# Patient Record
Sex: MALE | Race: ASIAN | NOT HISPANIC OR LATINO | Employment: UNEMPLOYED | URBAN - METROPOLITAN AREA
[De-identification: names, ages, dates, MRNs, and addresses within clinical notes are randomized per-mention and may not be internally consistent; named-entity substitution may affect disease eponyms.]

---

## 2017-01-31 DIAGNOSIS — L50.0 ALLERGIC URTICARIA: Primary | ICD-10-CM

## 2017-01-31 NOTE — TELEPHONE ENCOUNTER
wal-fex allergy      Last Written Prescription Date: 5/13/16  Last Fill Quantity: 90,  # refills: 2   Last Office Visit with G, P or [facility] prescribing provider: 9/1/16

## 2017-02-01 RX ORDER — FEXOFENADINE HCL 180 MG/1
180 TABLET ORAL DAILY
Qty: 90 TABLET | Refills: 2 | Status: SHIPPED | OUTPATIENT
Start: 2017-02-01 | End: 2017-08-15 | Stop reason: ALTCHOICE

## 2017-02-02 NOTE — TELEPHONE ENCOUNTER
Prescription approved per [facility].      Signed Prescriptions:                        Disp   Refills    fexofenadine (ALLEGRA) 180 MG tablet       90 tab*2        Sig: Take 1 tablet (180 mg) by mouth daily  Authorizing Provider: SOILA GLASGOW  Ordering User: ZITA BOLAÑOS      Closing encounter - no further actions needed at this time    Zita Bolaños RN

## 2017-04-19 ENCOUNTER — OFFICE VISIT (OUTPATIENT)
Dept: SLEEP MEDICINE | Facility: CLINIC | Age: 52
End: 2017-04-19
Attending: INTERNAL MEDICINE
Payer: COMMERCIAL

## 2017-04-19 VITALS
DIASTOLIC BLOOD PRESSURE: 83 MMHG | WEIGHT: 217.1 LBS | OXYGEN SATURATION: 96 % | HEIGHT: 72 IN | RESPIRATION RATE: 18 BRPM | SYSTOLIC BLOOD PRESSURE: 135 MMHG | BODY MASS INDEX: 29.4 KG/M2

## 2017-04-19 DIAGNOSIS — G47.33 OSA (OBSTRUCTIVE SLEEP APNEA): Primary | ICD-10-CM

## 2017-04-19 PROCEDURE — 99211 OFF/OP EST MAY X REQ PHY/QHP: CPT | Mod: ZF

## 2017-04-19 NOTE — NURSING NOTE
"Chief Complaint   Patient presents with     RECHECK     follow up dental device       Initial /83 (BP Location: Right arm, Patient Position: Chair, Cuff Size: Adult Large)  Resp 18  Ht 1.816 m (5' 11.5\")  Wt 98.5 kg (217 lb 1.6 oz)  SpO2 96%  BMI 29.86 kg/m2 Estimated body mass index is 29.86 kg/(m^2) as calculated from the following:    Height as of this encounter: 1.816 m (5' 11.5\").    Weight as of this encounter: 98.5 kg (217 lb 1.6 oz).  Medication Reconciliation: complete       Amado GRANGER      "

## 2017-04-19 NOTE — PROGRESS NOTES
SLEEP MEDICINE FOLLOWUP VISIT NOTE       DATE OF SLEEP CLINIC VISIT:  04/19/2017       CHIEF COMPLAINT AND PURPOSE OF VISIT:  Followup of ENEDINA.      HISTORY OF PRESENT ILLNESS:  Mr. Margaret Izaguirre is a 52-year-old male who presents to the Sleep Clinic today for  followup of ENEDINA.  The AHI from his initial sleep study was 11 per hour.  His sleep apnea is being treated with oral appliance and he has been  following up with Dr. Marlene Chavez (dentistry).  He reports using the oral appliance regularly during sleep.  An overnight polysomnography evaluation was obtained on 03/14/2016.  At the time of that PSG he weighed 210 pounds. During the polysomnography evaluation, the oral appliance was adjusted twice.  The residual AHI after the adjustments were completed was 8.2 per hour.  However, it was noted that obstructive events were noted while the patient was supine with a supine AHI of 10.6 per hour.  However, there was no significant sleep apneas while the patient was in nonsupine sleep position with an AHI of 4.3 per hour. Since the polysomnography evaluation in 03/2016, he has had the oral appliance at further adjusted once by Dr. Marlene Chavez.     According to his wife, there have not been any concerns about snoring since he has been using the oral appliance.  Mr. Izaguirre denies waking up gasping for air or with choking sensation.  He does report feeling more refreshed since he started the treatment with the oral appliance and notices improved energy levels during the day and denies excessive daytime sleepiness.  He denies any concerns about drowsiness while driving.        The patient mentioned  that  Dr. Chavez had referred him to Dr. Salas at the [facility] for evaluation of  nasal septal deviation and the ENT provider has discussed with him about septoplasty.  He had questions about the procedure and mentioned  that he would try all the noninvasive options first before pursuing  surgical intervention.   He denies  "problems with chronic nasal congestion.      There have been 7 pounds of weight gain since the PSG evaluation in 03/2016. He exercises regularly.       Current meds, Past medical history, Past surgical history, Allergies, Social history, Family history: reviewed, per EMR    EXAM:  /83 (BP Location: Right arm, Patient Position: Chair, Cuff Size: Adult Large)  Resp 18  Ht 1.816 m (5' 11.5\")  Wt 98.5 kg (217 lb 1.6 oz)  SpO2 96%  BMI 29.86 kg/m2  General appearance:  in no apparent distress  Pt is dressed casually, cooperative with good eye contact.   Speech is spontaneous with regular rate and volume.   Mood: euthymic; affect congruent with full range and intensity.   Sensorium: awake, alert and oriented to person, place, time, and situation.      IMPRESSION/ REPORT/ PLAN:  Previously diagnosed mild obstructive sleep apnea (AHI 11) per hour.  The patient is compliant with oral appliance for the treatment of sleep apnea and based on the polysomnography evaluation that was obtained in 03/2016 with the oral appliance there was no evidence of clinically significant sleep apnea during nonsupine sleep position.  I instructed him to follow up with his dentist, Dr. Marlene Chavez, to see if there is any feasibility for further advancement of the oral appliance.  In addition to the oral appliance, I have also recommended positional restriction during sleep and we discussed about tennis ball shirt, which is  cost effective, and the FDA approved Zzoma pillow.  The patient was interested in the Zzoma pillow, a prescription was provided for Zzoma pillow.  He was informed that the zzoma pillow may not be covered by  Insurance.  He was fine with that.  I also recommended obtaining a home sleep study after he obtains the Zzoma pillow and the HST will be done while he is using the oral appliance and the positional restriction device for checking the effectiveness of the combination treatment in sleep apnea.  The results of the " HST will be discussed with him via virtual visit in 1-2 weeks after the test is completed.  We discussed weight management with diet and exercise.   The patient has been reporting positive benefits from the current treatment with oral appliance and knowing that the sleep study obtained 1 year ago showed an AHI of only 4.3 per hour in nonsupine position, surgical intervention would probably not be necessary if the patient is not interested and also is not symptomatic.      TOTAL TIME SPENT DURING THIS VISIT:  25 minutes, more than 50% spent in counseling and coordinating plan of care.         MARIO RODARTE MD             D: 2017 15:28   T: 2017 16:13   MT: CYNTHIA      Name:     JESU CANDELARIO   MRN:      [id number]        Account:      [id number]   :      1965           Visit Date:   2017      Document: [id number]

## 2017-04-19 NOTE — PROGRESS NOTES
DICTATED THE SLEEP CLINIC VISIT NOTE FOR TODAY.  Darrell Shelby MD   of Medicine,  Division of Pulmonary/Sleep Medicine  [facility].

## 2017-04-19 NOTE — PATIENT INSTRUCTIONS

## 2017-04-30 DIAGNOSIS — G93.32 CHRONIC FATIGUE SYNDROME: ICD-10-CM

## 2017-04-30 DIAGNOSIS — G89.4 CHRONIC PAIN SYNDROME: ICD-10-CM

## 2017-04-30 DIAGNOSIS — M79.7 FIBROMYALGIA: ICD-10-CM

## 2017-04-30 DIAGNOSIS — F33.42 RECURRENT MAJOR DEPRESSIVE DISORDER, IN FULL REMISSION (H): ICD-10-CM

## 2017-05-01 ENCOUNTER — MYC MEDICAL ADVICE (OUTPATIENT)
Dept: FAMILY MEDICINE | Facility: CLINIC | Age: 52
End: 2017-05-01

## 2017-05-01 RX ORDER — DULOXETIN HYDROCHLORIDE 60 MG/1
CAPSULE, DELAYED RELEASE ORAL
Qty: 90 CAPSULE | Refills: 0 | Status: SHIPPED | OUTPATIENT
Start: 2017-05-01 | End: 2017-05-03

## 2017-05-01 ASSESSMENT — PATIENT HEALTH QUESTIONNAIRE - PHQ9
SUM OF ALL RESPONSES TO PHQ QUESTIONS 1-9: 4
10. IF YOU CHECKED OFF ANY PROBLEMS, HOW DIFFICULT HAVE THESE PROBLEMS MADE IT FOR YOU TO DO YOUR WORK, TAKE CARE OF THINGS AT HOME, OR GET ALONG WITH OTHER PEOPLE: SOMEWHAT DIFFICULT

## 2017-05-01 NOTE — TELEPHONE ENCOUNTER
Routing refill request to provider for review/approval because:  -PHQ-9 > 4    Dr. Orlando-Please sign if agree.  Writer sent patient a [employer] message with PHQ-9 questionnaire attached.    Thank you!  CECIL KhanN, RN

## 2017-05-01 NOTE — TELEPHONE ENCOUNTER
Medication Detail      Disp Refills Start End LINDA   DULoxetine (CYMBALTA) 60 MG capsule 90 capsule 1 9/1/2016  No   Sig: Take 1 capsule (60 mg) by mouth daily       Last Office Visit with G, P or [facility] prescribing provider: 9/1/16   Next 5 appointments (look out 90 days)     May 03, 2017 12:00 PM CDT   Pre-Op physical with Danielle Orlando MD   [facility]    [address] [place] [id number] [phone]                   BP Readings from Last 3 Encounters:   04/19/17 135/83   09/01/16 136/88   03/16/16 116/70     Pulse: (for Fetzima)  Creatinine   Date Value Ref Range Status   09/01/2016 0.83 0.66 - 1.25 mg/dL Final   ]    Last PHQ-9 score on record=   PHQ-9 SCORE 9/1/2016   Total Score MyChart -   Total Score 7

## 2017-05-02 ASSESSMENT — PATIENT HEALTH QUESTIONNAIRE - PHQ9: SUM OF ALL RESPONSES TO PHQ QUESTIONS 1-9: 4

## 2017-05-02 NOTE — TELEPHONE ENCOUNTER
PHQ-9 SCORE 1/7/2016 9/1/2016 5/1/2017   Total Score - - -   Total Score MyChart - - 4 (Minimal depression)   Total Score 6 7 -     Dr. Orlando-Please review.    Thank you!  CARLITA Santos, BSN, RN

## 2017-05-03 ENCOUNTER — OFFICE VISIT (OUTPATIENT)
Dept: FAMILY MEDICINE | Facility: CLINIC | Age: 52
End: 2017-05-03
Payer: COMMERCIAL

## 2017-05-03 VITALS
OXYGEN SATURATION: 100 % | HEIGHT: 70 IN | TEMPERATURE: 98.4 F | HEART RATE: 102 BPM | SYSTOLIC BLOOD PRESSURE: 125 MMHG | BODY MASS INDEX: 31.25 KG/M2 | WEIGHT: 218.25 LBS | DIASTOLIC BLOOD PRESSURE: 83 MMHG

## 2017-05-03 DIAGNOSIS — G47.33 OBSTRUCTIVE SLEEP APNEA: ICD-10-CM

## 2017-05-03 DIAGNOSIS — G89.4 CHRONIC PAIN SYNDROME: ICD-10-CM

## 2017-05-03 DIAGNOSIS — Z01.818 PREOP GENERAL PHYSICAL EXAM: Primary | ICD-10-CM

## 2017-05-03 DIAGNOSIS — M79.7 FIBROMYALGIA: ICD-10-CM

## 2017-05-03 DIAGNOSIS — F33.42 RECURRENT MAJOR DEPRESSIVE DISORDER, IN FULL REMISSION (H): ICD-10-CM

## 2017-05-03 DIAGNOSIS — G93.32 CHRONIC FATIGUE SYNDROME: ICD-10-CM

## 2017-05-03 LAB — HGB BLD-MCNC: 14.5 G/DL (ref 13.3–17.7)

## 2017-05-03 PROCEDURE — 85018 HEMOGLOBIN: CPT | Performed by: FAMILY MEDICINE

## 2017-05-03 PROCEDURE — 36415 COLL VENOUS BLD VENIPUNCTURE: CPT | Performed by: FAMILY MEDICINE

## 2017-05-03 PROCEDURE — 99215 OFFICE O/P EST HI 40 MIN: CPT | Performed by: FAMILY MEDICINE

## 2017-05-03 PROCEDURE — 80053 COMPREHEN METABOLIC PANEL: CPT | Performed by: FAMILY MEDICINE

## 2017-05-03 RX ORDER — DULOXETIN HYDROCHLORIDE 60 MG/1
CAPSULE, DELAYED RELEASE ORAL
Qty: 90 CAPSULE | Refills: 1 | Status: SHIPPED | OUTPATIENT
Start: 2017-05-03 | End: 2018-01-11

## 2017-05-03 RX ORDER — PREGABALIN 150 MG/1
150 CAPSULE ORAL 2 TIMES DAILY
Qty: 180 CAPSULE | Refills: 3 | Status: SHIPPED | OUTPATIENT
Start: 2017-05-03 | End: 2018-04-25

## 2017-05-03 ASSESSMENT — ANXIETY QUESTIONNAIRES
2. NOT BEING ABLE TO STOP OR CONTROL WORRYING: NOT AT ALL
GAD7 TOTAL SCORE: 0
6. BECOMING EASILY ANNOYED OR IRRITABLE: NOT AT ALL
3. WORRYING TOO MUCH ABOUT DIFFERENT THINGS: NOT AT ALL
5. BEING SO RESTLESS THAT IT IS HARD TO SIT STILL: NOT AT ALL
1. FEELING NERVOUS, ANXIOUS, OR ON EDGE: NOT AT ALL
7. FEELING AFRAID AS IF SOMETHING AWFUL MIGHT HAPPEN: NOT AT ALL

## 2017-05-03 ASSESSMENT — PATIENT HEALTH QUESTIONNAIRE - PHQ9: 5. POOR APPETITE OR OVEREATING: NOT AT ALL

## 2017-05-03 NOTE — PROGRESS NOTES
[facility]  [address] [place] [id number] [phone]  Dept: [phone]    PRE-OP EVALUATION:  Today's date: 5/3/2017    Margaret Izaguirre (: 1965) presents for pre-operative evaluation assessment as requested by Dr. Swetha Salas.  He requires evaluation and anesthesia risk assessment prior to undergoing surgery/procedure for treatment of deviated septum.  Proposed procedure: septoplasty    Date of Surgery/ Procedure: 17  Time of Surgery/ Procedure: 7am  Hospital/Surgical Facility: [facility]   Fax number for surgical facility: [phone] ([facility])  Primary Physician: Danielle Orlando  Type of Anesthesia Anticipated: General    Patient has a Health Care Directive or Living Will:  NO    Preop Questions 2017   1.  Do you have a history of heart attack, stroke, stent, bypass or surgery on an artery in the head, neck, heart or legs? No   2.  Do you ever have any pain or discomfort in your chest? No   3.  Do you have a history of  Heart Failure? No   4.   Are you troubled by shortness of breath when:  walking on a level surface, or up a slight hill, or at night? No   5.  Do you currently have a cold, bronchitis or other respiratory infection? No   6.  Do you have a cough, shortness of breath, or wheezing? No   7.  Do you sometimes get pains in the calves of your legs when you walk? No   8. Do you or anyone in your family have previous history of blood clots? No   9.  Do you or does anyone in your family have a serious bleeding problem such as prolonged bleeding following surgeries or cuts? No   10. Have you ever had problems with anemia or been told to take iron pills? No   11. Have you had any abnormal blood loss such as black, tarry or bloody stools? No   12. Have you ever had a blood transfusion? No   13. Have you or any of your relatives ever had problems with anesthesia? No   14. Do you have sleep apnea, excessive snoring or daytime  drowsiness? YES - ENEDINA   15. Do you have any prosthetic heart valves? No   16. Do you have prosthetic joints? No         HPI:                                                      Brief HPI related to upcoming procedure: Has deviated septum - longstanding.  Has ENEDINA and uses oral appliance.  Sleep MD and oral appliance specialist both noted deviated septum may be contributing to ENEDINA.  He has decided to go ahead with the septoplasty      See problem list for active medical problems.  Problems all longstanding and stable, except as noted/documented.  See ROS for pertinent symptoms related to these conditions.                                               Fibromyalgia/CFS Follow-Up.  Still participating in [facility] in [place].  Maintaining good sleep/exercise habits.  Rarely uses trazodone any more.  Does use Lyrica and tizanidine regularly.      Depression Follow-Up.  Doing well on cymbalta.  Feels mood has been good.  PHQ-9 SCORE 9/1/2016 5/1/2017 5/3/2017   Total Score - - -   Total Score MyChart - 4 (Minimal depression) -   Total Score 7 - 5     GUERRERO-7 SCORE 9/1/2016 5/3/2017   Total Score 0 0        MEDICAL HISTORY:                                                      Patient Active Problem List    Diagnosis Date Noted     Adenomatous polyp of colon      Priority: Medium     4/2015 - rpt in 3 yrs.       Obstructive sleep apnea 03/02/2013     Priority: Medium     Chronic nasal congestion 03/02/2013     Priority: Medium     Delayed sleep phase syndrome 03/02/2013     Priority: Medium     Major depression      Priority: Medium     suicide attempt age 11 in [place]       Memory loss      Priority: Medium     mild cognitive dysfunction, executive function, Dr. Gerber, [facility]       Chronic pain syndrome 07/22/2010     Priority: Medium     Allergic urticaria 04/24/2010     Priority: Medium     Chronic fatigue syndrome      Priority: Medium     diagnosed at [place]       Fibromyalgia      Priority:  Medium      Past Medical History:   Diagnosis Date     Adenomatous polyp of colon     4/2015 - rpt in 3 yrs.     Anticholinergic crisis 12/10    OD of [facility]     Chronic fatigue syndrome 1/07    diagnosed at [place]     Fibromyalgia 2008     Generalized anxiety disorder      Insomnia      Major depression     suicide attempt age 11 in [place]     Memory loss     mild cognitive dysfunction, executive function, Dr. Gerber, [facility]     Obsessive-compulsive personality disorder      Rhabdomyolysis 12/10    [facility]     Past Surgical History:   Procedure Laterality Date     COLONOSCOPY  5/6/2005    WNL     Current Outpatient Prescriptions   Medication Sig Dispense Refill     DULoxetine (CYMBALTA) 60 MG EC capsule TAKE 1 CAPSULE BY MOUTH EVERY DAY 90 capsule 0     fexofenadine (ALLEGRA) 180 MG tablet Take 1 tablet (180 mg) by mouth daily 90 tablet 2     pregabalin (LYRICA) 150 MG capsule Take 1 capsule (150 mg) by mouth 2 times daily 60 capsule 5     tiZANidine (ZANAFLEX) 4 MG tablet Take 2 tablets (8 mg) by mouth At Bedtime       Cholecalciferol (VITAMIN D3 PO) Take 5,000 Units by mouth daily       calcium-vitamin D (CALCIUM 600 + D) 600-400 MG-UNIT per tablet Take 1 tablet by mouth daily       IBUPROFEN 200 MG PO CAPS 2 capsules 3 - 4 times/day PRN       MULTIVITAMINS OR TABS ONE DAILY 100 1 YEAR     FISH OIL 1000 MG OR CAPS 1 tab daily  0     TYLENOL CAPS 500 MG OR 1 CAPSULE EVERY 4 HOURS AS NEEDED       OTC products: None, except as noted above    Allergies   Allergen Reactions     Ambien      Hospitalized after prolonged, severe sleep-walking episode      Latex Allergy: NO    Social History   Substance Use Topics     Smoking status: Never Smoker     Smokeless tobacco: Never Used     Alcohol use Yes      Comment: Very occasional use (once every few months)     History   Drug Use No       REVIEW OF SYSTEMS:                                                      CONST: NEGATIVE for  "fevers/chills/sweats, unexplained weight loss/gain, and POSITIVE for fatigue (chronic - stable)  EYES: NEGATIVE for change in vision  ENT: NEGATIVE for difficulty hearing/tinnitus, and problems with teeth/gums  BREAST: NEGATIVE for breast lump/discharge  CV: NEGATIVE for chest pain/discomfort, leg pain with exercise, and palpitations  RESP: NEGATIVE for cough/wheeze, and difficulty breathing  GI: NEGATIVE for abdominal pain, blood in bowel movement, and nausea/vomiting/diarrhea  : NEGATIVE for nighttime urination, leaking urine, unusual vaginal bleeding, penile/vaginal discharge, and concerns about sexual function  MS: NEGATIVE for muscle/joint pain  SKIN: NEGATIVE for rash or mole change  NEURO: NEGATIVE for headache, dizziness/lightheadedness, numbness, memory loss, and loss of coordination  PSYCH: NEGATIVE for anxiety/stress and depression and POSITIVE for problems with sleep  HEME: NEGATIVE for unexplained lumps, and easy bruising/bleeding  ENDO: NEGATIVE for excessive thirst or urination  ALL: NEGATIVE for hay fever/allergies     EXAM:                                                    /83 (BP Location: Right arm, Patient Position: Chair, Cuff Size: Adult Large)  Pulse 102  Temp 98.4  F (36.9  C) (Oral)  Ht 5' 9.75\" (1.772 m)  Wt 218 lb 4 oz (99 kg)  SpO2 100%  BMI 31.54 kg/m2  GEN:  no apparent distress  EYES: PERRL, conjunctivae and sclerae clear   ENT: external ears and nose without lesions or scars, TM's and canals clear bilaterally, oropharynx clear with moist mucus membranes and normal landmarks and lips, teeth, and gums with no abnormalities noted   NECK:  Supple without adenopathy, mass, or thyromegaly   LUNGS:  normal respiratory effort, and lungs clear to auscultation bilaterally - no rales, rhonchi or wheezes  CV: regular rate and rhythm, normal S1 S2, no S3 or S4, no murmur, click or rub and bilateral lower extremities without edema   ABD:  soft, nontender, no mass, no " hepatosplenomegaly, no hernias  SKIN:  normal to inspection and palpation, no rashes or abnormal-appearing lesions   PSYCH: mood/affect appear normal/bright     DIAGNOSTICS:                                                    EKG: Not indicated due to non-vascular surgery and low risk of event (age <65 and without cardiac risk factors)    Recent Labs   Lab Test  09/01/16   1756  06/08/15   1329  03/18/15   1210  07/17/14   1600   HGB   --    --   15.0  14.3   PLT   --    --   322  327   NA  138  139  137   --    POTASSIUM  4.4  4.4  4.1   --    CR  0.83  0.76  0.74   --       Results for orders placed or performed in visit on 05/03/17   Hemoglobin   Result Value Ref Range    Hemoglobin 14.5 13.3 - 17.7 g/dL   Comprehensive metabolic panel   Result Value Ref Range    Sodium 140 133 - 144 mmol/L    Potassium 4.1 3.4 - 5.3 mmol/L    Chloride 105 94 - 109 mmol/L    Carbon Dioxide 25 20 - 32 mmol/L    Anion Gap 10 3 - 14 mmol/L    Glucose 112 (H) 70 - 99 mg/dL    Urea Nitrogen 13 7 - 30 mg/dL    Creatinine 0.81 0.66 - 1.25 mg/dL    GFR Estimate >90  Non  GFR Calc   >60 mL/min/1.7m2    GFR Estimate If Black >90   GFR Calc   >60 mL/min/1.7m2    Calcium 9.1 8.5 - 10.1 mg/dL    Bilirubin Total 0.4 0.2 - 1.3 mg/dL    Albumin 4.2 3.4 - 5.0 g/dL    Protein Total 7.9 6.8 - 8.8 g/dL    Alkaline Phosphatase 59 40 - 150 U/L    ALT 43 0 - 70 U/L    AST 23 0 - 45 U/L      IMPRESSION:                                                    Reason for surgery/procedure: deviated septum  Diagnosis/reason for consult: preoperative assessment    The proposed surgical procedure is considered INTERMEDIATE risk.    REVISED CARDIAC RISK INDEX  The patient has the following serious cardiovascular risks for perioperative complications such as (MI, PE, VFib and 3  AV Block):  No serious cardiac risks  INTERPRETATION: 0 risks: Class I (very low risk - 0.4% complication rate)    The patient has the following additional  risks for perioperative complications:  No identified additional risks      ICD-10-CM    1. Preop general physical exam Z01.818 Hemoglobin     Comprehensive metabolic panel   2. Fibromyalgia M79.7 pregabalin (LYRICA) 150 MG capsule     DULoxetine (CYMBALTA) 60 MG EC capsule   3. Obstructive sleep apnea G47.33    4. Chronic pain syndrome G89.4 DULoxetine (CYMBALTA) 60 MG EC capsule   5. Chronic fatigue syndrome R53.82 DULoxetine (CYMBALTA) 60 MG EC capsule   6. Recurrent major depressive disorder, in full remission (H) F33.42 DULoxetine (CYMBALTA) 60 MG EC capsule       RECOMMENDATIONS:                                                        --Patient is to take all scheduled medications on the day of surgery EXCEPT for modifications listed below.    Anticoagulant or Antiplatelet Medication Use  NSAIDS: Ibuprofen (Motrin):  Stop one day prior to surgery  Fish oil:  Hold for one week prior to surgery    APPROVAL GIVEN to proceed with proposed procedure, without further diagnostic evaluation     Patient Instructions   Stop the fish oil for a week before surgery.    Stop the ibuprofen 3 days prior to surgery.  If you need an over-the-counter pain med use acetaminophen.    The morning of surgery take your 3 prescription medications with small sip of water.  Hold all vitamins and supplements.        Before Your Surgery      Call your surgeon if there is any change in your health. This includes signs of a cold or flu (such as a sore throat, runny nose, cough, rash or fever).    Do not smoke, drink alcohol or take over the counter medicine (unless your surgeon or primary care doctor tells you to) for the 24 hours before and after surgery.    If you take prescribed drugs: Follow your doctor s orders about which medicines to take and which to stop until after surgery.    Eating and drinking prior to surgery: follow the instructions from your surgeon    Take a shower or bath the night before surgery. Use the soap your surgeon  gave you to gently clean your skin. If you do not have soap from your surgeon, use your regular soap. Do not shave or scrub the surgery site.  Wear clean pajamas and have clean sheets on your bed.      Signed Electronically by: Danielle Orlando MD    Copy of this evaluation report is provided to requesting physician.    [facility] Preop Guidelines

## 2017-05-03 NOTE — MR AVS SNAPSHOT
After Visit Summary   5/3/2017    Margaret Izaguirre    MRN: [id number]           Patient Information     Date Of Birth          1965        Visit Information        Provider Department      5/3/2017 12:00 PM Danielle Orlando MD Formerly named [facility]        Today's Diagnoses     Preop general physical exam    -  1    Fibromyalgia        Obstructive sleep apnea        Chronic pain syndrome        Chronic fatigue syndrome        Recurrent major depressive disorder, in full remission (H)          Care Instructions    Stop the fish oil for a week before surgery.    Stop the ibuprofen 3 days prior to surgery.  If you need an over-the-counter pain med use acetaminophen.    The morning of surgery take your 3 prescription medications with small sip of water.  Hold all vitamins and supplements.        Before Your Surgery      Call your surgeon if there is any change in your health. This includes signs of a cold or flu (such as a sore throat, runny nose, cough, rash or fever).    Do not smoke, drink alcohol or take over the counter medicine (unless your surgeon or primary care doctor tells you to) for the 24 hours before and after surgery.    If you take prescribed drugs: Follow your doctor s orders about which medicines to take and which to stop until after surgery.    Eating and drinking prior to surgery: follow the instructions from your surgeon    Take a shower or bath the night before surgery. Use the soap your surgeon gave you to gently clean your skin. If you do not have soap from your surgeon, use your regular soap. Do not shave or scrub the surgery site.  Wear clean pajamas and have clean sheets on your bed.         Follow-ups after your visit        Who to contact     If you have questions or need follow up information about today's clinic visit or your schedule please contact [facility] directly at [phone].  Normal or non-critical lab and imaging results will be communicated  "to you by [employer], letter or phone within 4 business days after the clinic has received the results. If you do not hear from us within 7 days, please contact the clinic through [employer] or phone. If you have a critical or abnormal lab result, we will notify you by phone as soon as possible.  Submit refill requests through [employer] or call your pharmacy and they will forward the refill request to us. Please allow 3 business days for your refill to be completed.          Additional Information About Your Visit        [employer] Information     [employer] gives you secure access to your electronic health record. If you see a primary care provider, you can also send messages to your care team and make appointments. If you have questions, please call your primary care clinic.  If you do not have a primary care provider, please call [phone] and they will assist you.        Care EveryWhere ID     This is your Care EveryWhere ID. This could be used by other organizations to access your [facility] medical records  [id number]        Your Vitals Were     Pulse Temperature Height Pulse Oximetry BMI (Body Mass Index)       102 98.4  F (36.9  C) (Oral) 5' 9.75\" (1.772 m) 100% 31.54 kg/m2        Blood Pressure from Last 3 Encounters:   05/03/17 125/83   04/19/17 135/83   09/01/16 136/88    Weight from Last 3 Encounters:   05/03/17 218 lb 4 oz (99 kg)   04/19/17 217 lb 1.6 oz (98.5 kg)   09/01/16 216 lb (98 kg)              We Performed the Following     Comprehensive metabolic panel     Hemoglobin          Today's Medication Changes          These changes are accurate as of: 5/3/17 12:30 PM.  If you have any questions, ask your nurse or doctor.               These medicines have changed or have updated prescriptions.        Dose/Directions    DULoxetine 60 MG EC capsule   Commonly known as:  CYMBALTA   This may have changed:  See the new instructions.   Used for:  Fibromyalgia, Chronic pain syndrome, Chronic fatigue syndrome, " Recurrent major depressive disorder, in full remission (H)   Changed by:  Danielle Orlando MD        TAKE 1 CAPSULE BY MOUTH EVERY DAY   Quantity:  90 capsule   Refills:  1         Stop taking these medicines if you haven't already. Please contact your care team if you have questions.     B-12 1000 MCG Tbcr   Stopped by:  Danielle Orlando MD           zinc sulfate 220 (50 ZN) MG capsule   Commonly known as:  ZINCATE   Stopped by:  Danielle Orlando MD                Where to get your medicines      These medications were sent to [id number] - [facility] [address] [facility]  [address] [facility] [place] [address]     Phone:  [phone]     DULoxetine 60 MG EC capsule         Some of these will need a paper prescription and others can be bought over the counter.  Ask your nurse if you have questions.     Bring a paper prescription for each of these medications     pregabalin 150 MG capsule                Primary Care Provider Office Phone # Fax #    Danielle Orlando [phone]       [facility] [address]  [facility] [address]        Thank you!     Thank you for choosing [facility]  for your care. Our goal is always to provide you with excellent care. Hearing back from our patients is one way we can continue to improve our services. Please take a few minutes to complete the written survey that you may receive in the mail after your visit with us. Thank you!             Your Updated Medication List - Protect others around you: Learn how to safely use, store and throw away your medicines at www.disposemymeds.org.          This list is accurate as of: 5/3/17 12:30 PM.  Always use your most recent med list.                   Brand Name Dispense Instructions for use    calcium 600 + D 600-400 MG-UNIT per tablet   Generic drug:  calcium-vitamin D      Take 1 tablet by mouth daily       DULoxetine  60 MG EC capsule    CYMBALTA    90 capsule    TAKE 1 CAPSULE BY MOUTH EVERY DAY       fexofenadine 180 MG tablet    ALLEGRA    90 tablet    Take 1 tablet (180 mg) by mouth daily       fish oil-omega-3 fatty acids 1000 MG capsule      1 tab daily       ibuprofen 200 MG capsule      2 capsules 3 - 4 times/day PRN       multivitamin per tablet     100    ONE DAILY       pregabalin 150 MG capsule    LYRICA    180 capsule    Take 1 capsule (150 mg) by mouth 2 times daily       tiZANidine 4 MG tablet    ZANAFLEX     Take 2 tablets (8 mg) by mouth At Bedtime       TYLENOL CAPS 500 MG OR      1 CAPSULE EVERY 4 HOURS AS NEEDED       VITAMIN D3 PO      Take 5,000 Units by mouth daily

## 2017-05-03 NOTE — LETTER
My Depression Action Plan  Name: Margaret Izaguirre   Date of Birth 1965  Date: 5/3/2017    My doctor: Danielle Orlando   My clinic: [address] [address] [phone]          GREEN    ZONE   Good Control    What it looks like:     Things are going generally well. You have normal up s and down s. You may even feel depressed from time to time, but bad moods usually last less than a day.   What you need to do:  1. Continue to care for yourself (see self care plan)  2. Check your depression survival kit and update it as needed  3. Follow your physician s recommendations including any medication.  4. Do not stop taking medication unless you consult with your physician first.           YELLOW         ZONE Getting Worse    What it looks like:     Depression is starting to interfere with your life.     It may be hard to get out of bed; you may be starting to isolate yourself from others.    Symptoms of depression are starting to last most all day and this has happened for several days.     You may have suicidal thoughts but they are not constant.   What you need to do:     1. Call your care team, your response to treatment will improve if you keep your care team informed of your progress. Yellow periods are signs an adjustment may need to be made.     2. Continue your self-care, even if you have to fake it!    3. Talk to someone in your support network    4. Open up your depression survival kit           RED    ZONE Medical Alert - Get Help    What it looks like:     Depression is seriously interfering with your life.     You may experience these or other symptoms: You can t get out of bed most days, can t work or engage in other necessary activities, you have trouble taking care of basic hygiene, or basic responsibilities, thoughts of suicide or death that will not go away, self-injurious behavior.     What you need to do:  1. Call your care team and  request a same-day appointment. If they are not available (weekends or after hours) call your local crisis line, emergency room or 911.      Electronically signed by: Danielle Orlando, May 3, 2017    Depression Self Care Plan / Survival Kit    Self-Care for Depression  Here s the deal. Your body and mind are really not as separate as most people think.  What you do and think affects how you feel and how you feel influences what you do and think. This means if you do things that people who feel good do, it will help you feel better.  Sometimes this is all it takes.  There is also a place for medication and therapy depending on how severe your depression is, so be sure to consult with your medical provider and/ or Behavioral Health Consultant if your symptoms are worsening or not improving.     In order to better manage my stress, I will:    Exercise  Get some form of exercise, every day. This will help reduce pain and release endorphins, the  feel good  chemicals in your brain. This is almost as good as taking antidepressants!  This is not the same as joining a gym and then never going! (they count on that by the way ) It can be as simple as just going for a walk or doing some gardening, anything that will get you moving.      Hygiene   Maintain good hygiene (Get out of bed in the morning, Make your bed, Brush your teeth, Take a shower, and Get dressed like you were going to work, even if you are unemployed).  If your clothes don't fit try to get ones that do.    Diet  I will strive to eat foods that are good for me, drink plenty of water, and avoid excessive sugar, caffeine, alcohol, and other mood-altering substances.  Some foods that are helpful in depression are: complex carbohydrates, B vitamins, flaxseed, fish or fish oil, fresh fruits and vegetables.    Psychotherapy  I agree to participate in Individual Therapy (if recommended).    Medication  If prescribed medications, I agree to take them.  Missing doses  can result in serious side effects.  I understand that drinking alcohol, or other illicit drug use, may cause potential side effects.  I will not stop my medication abruptly without first discussing it with my provider.    Staying Connected With Others  I will stay in touch with my friends, family members, and my primary care provider/team.    Use your imagination  Be creative.  We all have a creative side; it doesn t matter if it s oil painting, sand castles, or mud pies! This will also kick up the endorphins.    Witness Beauty  (AKA stop and smell the roses) Take a look outside, even in mid-winter. Notice colors, textures. Watch the squirrels and birds.     Service to others  Be of service to others.  There is always someone else in need.  By helping others we can  get out of ourselves  and remember the really important things.  This also provides opportunities for practicing all the other parts of the program.    Humor  Laugh and be silly!  Adjust your TV habits for less news and crime-drama and more comedy.    Control your stress  Try breathing deep, massage therapy, biofeedback, and meditation. Find time to relax each day.     My support system    Clinic Contact:  Phone number:    Contact 1:  Phone number:    Contact 2:  Phone number:    [place]/:  Phone number:    Therapist:  Phone number:    Local crisis center:    Phone number:    Other community support:  Phone number:

## 2017-05-03 NOTE — NURSING NOTE
"Chief Complaint   Patient presents with     Pre-Op Exam       Initial /83 (BP Location: Right arm, Patient Position: Chair, Cuff Size: Adult Large)  Pulse 102  Temp 98.4  F (36.9  C) (Oral)  Ht 5' 9.75\" (1.772 m)  Wt 218 lb 4 oz (99 kg)  SpO2 100%  BMI 31.54 kg/m2 Estimated body mass index is 31.54 kg/(m^2) as calculated from the following:    Height as of this encounter: 5' 9.75\" (1.772 m).    Weight as of this encounter: 218 lb 4 oz (99 kg).  Medication Reconciliation: complete     Cinthya Mireles MA        "

## 2017-05-03 NOTE — PATIENT INSTRUCTIONS
Stop the fish oil for a week before surgery.    Stop the ibuprofen 3 days prior to surgery.  If you need an over-the-counter pain med use acetaminophen.    The morning of surgery take your 3 prescription medications with small sip of water.  Hold all vitamins and supplements.        Before Your Surgery      Call your surgeon if there is any change in your health. This includes signs of a cold or flu (such as a sore throat, runny nose, cough, rash or fever).    Do not smoke, drink alcohol or take over the counter medicine (unless your surgeon or primary care doctor tells you to) for the 24 hours before and after surgery.    If you take prescribed drugs: Follow your doctor s orders about which medicines to take and which to stop until after surgery.    Eating and drinking prior to surgery: follow the instructions from your surgeon    Take a shower or bath the night before surgery. Use the soap your surgeon gave you to gently clean your skin. If you do not have soap from your surgeon, use your regular soap. Do not shave or scrub the surgery site.  Wear clean pajamas and have clean sheets on your bed.

## 2017-05-04 LAB
ALBUMIN SERPL-MCNC: 4.2 G/DL (ref 3.4–5)
ALP SERPL-CCNC: 59 U/L (ref 40–150)
ALT SERPL W P-5'-P-CCNC: 43 U/L (ref 0–70)
ANION GAP SERPL CALCULATED.3IONS-SCNC: 10 MMOL/L (ref 3–14)
AST SERPL W P-5'-P-CCNC: 23 U/L (ref 0–45)
BILIRUB SERPL-MCNC: 0.4 MG/DL (ref 0.2–1.3)
BUN SERPL-MCNC: 13 MG/DL (ref 7–30)
CALCIUM SERPL-MCNC: 9.1 MG/DL (ref 8.5–10.1)
CHLORIDE SERPL-SCNC: 105 MMOL/L (ref 94–109)
CO2 SERPL-SCNC: 25 MMOL/L (ref 20–32)
CREAT SERPL-MCNC: 0.81 MG/DL (ref 0.66–1.25)
GFR SERPL CREATININE-BSD FRML MDRD: ABNORMAL ML/MIN/1.7M2
GLUCOSE SERPL-MCNC: 112 MG/DL (ref 70–99)
POTASSIUM SERPL-SCNC: 4.1 MMOL/L (ref 3.4–5.3)
PROT SERPL-MCNC: 7.9 G/DL (ref 6.8–8.8)
SODIUM SERPL-SCNC: 140 MMOL/L (ref 133–144)

## 2017-05-04 ASSESSMENT — PATIENT HEALTH QUESTIONNAIRE - PHQ9: SUM OF ALL RESPONSES TO PHQ QUESTIONS 1-9: 5

## 2017-05-04 ASSESSMENT — ANXIETY QUESTIONNAIRES: GAD7 TOTAL SCORE: 0

## 2017-05-05 NOTE — PROGRESS NOTES
Tj Robles,  Thanks for coming to clinic.  Your test results are below.  Everything looks fine.    Danielle Orlando MD

## 2017-05-17 ENCOUNTER — MYC MEDICAL ADVICE (OUTPATIENT)
Dept: FAMILY MEDICINE | Facility: CLINIC | Age: 52
End: 2017-05-17

## 2017-05-17 DIAGNOSIS — G93.32 CHRONIC FATIGUE SYNDROME: Primary | ICD-10-CM

## 2017-06-05 ENCOUNTER — MYC MEDICAL ADVICE (OUTPATIENT)
Dept: FAMILY MEDICINE | Facility: CLINIC | Age: 52
End: 2017-06-05

## 2017-06-05 NOTE — TELEPHONE ENCOUNTER
Dr. Orlando -- FYI from patient regarding his septoplasty.    Katharina Woods, BSN, RN  [facility]

## 2017-06-21 ENCOUNTER — OFFICE VISIT (OUTPATIENT)
Dept: FAMILY MEDICINE | Facility: CLINIC | Age: 52
End: 2017-06-21
Payer: COMMERCIAL

## 2017-06-21 VITALS
HEART RATE: 91 BPM | BODY MASS INDEX: 30.99 KG/M2 | SYSTOLIC BLOOD PRESSURE: 109 MMHG | HEIGHT: 70 IN | WEIGHT: 216.5 LBS | RESPIRATION RATE: 20 BRPM | DIASTOLIC BLOOD PRESSURE: 74 MMHG | OXYGEN SATURATION: 97 % | TEMPERATURE: 98.5 F

## 2017-06-21 DIAGNOSIS — M79.7 FIBROMYALGIA: ICD-10-CM

## 2017-06-21 DIAGNOSIS — G89.4 CHRONIC PAIN SYNDROME: ICD-10-CM

## 2017-06-21 DIAGNOSIS — G47.33 OBSTRUCTIVE SLEEP APNEA: ICD-10-CM

## 2017-06-21 DIAGNOSIS — G93.32 CHRONIC FATIGUE SYNDROME: ICD-10-CM

## 2017-06-21 DIAGNOSIS — J34.2 DEVIATED NASAL SEPTUM: ICD-10-CM

## 2017-06-21 DIAGNOSIS — Z01.818 PREOP GENERAL PHYSICAL EXAM: Primary | ICD-10-CM

## 2017-06-21 DIAGNOSIS — R09.81 CHRONIC NASAL CONGESTION: ICD-10-CM

## 2017-06-21 LAB — HGB BLD-MCNC: 15.1 G/DL (ref 13.3–17.7)

## 2017-06-21 PROCEDURE — 99214 OFFICE O/P EST MOD 30 MIN: CPT | Performed by: FAMILY MEDICINE

## 2017-06-21 PROCEDURE — 85018 HEMOGLOBIN: CPT | Performed by: FAMILY MEDICINE

## 2017-06-21 PROCEDURE — 36415 COLL VENOUS BLD VENIPUNCTURE: CPT | Performed by: FAMILY MEDICINE

## 2017-06-21 NOTE — PROGRESS NOTES
[facility]  [address] [place] [id number] [phone]  Dept: [phone]    PRE-OP EVALUATION:  Today's date: 2017    Margaret Izaguirre (: 1965) presents for pre-operative evaluation assessment as requested by Dr. Nick Zavala.  He requires evaluation and anesthesia risk assessment prior to undergoing surgery/procedure for treatment of deviated septum.  Proposed procedure:Septoturbinoplasty.    Date of Surgery/ Procedure: 17  Time of Surgery/ Procedure: 7:30am  Hospital/Surgical Facility: [facility]  Fax number for surgical facility: [phone]  Primary Physician: Danielle Orlando  Type of Anesthesia Anticipated: General    Patient has a Health Care Directive or Living Will:  NO    Preop Questions 2017   1.  Do you have a history of heart attack, stroke, stent, bypass or surgery on an artery in the head, neck, heart or legs? No   2.  Do you ever have any pain or discomfort in your chest? No   3.  Do you have a history of  Heart Failure? No   4.   Are you troubled by shortness of breath when:  walking on a level surface, or up a slight hill, or at night? No   5.  Do you currently have a cold, bronchitis or other respiratory infection? No   6.  Do you have a cough, shortness of breath, or wheezing? No   7.  Do you sometimes get pains in the calves of your legs when you walk? No   8. Do you or anyone in your family have previous history of blood clots? No   9.  Do you or does anyone in your family have a serious bleeding problem such as prolonged bleeding following surgeries or cuts? No   10. Have you ever had problems with anemia or been told to take iron pills? No   11. Have you had any abnormal blood loss such as black, tarry or bloody stools? No   12. Have you ever had a blood transfusion? No   13. Have you or any of your relatives ever had problems with anesthesia? No   14. Do you have sleep apnea, excessive snoring or daytime drowsiness? YES  - uses oral appliance   15. Do you have any prosthetic heart valves? No   16. Do you have prosthetic joints? No         HPI:                                                      Brief HPI related to upcoming procedure: Has deviated septum - longstanding.  Has ENEDINA and uses oral appliance.  Sleep MD and oral appliance specialist both noted deviated septum may be contributing to ENEDINA.  He has decided to go ahead with the septoplasty      See problem list for active medical problems.  Problems all longstanding and stable, except as noted/documented.  See ROS for pertinent symptoms related to these conditions.                                               Fibromyalgia/CFS Follow-Up.  Still participating in [facility] in [place].  Maintaining good sleep/exercise habits.  Rarely uses trazodone any more.  Does use Lyrica and tizanidine regularly.      Depression Follow-Up.  Doing well on cymbalta.  Feels mood has been good.  PHQ-9 SCORE 9/1/2016 5/1/2017 5/3/2017   Total Score - - -   Total Score MyChart - 4 (Minimal depression) -   Total Score 7 - 5     GUERRERO-7 SCORE 9/1/2016 5/3/2017   Total Score 0 0       MEDICAL HISTORY:                                                      Patient Active Problem List    Diagnosis Date Noted     Adenomatous polyp of colon      Priority: Medium     4/2015 - rpt in 3 yrs.       Obstructive sleep apnea 03/02/2013     Priority: Medium     Chronic nasal congestion 03/02/2013     Priority: Medium     Delayed sleep phase syndrome 03/02/2013     Priority: Medium     Major depression      Priority: Medium     suicide attempt age 11 in [place]       Memory loss      Priority: Medium     mild cognitive dysfunction, executive function, Dr. Gerber, [facility]       Chronic pain syndrome 07/22/2010     Priority: Medium     Allergic urticaria 04/24/2010     Priority: Medium     Chronic fatigue syndrome      Priority: Medium     diagnosed at [place]       Fibromyalgia      Priority:  Medium      Past Medical History:   Diagnosis Date     Adenomatous polyp of colon     4/2015 - rpt in 3 yrs.     Anticholinergic crisis 12/10    OD of [facility]     Chronic fatigue syndrome 1/07    diagnosed at [facility] 2008     Generalized anxiety disorder      Insomnia      Major depression     suicide attempt age 11 in [place]     Memory loss     mild cognitive dysfunction, executive function, Dr. Gerber, [facility]     Obsessive-compulsive personality disorder      Rhabdomyolysis 12/10    [facility]     Past Surgical History:   Procedure Laterality Date     COLONOSCOPY  5/6/2005    WNL     Current Outpatient Prescriptions   Medication Sig Dispense Refill     pregabalin (LYRICA) 150 MG capsule Take 1 capsule (150 mg) by mouth 2 times daily 180 capsule 3     DULoxetine (CYMBALTA) 60 MG EC capsule TAKE 1 CAPSULE BY MOUTH EVERY DAY 90 capsule 1     fexofenadine (ALLEGRA) 180 MG tablet Take 1 tablet (180 mg) by mouth daily 90 tablet 2     tiZANidine (ZANAFLEX) 4 MG tablet Take 2 tablets (8 mg) by mouth At Bedtime       Cholecalciferol (VITAMIN D3 PO) Take 5,000 Units by mouth daily       calcium-vitamin D (CALCIUM 600 + D) 600-400 MG-UNIT per tablet Take 1 tablet by mouth daily       IBUPROFEN 200 MG PO CAPS 2 capsules 3 - 4 times/day PRN       MULTIVITAMINS OR TABS ONE DAILY 100 1 YEAR     FISH OIL 1000 MG OR CAPS 1 tab daily  0     TYLENOL CAPS 500 MG OR 1 CAPSULE EVERY 4 HOURS AS NEEDED       OTC products: None, except as noted above    Allergies   Allergen Reactions     Ambien      Hospitalized after prolonged, severe sleep-walking episode      Latex Allergy: NO    Social History   Substance Use Topics     Smoking status: Never Smoker     Smokeless tobacco: Never Used     Alcohol use Yes      Comment: Very occasional use (once every few months)     History   Drug Use No       REVIEW OF SYSTEMS:                                                    CONST: NEGATIVE for  "fevers/chills/sweats, unexplained weight loss/gain, and POSITIVE for fatigue  EYES: NEGATIVE for change in vision  ENT: NEGATIVE for difficulty hearing/tinnitus, and problems with teeth/gums  BREAST: NEGATIVE for breast lump/discharge  CV: NEGATIVE for chest pain/discomfort, leg pain with exercise, and palpitations  RESP: NEGATIVE for cough/wheeze, and difficulty breathing  GI: NEGATIVE for abdominal pain, blood in bowel movement, and nausea/vomiting/diarrhea  : NEGATIVE for nighttime urination, leaking urine, unusual vaginal bleeding, penile/vaginal discharge, and concerns about sexual function  MS: POSITIVE for muscle/joint pain  SKIN: NEGATIVE for rash or mole change  NEURO: NEGATIVE for headache, dizziness/lightheadedness, numbness, memory loss, and loss of coordination  PSYCH: NEGATIVE for anxiety/stress, problems with sleep, and depression  HEME: NEGATIVE for unexplained lumps, and easy bruising/bleeding  ENDO: NEGATIVE for excessive thirst or urination  ALL: NEGATIVE for hay fever/allergies     EXAM:                                                    /74 (BP Location: Right arm, Patient Position: Chair, Cuff Size: Adult Large)  Pulse 91  Temp 98.5  F (36.9  C) (Oral)  Resp 20  Ht 5' 9.75\" (1.772 m)  Wt 216 lb 8 oz (98.2 kg)  SpO2 97%  BMI 31.29 kg/m2  GEN:  no apparent distress  EYES: PERRL, conjunctivae and sclerae clear   ENT: external ears and nose without lesions or scars, TM's and canals clear bilaterally, oropharynx clear with moist mucus membranes and normal landmarks and lips, teeth, and gums with no abnormalities noted   NECK:  Supple without adenopathy, mass, or thyromegaly   LUNGS:  normal respiratory effort, and lungs clear to auscultation bilaterally - no rales, rhonchi or wheezes  CV: regular rate and rhythm, normal S1 S2, no S3 or S4, no murmur, click or rub and bilateral lower extremities without edema   ABD:  soft, nontender, no mass, no hepatosplenomegaly, no hernias  SKIN:  " normal to inspection and palpation, no rashes or abnormal-appearing lesions   PSYCH: mood/affect appear normal/bright     DIAGNOSTICS:                                                    EKG: Not indicated due to non-vascular surgery and low risk of event (age <65 and without cardiac risk factors)    Repeat hemoglobin from today is currently pending.    Results for orders placed or performed in visit on 05/03/17   Hemoglobin   Result Value Ref Range    Hemoglobin 14.5 13.3 - 17.7 g/dL   Comprehensive metabolic panel   Result Value Ref Range    Sodium 140 133 - 144 mmol/L    Potassium 4.1 3.4 - 5.3 mmol/L    Chloride 105 94 - 109 mmol/L    Carbon Dioxide 25 20 - 32 mmol/L    Anion Gap 10 3 - 14 mmol/L    Glucose 112 (H) 70 - 99 mg/dL    Urea Nitrogen 13 7 - 30 mg/dL    Creatinine 0.81 0.66 - 1.25 mg/dL    GFR Estimate >90  Non  GFR Calc   >60 mL/min/1.7m2    GFR Estimate If Black >90   GFR Calc   >60 mL/min/1.7m2    Calcium 9.1 8.5 - 10.1 mg/dL    Bilirubin Total 0.4 0.2 - 1.3 mg/dL    Albumin 4.2 3.4 - 5.0 g/dL    Protein Total 7.9 6.8 - 8.8 g/dL    Alkaline Phosphatase 59 40 - 150 U/L    ALT 43 0 - 70 U/L    AST 23 0 - 45 U/L      Recent Labs   Lab Test  05/03/17   1242  09/01/16   1756   03/18/15   1210  07/17/14   1600   HGB  14.5   --    --   15.0  14.3   PLT   --    --    --   322  327   NA  140  138   < >  137   --    POTASSIUM  4.1  4.4   < >  4.1   --    CR  0.81  0.83   < >  0.74   --     < > = values in this interval not displayed.        IMPRESSION:                                                    Reason for surgery/procedure: deviated nasal septum with chronic nasal congestion/blockage  Diagnosis/reason for consult: preoperative assessment    The proposed surgical procedure is considered INTERMEDIATE risk.    REVISED CARDIAC RISK INDEX  The patient has the following serious cardiovascular risks for perioperative complications such as (MI, PE, VFib and 3  AV Block):  No  serious cardiac risks  INTERPRETATION: 0 risks: Class I (very low risk - 0.4% complication rate)    The patient has the following additional risks for perioperative complications:  No identified additional risks      ICD-10-CM    1. Preop general physical exam Z01.818 Hemoglobin   2. Deviated nasal septum J34.2 Hemoglobin   3. Chronic nasal congestion R09.81 Hemoglobin   4. Chronic fatigue syndrome R53.82    5. Fibromyalgia M79.7    6. Chronic pain syndrome G89.4    7. Obstructive sleep apnea G47.33        RECOMMENDATIONS:                                                        Obstructive Sleep Apnea (or suspected sleep apnea)  Patient is to bring their home dental appliance with them on the day of surgery    --Patient is to take all scheduled medications on the day of surgery EXCEPT for modifications listed below.    Anticoagulant or Antiplatelet Medication Use  NSAIDS: Ibuprofen (Motrin):         Stop 3 days prior to surgery  Fish oil: Stop one week prior to surgery.        APPROVAL GIVEN to proceed with proposed procedure, without further diagnostic evaluation     Patient Instructions   Stop the fish oil for a week before surgery.    Stop the ibuprofen 3 days prior to surgery.  If you need an over-the-counter pain med use acetaminophen.    The morning of surgery take your 3 prescription medications with small sip of water.  Hold all vitamins and supplements.      Before Your Surgery      Call your surgeon if there is any change in your health. This includes signs of a cold or flu (such as a sore throat, runny nose, cough, rash or fever).    Do not smoke, drink alcohol or take over the counter medicine (unless your surgeon or primary care doctor tells you to) for the 24 hours before and after surgery.    If you take prescribed drugs: Follow your doctor s orders about which medicines to take and which to stop until after surgery.    Eating and drinking prior to surgery: follow the instructions from your  surgeon    Take a shower or bath the night before surgery. Use the soap your surgeon gave you to gently clean your skin. If you do not have soap from your surgeon, use your regular soap. Do not shave or scrub the surgery site.  Wear clean pajamas and have clean sheets on your bed.      Signed Electronically by: Danielle Orlando MD    Copy of this evaluation report is provided to requesting physician.    [facility] Preop Guidelines

## 2017-06-21 NOTE — NURSING NOTE
"Chief Complaint   Patient presents with     Pre-Op Exam       Initial /74 (BP Location: Right arm, Patient Position: Chair, Cuff Size: Adult Large)  Pulse 91  Temp 98.5  F (36.9  C) (Oral)  Resp 20  Ht 5' 9.75\" (1.772 m)  Wt 216 lb 8 oz (98.2 kg)  SpO2 97%  BMI 31.29 kg/m2 Estimated body mass index is 31.29 kg/(m^2) as calculated from the following:    Height as of this encounter: 5' 9.75\" (1.772 m).    Weight as of this encounter: 216 lb 8 oz (98.2 kg).  Medication Reconciliation: complete     Cinthya Mireles MA      "

## 2017-06-21 NOTE — MR AVS SNAPSHOT
After Visit Summary   6/21/2017    Margaret Izaguirre    MRN: [id number]           Patient Information     Date Of Birth          1965        Visit Information        Provider Department      6/21/2017 11:40 AM Danielle Orlando MD [facility]        Today's Diagnoses     Preop general physical exam    -  1    Deviated nasal septum        Chronic nasal congestion          Care Instructions    Stop the fish oil for a week before surgery.    Stop the ibuprofen 3 days prior to surgery.  If you need an over-the-counter pain med use acetaminophen.    The morning of surgery take your 3 prescription medications with small sip of water.  Hold all vitamins and supplements.      Before Your Surgery      Call your surgeon if there is any change in your health. This includes signs of a cold or flu (such as a sore throat, runny nose, cough, rash or fever).    Do not smoke, drink alcohol or take over the counter medicine (unless your surgeon or primary care doctor tells you to) for the 24 hours before and after surgery.    If you take prescribed drugs: Follow your doctor s orders about which medicines to take and which to stop until after surgery.    Eating and drinking prior to surgery: follow the instructions from your surgeon    Take a shower or bath the night before surgery. Use the soap your surgeon gave you to gently clean your skin. If you do not have soap from your surgeon, use your regular soap. Do not shave or scrub the surgery site.  Wear clean pajamas and have clean sheets on your bed.           Follow-ups after your visit        Who to contact     If you have questions or need follow up information about today's clinic visit or your schedule please contact [facility] directly at [phone].  Normal or non-critical lab and imaging results will be communicated to you by MyChart, letter or phone within 4 business days after the clinic has received the results. If you do not  "hear from us within 7 days, please contact the clinic through [employer] or phone. If you have a critical or abnormal lab result, we will notify you by phone as soon as possible.  Submit refill requests through [employer] or call your pharmacy and they will forward the refill request to us. Please allow 3 business days for your refill to be completed.          Additional Information About Your Visit        [employer] Information     [employer] gives you secure access to your electronic health record. If you see a primary care provider, you can also send messages to your care team and make appointments. If you have questions, please call your primary care clinic.  If you do not have a primary care provider, please call [phone] and they will assist you.        Care EveryWhere ID     This is your Care EveryWhere ID. This could be used by other organizations to access your [facility] medical records  [id number]        Your Vitals Were     Pulse Temperature Respirations Height Pulse Oximetry BMI (Body Mass Index)    91 98.5  F (36.9  C) (Oral) 20 5' 9.75\" (1.772 m) 97% 31.29 kg/m2       Blood Pressure from Last 3 Encounters:   06/21/17 109/74   05/03/17 125/83   04/19/17 135/83    Weight from Last 3 Encounters:   06/21/17 216 lb 8 oz (98.2 kg)   05/03/17 218 lb 4 oz (99 kg)   04/19/17 217 lb 1.6 oz (98.5 kg)              We Performed the Following     Hemoglobin        Primary Care Provider Office Phone # Fax #    Danielle Orlando [phone]       [facility] [address]  [facility] [address]        Equal Access to Services     [facility] CaliforniaSUZANNE : Hadii dayna Dumas, luma londono, kyara guallpa. So [facility] [phone].    ATENCIÓN: Si habla español, tiene a mcgowan disposición servicios gratuitos de asistencia lingüística. Luis al [phone].    We comply with applicable federal civil rights laws and [place] laws. We do not " discriminate on the basis of race, color, national origin, age, disability sex, sexual orientation or gender identity.            Thank you!     Thank you for choosing [facility]  for your care. Our goal is always to provide you with excellent care. Hearing back from our patients is one way we can continue to improve our services. Please take a few minutes to complete the written survey that you may receive in the mail after your visit with us. Thank you!             Your Updated Medication List - Protect others around you: Learn how to safely use, store and throw away your medicines at www.disposemymeds.org.          This list is accurate as of: 6/21/17 12:07 PM.  Always use your most recent med list.                   Brand Name Dispense Instructions for use Diagnosis    calcium 600 + D 600-400 MG-UNIT per tablet   Generic drug:  calcium-vitamin D      Take 1 tablet by mouth daily    Routine general medical examination at a health care facility       DULoxetine 60 MG EC capsule    CYMBALTA    90 capsule    TAKE 1 CAPSULE BY MOUTH EVERY DAY    Fibromyalgia, Chronic pain syndrome, Chronic fatigue syndrome, Recurrent major depressive disorder, in full remission (H)       fexofenadine 180 MG tablet    ALLEGRA    90 tablet    Take 1 tablet (180 mg) by mouth daily    Allergic urticaria       fish oil-omega-3 fatty acids 1000 MG capsule      1 tab daily        ibuprofen 200 MG capsule      2 capsules 3 - 4 times/day PRN        multivitamin per tablet     100    ONE DAILY        pregabalin 150 MG capsule    LYRICA    180 capsule    Take 1 capsule (150 mg) by mouth 2 times daily    Fibromyalgia       tiZANidine 4 MG tablet    ZANAFLEX     Take 2 tablets (8 mg) by mouth At Bedtime    Fibromyalgia, Chronic fatigue syndrome, Chronic pain syndrome       TYLENOL CAPS 500 MG OR      1 CAPSULE EVERY 4 HOURS AS NEEDED        VITAMIN D3 PO      Take 5,000 Units by mouth daily

## 2017-07-19 ASSESSMENT — MIFFLIN-ST. JEOR: SCORE: 1804.5

## 2017-07-20 ENCOUNTER — ANESTHESIA - HEALTHEAST (OUTPATIENT)
Dept: SURGERY | Facility: CLINIC | Age: 52
End: 2017-07-20

## 2017-07-21 ENCOUNTER — SURGERY - HEALTHEAST (OUTPATIENT)
Dept: SURGERY | Facility: CLINIC | Age: 52
End: 2017-07-21

## 2017-07-21 ENCOUNTER — TRANSFERRED RECORDS (OUTPATIENT)
Dept: HEALTH INFORMATION MANAGEMENT | Facility: CLINIC | Age: 52
End: 2017-07-21

## 2017-07-21 ASSESSMENT — MIFFLIN-ST. JEOR: SCORE: 1801.48

## 2017-07-27 ENCOUNTER — TRANSFERRED RECORDS (OUTPATIENT)
Dept: HEALTH INFORMATION MANAGEMENT | Facility: CLINIC | Age: 52
End: 2017-07-27

## 2017-08-03 ENCOUNTER — OFFICE VISIT (OUTPATIENT)
Dept: FAMILY MEDICINE | Facility: CLINIC | Age: 52
End: 2017-08-03
Payer: COMMERCIAL

## 2017-08-03 VITALS
SYSTOLIC BLOOD PRESSURE: 138 MMHG | WEIGHT: 217 LBS | RESPIRATION RATE: 14 BRPM | BODY MASS INDEX: 31.07 KG/M2 | DIASTOLIC BLOOD PRESSURE: 91 MMHG | HEIGHT: 70 IN | TEMPERATURE: 98.7 F | OXYGEN SATURATION: 98 % | HEART RATE: 95 BPM

## 2017-08-03 DIAGNOSIS — L50.9 URTICARIA: Primary | ICD-10-CM

## 2017-08-03 PROCEDURE — 99213 OFFICE O/P EST LOW 20 MIN: CPT | Performed by: FAMILY MEDICINE

## 2017-08-03 NOTE — MR AVS SNAPSHOT
After Visit Summary   8/3/2017    Margaret Izaguirre    MRN: [id number]           Patient Information     Date Of Birth          1965        Visit Information        Provider Department      8/3/2017 10:00 AM Danielle Orlando MD [facility]        Today's Diagnoses     Urticaria    -  1       Follow-ups after your visit        Additional Services     ALLERGY/ASTHMA ADULT REFERRAL       Your provider has referred you to:   [facility] - [phone]    FHN: [facility] [phone]    N: [place] [facility] [phone]   [facility] Ast[facility] [phone]       Please be aware that coverage of these services is subject to the terms and limitations of your health insurance plan.  Call member services at your health plan with any benefit or coverage questions.      Please bring the following with you to your appointment:    (1) Any X-Rays, CTs or MRIs which have been performed.  Contact the facility where they were done to arrange for  prior to your scheduled appointment.    (2) List of current medications  (3) This referral request   (4) Any documents/labs given to you for this referral                  Who to contact     If you have questions or need follow up information about today's clinic visit or your schedule please contact [facility] directly at [phone].  Normal or non-critical lab and imaging results will be communicated to you by MyChart, letter or phone within 4 business days after the clinic has received the results. If you do not hear from us within 7 days, please contact the clinic through MyChart or phone. If you have a critical or abnormal lab result, we will notify you by phone as soon as possible.  Submit refill requests through [employer] or call your pharmacy and they will forward the refill request to us. Please allow 3 business days for your refill  "to be completed.          Additional Information About Your Visit        [employer] Information     [employer] gives you secure access to your electronic health record. If you see a primary care provider, you can also send messages to your care team and make appointments. If you have questions, please call your primary care clinic.  If you do not have a primary care provider, please call [phone] and they will assist you.        Care EveryWhere ID     This is your Care EveryWhere ID. This could be used by other organizations to access your [place] medical records  [id number]        Your Vitals Were     Pulse Temperature Respirations Height Pulse Oximetry BMI (Body Mass Index)    95 98.7  F (37.1  C) (Oral) 14 5' 9.75\" (1.772 m) 98% 31.36 kg/m2       Blood Pressure from Last 3 Encounters:   08/03/17 (!) 138/91   06/21/17 109/74   05/03/17 125/83    Weight from Last 3 Encounters:   08/03/17 217 lb (98.4 kg)   06/21/17 216 lb 8 oz (98.2 kg)   05/03/17 218 lb 4 oz (99 kg)              We Performed the Following     ALLERGY/ASTHMA ADULT REFERRAL        Primary Care Provider Office Phone # Fax #    Danielle Orlando [phone]       [facility] [address]  [facility] [address]        Equal Access to Services     SETH MENDEZ : Hadii aad ku hadasho Soromuloali, waaxda luqadaha, qaybta kaalmada adeegyada, kyara garcia. So [facility] [phone].    ATENCIÓN: Si habla español, tiene a mcgowan disposición servicios gratuitos de asistencia lingüística. Llame al [phone].    We comply with applicable federal civil rights laws and [place] laws. We do not discriminate on the basis of race, color, national origin, age, disability sex, sexual orientation or gender identity.            Thank you!     Thank you for choosing [facility]  for your care. Our goal is always to provide you with excellent care. Hearing back from our patients is one way we can continue " to improve our services. Please take a few minutes to complete the written survey that you may receive in the mail after your visit with us. Thank you!             Your Updated Medication List - Protect others around you: Learn how to safely use, store and throw away your medicines at www.disposemymeds.org.          This list is accurate as of: 8/3/17 10:38 AM.  Always use your most recent med list.                   Brand Name Dispense Instructions for use Diagnosis    calcium 600 + D 600-400 MG-UNIT per tablet   Generic drug:  calcium-vitamin D      Take 1 tablet by mouth daily    Routine general medical examination at a health care facility       DULoxetine 60 MG EC capsule    CYMBALTA    90 capsule    TAKE 1 CAPSULE BY MOUTH EVERY DAY    Fibromyalgia, Chronic pain syndrome, Chronic fatigue syndrome, Recurrent major depressive disorder, in full remission (H)       fexofenadine 180 MG tablet    ALLEGRA    90 tablet    Take 1 tablet (180 mg) by mouth daily    Allergic urticaria       fish oil-omega-3 fatty acids 1000 MG capsule      1 tab daily        ibuprofen 200 MG capsule      2 capsules 3 - 4 times/day PRN        multivitamin per tablet     100    ONE DAILY        pregabalin 150 MG capsule    LYRICA    180 capsule    Take 1 capsule (150 mg) by mouth 2 times daily    Fibromyalgia       tiZANidine 4 MG tablet    ZANAFLEX     Take 2 tablets (8 mg) by mouth At Bedtime    Fibromyalgia, Chronic fatigue syndrome, Chronic pain syndrome       TYLENOL CAPS 500 MG OR      1 CAPSULE EVERY 4 HOURS AS NEEDED        VITAMIN D3 PO      Take 5,000 Units by mouth daily

## 2017-08-03 NOTE — NURSING NOTE
"Chief Complaint   Patient presents with     Hives       Initial BP (!) 138/91 (BP Location: Left arm)  Pulse 95  Temp 98.7  F (37.1  C) (Oral)  Resp 14  Ht 5' 9.75\" (1.772 m)  Wt 217 lb (98.4 kg)  SpO2 98%  BMI 31.36 kg/m2 Estimated body mass index is 31.36 kg/(m^2) as calculated from the following:    Height as of this encounter: 5' 9.75\" (1.772 m).    Weight as of this encounter: 217 lb (98.4 kg).  Medication Reconciliation: complete     Kaylee Correa CMA      "

## 2017-08-03 NOTE — PROGRESS NOTES
SUBJECTIVE:                                                    Margaret Izaguirre is a 52 year old male who presents to clinic today for the following health issues:      Hives    Duration: 3 weeks     Description  Location: mostly upper back, red, bumpy, itchy  Itching: severe    Intensity:  severe    Accompanying signs and symptoms: None    History (similar episodes/previous evaluation): chronic hives in the past, allergist evaluated    Precipitating or alleviating factors:  New exposures:  None  Recent travel: no      Therapies tried and outcome: Allegra/fexofenadine -  not effective    Started on scalp then spread down to the waist.  No new foods, personal care products, or other new exposures.  History of chronic hives since 2003 - idiopathic per allergist eval.  Has been on Allegra since then which has controlled it.  Now Allegra is not controlling it so he's resorted to benadryl 50 mg every 4 hours to moderate control of the hives.   Did have septoplasty recently, followed by course of antibiotics, but hives started before that.  Last dose of benadryl was 2 hours ago.    Problem list and histories reviewed & adjusted, as indicated.  Additional history: as documented    Past Medical History:   Diagnosis Date     Adenomatous polyp of colon     4/2015 - rpt in 3 yrs.     Anticholinergic crisis 12/10    OD of flexeril - [facility]     Chronic fatigue syndrome 1/07    diagnosed at [facility]     Depressive disorder 1994    Cleared in 2001     Fibromyalgia 2008     Generalized anxiety disorder      Insomnia      Major depression     suicide attempt age 11 in [place]     Memory loss     mild cognitive dysfunction, executive function, Dr. Gerber, [facility]     Obsessive-compulsive personality disorder      Rhabdomyolysis 12/10    [facility]     Patient Active Problem List   Diagnosis     Chronic fatigue syndrome     Fibromyalgia     Allergic urticaria     Chronic pain syndrome     Memory loss     Major  "depression     Obstructive sleep apnea     Chronic nasal congestion     Delayed sleep phase syndrome     Adenomatous polyp of colon        BP Readings from Last 3 Encounters:   08/03/17 (!) 138/91   06/21/17 109/74   05/03/17 125/83    Wt Readings from Last 3 Encounters:   08/03/17 217 lb (98.4 kg)   06/21/17 216 lb 8 oz (98.2 kg)   05/03/17 218 lb 4 oz (99 kg)           Reviewed and updated as needed this visit by clinical staffTobacco  Allergies  Meds         ROS:  ALL: NEGATIVE for throat itching/swelling, lip swelling    OBJECTIVE:     BP (!) 138/91 (BP Location: Left arm)  Pulse 95  Temp 98.7  F (37.1  C) (Oral)  Resp 14  Ht 5' 9.75\" (1.772 m)  Wt 217 lb (98.4 kg)  SpO2 98%  BMI 31.36 kg/m2  Body mass index is 31.36 kg/(m^2).  GEN:  no apparent distress  SKIN:  No skin wheels or evidence of urticaria currently but he does have excoriations over torso and upper arms.         ASSESSMENT/PLAN:       1. Urticaria  By patient report in patient with history of chronic urticaria.  He is managing current symptoms with benadryl and does not report any oversedation with that.  He'll continue on the benadryl and I did refer him to allergy for further eval.    - ALLERGY/ASTHMA ADULT REFERRAL      Danielle Orlando MD  [facility] [place]  "

## 2017-08-15 ENCOUNTER — TRANSFERRED RECORDS (OUTPATIENT)
Dept: HEALTH INFORMATION MANAGEMENT | Facility: CLINIC | Age: 52
End: 2017-08-15

## 2017-08-15 ENCOUNTER — MYC MEDICAL ADVICE (OUTPATIENT)
Dept: FAMILY MEDICINE | Facility: CLINIC | Age: 52
End: 2017-08-15

## 2017-08-22 PROBLEM — L50.8 CHRONIC URTICARIA: Status: ACTIVE | Noted: 2017-08-22

## 2017-08-22 RX ORDER — CETIRIZINE HYDROCHLORIDE 10 MG/1
10 TABLET ORAL 2 TIMES DAILY
COMMUNITY
Start: 2017-08-15 | End: 2021-02-24

## 2017-12-06 ENCOUNTER — OFFICE VISIT (OUTPATIENT)
Dept: FAMILY MEDICINE | Facility: CLINIC | Age: 52
End: 2017-12-06
Payer: COMMERCIAL

## 2017-12-06 VITALS
HEART RATE: 86 BPM | WEIGHT: 214 LBS | DIASTOLIC BLOOD PRESSURE: 79 MMHG | BODY MASS INDEX: 30.93 KG/M2 | TEMPERATURE: 98.1 F | SYSTOLIC BLOOD PRESSURE: 117 MMHG | OXYGEN SATURATION: 96 % | RESPIRATION RATE: 16 BRPM

## 2017-12-06 DIAGNOSIS — F33.42 RECURRENT MAJOR DEPRESSIVE DISORDER, IN FULL REMISSION (H): ICD-10-CM

## 2017-12-06 DIAGNOSIS — G89.4 CHRONIC PAIN SYNDROME: ICD-10-CM

## 2017-12-06 DIAGNOSIS — Z23 NEED FOR PROPHYLACTIC VACCINATION AND INOCULATION AGAINST INFLUENZA: ICD-10-CM

## 2017-12-06 DIAGNOSIS — G93.32 CHRONIC FATIGUE SYNDROME: ICD-10-CM

## 2017-12-06 DIAGNOSIS — M79.7 FIBROMYALGIA: Primary | ICD-10-CM

## 2017-12-06 PROCEDURE — 90471 IMMUNIZATION ADMIN: CPT | Performed by: FAMILY MEDICINE

## 2017-12-06 PROCEDURE — 90686 IIV4 VACC NO PRSV 0.5 ML IM: CPT | Performed by: FAMILY MEDICINE

## 2017-12-06 PROCEDURE — 99214 OFFICE O/P EST MOD 30 MIN: CPT | Mod: 25 | Performed by: FAMILY MEDICINE

## 2017-12-06 RX ORDER — DULOXETIN HYDROCHLORIDE 20 MG/1
20 CAPSULE, DELAYED RELEASE ORAL 2 TIMES DAILY
Qty: 21 CAPSULE | Refills: 1 | Status: SHIPPED | OUTPATIENT
Start: 2017-12-06 | End: 2018-01-11

## 2017-12-06 ASSESSMENT — ANXIETY QUESTIONNAIRES
7. FEELING AFRAID AS IF SOMETHING AWFUL MIGHT HAPPEN: NOT AT ALL
6. BECOMING EASILY ANNOYED OR IRRITABLE: NOT AT ALL
1. FEELING NERVOUS, ANXIOUS, OR ON EDGE: NOT AT ALL
5. BEING SO RESTLESS THAT IT IS HARD TO SIT STILL: NOT AT ALL
2. NOT BEING ABLE TO STOP OR CONTROL WORRYING: NOT AT ALL
3. WORRYING TOO MUCH ABOUT DIFFERENT THINGS: NOT AT ALL
GAD7 TOTAL SCORE: 0

## 2017-12-06 ASSESSMENT — PATIENT HEALTH QUESTIONNAIRE - PHQ9
5. POOR APPETITE OR OVEREATING: NOT AT ALL
SUM OF ALL RESPONSES TO PHQ QUESTIONS 1-9: 5

## 2017-12-06 NOTE — PROGRESS NOTES
SUBJECTIVE:   Margaret Izaguirre is a 52 year old male who presents to clinic today for the following health issues:    Fibromyalgia and CFS Follow-Up   Margaret has longstanding Fibromyalgia and Chronic Fatigue Syndrome.  I've been seeing him for this since July 2009 though the diagnoses were made in 2008 and 2007 respectively.    His clinical course had been one of intermittent exacerbations that required limited FMLA leaves from work until Feb 2015.  During the winter of 2014/2015 the exacerbations became worse in severity and duration and he has not been able to work at his [employer] job since Feb 2015.  He has been on long term disability leave from his employer since Feb 2015.  He also gets disability benefits through [employer]  He has undergone an independent medical exam in 2015 with OT functional assessment (Physical Performance Test) for [employer].      Today he reports that he continues to work with the [facility].  He sees them on a monthly basis.  He notes he's had more trouble with pain over the past few weeks.  It has been interrupting his sleep.  Fatigue has been stable.    Fibro Nurse has suggested switching Cymbalta to Savella.    He'd like to try that.      Patient also has new disability form that he needs completed.   He notes no significant change in his overall functional capacity since his Physical Performance Test in 12/2015.      PHQ-9 SCORE 5/1/2017 5/3/2017 12/6/2017   Total Score - - -   Total Score MyChart 4 (Minimal depression) - -   Total Score - 5 5     GUERRERO-7 SCORE 9/1/2016 5/3/2017 12/6/2017   Total Score 0 0 0          Problem list and histories reviewed & adjusted, as indicated.  Additional history: as documented    BP Readings from Last 3 Encounters:   12/06/17 117/79   08/03/17 (!) 138/91   06/21/17 109/74    Wt Readings from Last 3 Encounters:   12/06/17 214 lb (97.1 kg)   08/03/17 217 lb (98.4 kg)   06/21/17 216 lb 8 oz (98.2 kg)             Reviewed and updated as needed this visit by clinical staff  Tobacco  Allergies  Meds  Med Hx  Surg Hx  Fam Hx  Soc Hx          OBJECTIVE:     /79  Pulse 86  Temp 98.1  F (36.7  C) (Tympanic)  Resp 16  Wt 214 lb (97.1 kg)  SpO2 96%  BMI 30.93 kg/m2  Body mass index is 30.93 kg/(m^2).  GEN:  no apparent distress  PSYCH:    Appearance: appropriately and casually dressed    Eye Contact: good  Attitude: cooperative    Speech:  normal rate, rhythm, and tone    Thought Form: organized    Thought Content: no evidence of psychotic thought    Mood: fair    Affect: intensity is blunted    Insight: good       ASSESSMENT/PLAN:     1. Fibromyalgia  2. Chronic pain syndrome  3. Chronic fatigue syndrome  4. Recurrent major depressive disorder, in full remission (H)  Mood is adequately controlled but he is having more fibromyalgia pain issues.  Agree with trial of Savella.  Discussed that Cymbalta and Savella are both SNRI's.  I gave him a schedule to wean the cymbalta and start the Savella.  I will complete his disability forms based on his previous functional assessment 2 years ago as his status has not changed.  If needed we could have him repeat the full functional assessment.    - milnacipran HCl 12.5 & 25 & 50 MG MISC; Follow directions on titration pack to goal dose of 50 mg orally twice daily  Dispense: 55 each; Refill: 0  - DULoxetine (CYMBALTA) 20 MG EC capsule; Take 1 capsule (20 mg) by mouth 2 times daily for a week then once daily for a week then stop  Dispense: 21 capsule; Refill: 1    5. Need for prophylactic vaccination and inoculation against influenza  - FLU VAC, SPLIT VIRUS IM > 3 YO (QUADRIVALENT) [40886]  - Vaccine Administration, Initial [84736]         Danielle Orlando MD  [facility]        Injectable Influenza Immunization Documentation    1.  Is the person to be vaccinated sick today?   No    2. Does the person to be vaccinated have an allergy to a component   of  the vaccine?   No  Egg Allergy Algorithm Link    3. Has the person to be vaccinated ever had a serious reaction   to influenza vaccine in the past?   No    4. Has the person to be vaccinated ever had Guillain-Barré syndrome?   No    Form completed by Diana Jones MA

## 2017-12-06 NOTE — MR AVS SNAPSHOT
After Visit Summary   12/6/2017    Margaret Izaguirre    MRN: [id number]           Patient Information     Date Of Birth          1965        Visit Information        Provider Department      12/6/2017 8:40 AM Danielle Orlando MD [facility]        Today's Diagnoses     Need for prophylactic vaccination and inoculation against influenza    -  1    Fibromyalgia        Chronic pain syndrome        Chronic fatigue syndrome        Recurrent major depressive disorder, in full remission (H)          Care Instructions    Week 1: Decrease cymbalta dose to 20 mg twice daily    Week 2: Decrease cymbalta dose to 20 mg once daily AND start titration schedule for Savella.    If BP is 140/90 or above please see me in one month for recheck.  Try to decrease salt in your diet and if possible cut down on the caffeine.          Follow-ups after your visit        Who to contact     If you have questions or need follow up information about today's clinic visit or your schedule please contact [facility] directly at [phone].  Normal or non-critical lab and imaging results will be communicated to you by [employer], letter or phone within 4 business days after the clinic has received the results. If you do not hear from us within 7 days, please contact the clinic through [employer] or phone. If you have a critical or abnormal lab result, we will notify you by phone as soon as possible.  Submit refill requests through [employer] or call your pharmacy and they will forward the refill request to us. Please allow 3 business days for your refill to be completed.          Additional Information About Your Visit        [employer] Information     [employer] gives you secure access to your electronic health record. If you see a primary care provider, you can also send messages to your care team and make appointments. If you have questions, please call your primary care clinic.  If you do not have a primary care  provider, please call [phone] and they will assist you.        Care EveryWhere ID     This is your Care EveryWhere ID. This could be used by other organizations to access your [place] medical records  [id number]        Your Vitals Were     Pulse Temperature Respirations Pulse Oximetry BMI (Body Mass Index)       86 98.1  F (36.7  C) (Tympanic) 16 96% 30.93 kg/m2        Blood Pressure from Last 3 Encounters:   12/06/17 147/90   08/03/17 (!) 138/91   06/21/17 109/74    Weight from Last 3 Encounters:   12/06/17 214 lb (97.1 kg)   08/03/17 217 lb (98.4 kg)   06/21/17 216 lb 8 oz (98.2 kg)              We Performed the Following     FLU VAC, SPLIT VIRUS IM > 3 YO (QUADRIVALENT) [51770]     Vaccine Administration, Initial [64363]          Today's Medication Changes          These changes are accurate as of: 12/6/17  9:38 AM.  If you have any questions, ask your nurse or doctor.               Start taking these medicines.        Dose/Directions    milnacipran HCl 12.5 & 25 & 50 MG Misc   Used for:  Fibromyalgia, Chronic pain syndrome, Chronic fatigue syndrome, Recurrent major depressive disorder, in full remission (H)   Started by:  Danielle Orlando MD        Follow directions on titration pack to goal dose of 50 mg orally twice daily   Quantity:  55 each   Refills:  0         These medicines have changed or have updated prescriptions.        Dose/Directions    * DULoxetine 60 MG EC capsule   Commonly known as:  CYMBALTA   This may have changed:  Another medication with the same name was added. Make sure you understand how and when to take each.   Used for:  Fibromyalgia, Chronic pain syndrome, Chronic fatigue syndrome, Recurrent major depressive disorder, in full remission (H)   Changed by:  Danielle Orlando MD        TAKE 1 CAPSULE BY MOUTH EVERY DAY   Quantity:  90 capsule   Refills:  1       * DULoxetine 20 MG EC capsule   Commonly known as:  CYMBALTA   This may have changed:  You were already taking a  medication with the same name, and this prescription was added. Make sure you understand how and when to take each.   Used for:  Fibromyalgia, Chronic pain syndrome, Chronic fatigue syndrome, Recurrent major depressive disorder, in full remission (H)   Changed by:  Danielle Orlando MD        Dose:  20 mg   Take 1 capsule (20 mg) by mouth 2 times daily for a week then once daily for a week then stop   Quantity:  21 capsule   Refills:  1       * Notice:  This list has 2 medication(s) that are the same as other medications prescribed for you. Read the directions carefully, and ask your doctor or other care provider to review them with you.         Where to get your medicines      These medications were sent to [facility] [address] [address] [facility] [place] [address] [facility] [place] [address]     Phone:  [phone]     DULoxetine 20 MG EC capsule    milnacipran HCl 12.5 & 25 & 50 MG [facility]                Primary Care Provider Office Phone # Fax #    Danielle Orlando [phone] [address]  [facility] [address]        Equal Access to Services     NIRMAL [employer]NE AH: Hadii dayna montañoo Sonohemy, waaxda luqadaha, qaybta kaalmada adebrendanyada, kyara cole . So [facility] [phone].    ATENCIÓN: Si habla español, tiene a mcgowan disposición servicios gratuitos de asistencia lingüística. [facility] [phone].    We comply with applicable federal civil rights laws and [place] laws. We do not discriminate on the basis of race, color, national origin, age, disability, sex, sexual orientation, or gender identity.            Thank you!     Thank you for choosing [facility]  for your care. Our goal is always to provide you with excellent care. Hearing back from our patients is one way we can continue to improve our services. Please take a few minutes to complete the written survey that you may  receive in the mail after your visit with us. Thank you!             Your Updated Medication List - Protect others around you: Learn how to safely use, store and throw away your medicines at www.disposemymeds.org.          This list is accurate as of: 12/6/17  9:38 AM.  Always use your most recent med list.                   Brand Name Dispense Instructions for use Diagnosis    calcium 600 + D 600-400 MG-UNIT per tablet   Generic drug:  calcium-vitamin D      Take 1 tablet by mouth daily    Routine general medical examination at a health care facility       cetirizine 10 MG tablet    zyrTEC     Take 1 tablet (10 mg) by mouth 2 times daily        * DULoxetine 60 MG EC capsule    CYMBALTA    90 capsule    TAKE 1 CAPSULE BY MOUTH EVERY DAY    Fibromyalgia, Chronic pain syndrome, Chronic fatigue syndrome, Recurrent major depressive disorder, in full remission (H)       * DULoxetine 20 MG EC capsule    CYMBALTA    21 capsule    Take 1 capsule (20 mg) by mouth 2 times daily for a week then once daily for a week then stop    Fibromyalgia, Chronic pain syndrome, Chronic fatigue syndrome, Recurrent major depressive disorder, in full remission (H)       fish oil-omega-3 fatty acids 1000 MG capsule      1 tab daily        ibuprofen 200 MG capsule      2 capsules 3 - 4 times/day PRN        milnacipran HCl 12.5 & 25 & 50 MG Misc     55 each    Follow directions on titration pack to goal dose of 50 mg orally twice daily    Fibromyalgia, Chronic pain syndrome, Chronic fatigue syndrome, Recurrent major depressive disorder, in full remission (H)       multivitamin per tablet     100    ONE DAILY        pregabalin 150 MG capsule    LYRICA    180 capsule    Take 1 capsule (150 mg) by mouth 2 times daily    Fibromyalgia       tiZANidine 4 MG tablet    ZANAFLEX     Take 2 tablets (8 mg) by mouth At Bedtime    Fibromyalgia, Chronic fatigue syndrome, Chronic pain syndrome       TYLENOL CAPS 500 MG OR      1 CAPSULE EVERY 4 HOURS AS NEEDED         VITAMIN D3 PO      Take 5,000 Units by mouth daily        * Notice:  This list has 2 medication(s) that are the same as other medications prescribed for you. Read the directions carefully, and ask your doctor or other care provider to review them with you.

## 2017-12-06 NOTE — NURSING NOTE
Per orders of Dr. Orlando, injection of flu shot  given by EDDIE BENTLEY Patient instructed to remain in clinic for 15 minutes afterwards, and to report any adverse reaction to me immediately.

## 2017-12-06 NOTE — PATIENT INSTRUCTIONS
Week 1: Decrease cymbalta dose to 20 mg twice daily    Week 2: Decrease cymbalta dose to 20 mg once daily AND start titration schedule for Savella.    If BP is 140/90 or above please see me in one month for recheck.  Try to decrease salt in your diet and if possible cut down on the caffeine.

## 2017-12-07 ASSESSMENT — ANXIETY QUESTIONNAIRES: GAD7 TOTAL SCORE: 0

## 2017-12-08 ENCOUNTER — TELEPHONE (OUTPATIENT)
Dept: FAMILY MEDICINE | Facility: CLINIC | Age: 52
End: 2017-12-08

## 2017-12-08 NOTE — TELEPHONE ENCOUNTER
Mailed in the completed form for [employer]. PCP want a copy for pt as well. MA mailing out COPY to pt's address.    Radha Calix MA

## 2018-01-11 ENCOUNTER — E-VISIT (OUTPATIENT)
Dept: FAMILY MEDICINE | Facility: CLINIC | Age: 53
End: 2018-01-11
Payer: COMMERCIAL

## 2018-01-11 ENCOUNTER — MYC MEDICAL ADVICE (OUTPATIENT)
Dept: FAMILY MEDICINE | Facility: CLINIC | Age: 53
End: 2018-01-11

## 2018-01-11 DIAGNOSIS — M79.7 FIBROMYALGIA: ICD-10-CM

## 2018-01-11 DIAGNOSIS — G89.4 CHRONIC PAIN SYNDROME: ICD-10-CM

## 2018-01-11 DIAGNOSIS — F33.42 RECURRENT MAJOR DEPRESSIVE DISORDER, IN FULL REMISSION (H): ICD-10-CM

## 2018-01-11 DIAGNOSIS — G93.32 CHRONIC FATIGUE SYNDROME: ICD-10-CM

## 2018-01-11 PROCEDURE — 99444 ZZC PHYSICIAN ONLINE EVALUATION & MANAGEMENT SERVICE: CPT | Performed by: FAMILY MEDICINE

## 2018-01-11 RX ORDER — CETIRIZINE HYDROCHLORIDE 10 MG/1
TABLET ORAL
Qty: 60 TABLET | Refills: 0 | OUTPATIENT
Start: 2018-01-11

## 2018-01-11 RX ORDER — TEMAZEPAM 15 MG/1
1-2 CAPSULE ORAL
Refills: 1 | COMMUNITY
Start: 2018-01-04 | End: 2018-04-25

## 2018-01-11 NOTE — TELEPHONE ENCOUNTER
Patient is asking about her change in medication from Cymbalta to Savella.  Routing to provider   Emily Bergeron RN

## 2018-01-11 NOTE — TELEPHONE ENCOUNTER
Given that he is having issues with the change, this should come as an e-visit.    Danielle Orlando MD

## 2018-03-07 DIAGNOSIS — F33.42 RECURRENT MAJOR DEPRESSIVE DISORDER, IN FULL REMISSION (H): ICD-10-CM

## 2018-03-07 DIAGNOSIS — G89.4 CHRONIC PAIN SYNDROME: ICD-10-CM

## 2018-03-07 DIAGNOSIS — G93.32 CHRONIC FATIGUE SYNDROME: ICD-10-CM

## 2018-03-07 DIAGNOSIS — M79.7 FIBROMYALGIA: ICD-10-CM

## 2018-03-08 RX ORDER — MILNACIPRAN HYDROCHLORIDE 100 MG/1
TABLET, FILM COATED ORAL
Qty: 60 TABLET | Refills: 0 | Status: SHIPPED | OUTPATIENT
Start: 2018-03-08 | End: 2018-04-06

## 2018-03-08 NOTE — TELEPHONE ENCOUNTER
milnacipran (SAVELLA) 100 MG TABS tablet      Last Written Prescription Date:  1/11/2018  Last Fill Quantity: 60 tablet,  # refills: 1   Last Office Visit: 12/6/2017   Future Office Visit:         Routing refill request to provider for review/approval because:  Drug not on the [facility], [facility] or [facility] refill protocol or controlled substance

## 2018-03-08 NOTE — TELEPHONE ENCOUNTER
Routing refill request to provider for review/approval because:  Drug not on the [facility] refill protocol       Covering providers-Please sign if agree.    Thank you!  CARLITA Velez, CECILN, RN

## 2018-04-06 DIAGNOSIS — G93.32 CHRONIC FATIGUE SYNDROME: ICD-10-CM

## 2018-04-06 DIAGNOSIS — M79.7 FIBROMYALGIA: ICD-10-CM

## 2018-04-06 DIAGNOSIS — F33.42 RECURRENT MAJOR DEPRESSIVE DISORDER, IN FULL REMISSION (H): ICD-10-CM

## 2018-04-06 DIAGNOSIS — G89.4 CHRONIC PAIN SYNDROME: ICD-10-CM

## 2018-04-06 NOTE — TELEPHONE ENCOUNTER
Medication Detail      Disp Refills Start End LINDA   SAVELLA 100 MG TABS tablet 60 tablet 0 3/8/2018  No   Sig: TAKE 1 TABLET(100 MG) BY MOUTH TWICE DAILY   Class: E-Prescribe   Order: [id number]   E-Prescribing Status: Receipt confirmed by pharmacy (3/8/2018  3:55 PM CST)       Last Office Visit: 12/6/2017  Future Office visit:    Next 5 appointments (look out 90 days)     Apr 16, 2018 11:20 AM CDT   MyChart Physical Adult with Danielle Orlando MD   [facility] ([facility])    [address] [place] [id number] [phone]                   Routing refill request to provider for review/approval because:  Drug not on the [facility], [facility] or [facility] refill protocol or controlled substance

## 2018-04-11 RX ORDER — MILNACIPRAN HYDROCHLORIDE 100 MG/1
TABLET, FILM COATED ORAL
Qty: 180 TABLET | Refills: 1 | Status: SHIPPED | OUTPATIENT
Start: 2018-04-11 | End: 2018-10-16

## 2018-04-11 RX ORDER — MILNACIPRAN HYDROCHLORIDE 100 MG/1
TABLET, FILM COATED ORAL
Qty: 60 TABLET | Refills: 0 | OUTPATIENT
Start: 2018-04-11

## 2018-04-11 NOTE — TELEPHONE ENCOUNTER
Routing refill request to provider for review/approval because:  Drug not on the [facility] refill protocol       Dr. Orlando-Please sign if agree.    Thank you!  CECIL RivasN, RN

## 2018-04-25 ENCOUNTER — OFFICE VISIT (OUTPATIENT)
Dept: FAMILY MEDICINE | Facility: CLINIC | Age: 53
End: 2018-04-25
Payer: COMMERCIAL

## 2018-04-25 VITALS
BODY MASS INDEX: 27.54 KG/M2 | SYSTOLIC BLOOD PRESSURE: 124 MMHG | OXYGEN SATURATION: 98 % | DIASTOLIC BLOOD PRESSURE: 81 MMHG | WEIGHT: 196.75 LBS | HEART RATE: 96 BPM | TEMPERATURE: 97.7 F | RESPIRATION RATE: 18 BRPM | HEIGHT: 71 IN

## 2018-04-25 DIAGNOSIS — Z51.81 MEDICATION MONITORING ENCOUNTER: ICD-10-CM

## 2018-04-25 DIAGNOSIS — M79.7 FIBROMYALGIA: ICD-10-CM

## 2018-04-25 DIAGNOSIS — Z12.11 SCREEN FOR COLON CANCER: ICD-10-CM

## 2018-04-25 DIAGNOSIS — Z00.00 ROUTINE GENERAL MEDICAL EXAMINATION AT A HEALTH CARE FACILITY: Primary | ICD-10-CM

## 2018-04-25 DIAGNOSIS — Z12.5 SCREENING PSA (PROSTATE SPECIFIC ANTIGEN): ICD-10-CM

## 2018-04-25 DIAGNOSIS — G93.32 CHRONIC FATIGUE SYNDROME: ICD-10-CM

## 2018-04-25 PROCEDURE — 80053 COMPREHEN METABOLIC PANEL: CPT | Performed by: FAMILY MEDICINE

## 2018-04-25 PROCEDURE — 99396 PREV VISIT EST AGE 40-64: CPT | Performed by: FAMILY MEDICINE

## 2018-04-25 PROCEDURE — 36415 COLL VENOUS BLD VENIPUNCTURE: CPT | Performed by: FAMILY MEDICINE

## 2018-04-25 PROCEDURE — G0103 PSA SCREENING: HCPCS | Performed by: FAMILY MEDICINE

## 2018-04-25 PROCEDURE — 80061 LIPID PANEL: CPT | Performed by: FAMILY MEDICINE

## 2018-04-25 RX ORDER — PREGABALIN 150 MG/1
150 CAPSULE ORAL 2 TIMES DAILY
Qty: 180 CAPSULE | Refills: 3 | Status: SHIPPED | OUTPATIENT
Start: 2018-04-25 | End: 2019-05-26

## 2018-04-25 NOTE — LETTER
My Depression Action Plan  Name: Margaret Izaguirre   Date of Birth 1965  Date: 4/25/2018    My doctor: Danielle Orlando   My clinic: [address] [address] [phone]          GREEN    ZONE   Good Control    What it looks like:     Things are going generally well. You have normal up s and down s. You may even feel depressed from time to time, but bad moods usually last less than a day.   What you need to do:  1. Continue to care for yourself (see self care plan)  2. Check your depression survival kit and update it as needed  3. Follow your physician s recommendations including any medication.  4. Do not stop taking medication unless you consult with your physician first.           YELLOW         ZONE Getting Worse    What it looks like:     Depression is starting to interfere with your life.     It may be hard to get out of bed; you may be starting to isolate yourself from others.    Symptoms of depression are starting to last most all day and this has happened for several days.     You may have suicidal thoughts but they are not constant.   What you need to do:     1. Call your care team, your response to treatment will improve if you keep your care team informed of your progress. Yellow periods are signs an adjustment may need to be made.     2. Continue your self-care, even if you have to fake it!    3. Talk to someone in your support network    4. Open up your depression survival kit           RED    ZONE Medical Alert - Get Help    What it looks like:     Depression is seriously interfering with your life.     You may experience these or other symptoms: You can t get out of bed most days, can t work or engage in other necessary activities, you have trouble taking care of basic hygiene, or basic responsibilities, thoughts of suicide or death that will not go away, self-injurious behavior.     What you need to do:  1. Call your care team and  request a same-day appointment. If they are not available (weekends or after hours) call your local crisis line, emergency room or 911.            Depression Self Care Plan / Survival Kit    Self-Care for Depression  Here s the deal. Your body and mind are really not as separate as most people think.  What you do and think affects how you feel and how you feel influences what you do and think. This means if you do things that people who feel good do, it will help you feel better.  Sometimes this is all it takes.  There is also a place for medication and therapy depending on how severe your depression is, so be sure to consult with your medical provider and/ or Behavioral Health Consultant if your symptoms are worsening or not improving.     In order to better manage my stress, I will:    Exercise  Get some form of exercise, every day. This will help reduce pain and release endorphins, the  feel good  chemicals in your brain. This is almost as good as taking antidepressants!  This is not the same as joining a gym and then never going! (they count on that by the way ) It can be as simple as just going for a walk or doing some gardening, anything that will get you moving.      Hygiene   Maintain good hygiene (Get out of bed in the morning, Make your bed, Brush your teeth, Take a shower, and Get dressed like you were going to work, even if you are unemployed).  If your clothes don't fit try to get ones that do.    Diet  I will strive to eat foods that are good for me, drink plenty of water, and avoid excessive sugar, caffeine, alcohol, and other mood-altering substances.  Some foods that are helpful in depression are: complex carbohydrates, B vitamins, flaxseed, fish or fish oil, fresh fruits and vegetables.    Psychotherapy  I agree to participate in Individual Therapy (if recommended).    Medication  If prescribed medications, I agree to take them.  Missing doses can result in serious side effects.  I understand that  drinking alcohol, or other illicit drug use, may cause potential side effects.  I will not stop my medication abruptly without first discussing it with my provider.    Staying Connected With Others  I will stay in touch with my friends, family members, and my primary care provider/team.    Use your imagination  Be creative.  We all have a creative side; it doesn t matter if it s oil painting, sand castles, or mud pies! This will also kick up the endorphins.    Witness Beauty  (AKA stop and smell the roses) Take a look outside, even in mid-winter. Notice colors, textures. Watch the squirrels and birds.     Service to others  Be of service to others.  There is always someone else in need.  By helping others we can  get out of ourselves  and remember the really important things.  This also provides opportunities for practicing all the other parts of the program.    Humor  Laugh and be silly!  Adjust your TV habits for less news and crime-drama and more comedy.    Control your stress  Try breathing deep, massage therapy, biofeedback, and meditation. Find time to relax each day.     My support system    Clinic Contact:  Phone number:    Contact 1:  Phone number:    Contact 2:  Phone number:    Protestant/:  Phone number:    Therapist:  Phone number:    Local crisis center:    Phone number:    Other community support:  Phone number:

## 2018-04-25 NOTE — MR AVS SNAPSHOT
After Visit Summary   4/25/2018    Margaret Izaguirre    MRN: [id number]           Patient Information     Date Of Birth          1965        Visit Information        Provider Department      4/25/2018 11:40 AM Danielle Orlando MD [facility]        Today's Diagnoses     Routine general medical examination at a health care facility    -  1    Screen for colon cancer        Fibromyalgia        Screening PSA (prostate specific antigen)          Care Instructions      Preventive Health Recommendations  Male Ages 50 - 64    Yearly exam:             See your health care provider every year in order to  o   Review health changes.   o   Discuss preventive care.    o   Review your medicines if your doctor has prescribed any.     Have a cholesterol test every 5 years, or more frequently if you are at risk for high cholesterol/heart disease.     Have a diabetes test (fasting glucose) every three years. If you are at risk for diabetes, you should have this test more often.     Have a colonoscopy at age 50, or have a yearly FIT test (stool test). These exams will check for colon cancer.      Talk with your health care provider about whether or not a prostate cancer screening test (PSA) is right for you.    You should be tested each year for STDs (sexually transmitted diseases), if you re at risk.     Shots: Get a flu shot each year. Get a tetanus shot every 10 years.     Nutrition:    Eat at least 5 servings of fruits and vegetables daily.     Eat whole-grain bread, whole-wheat pasta and brown rice instead of white grains and rice.     Talk to your provider about Calcium and Vitamin D.     Lifestyle    Exercise for at least 150 minutes a week (30 minutes a day, 5 days a week). This will help you control your weight and prevent disease.     Limit alcohol to one drink per day.     No smoking.     Wear sunscreen to prevent skin cancer.     See your dentist every six months for an exam and cleaning.      See your eye doctor every 1 to 2 years.            Follow-ups after your visit        Additional Services     GASTROENTEROLOGY ADULT REF PROCEDURE ONLY [facility] [phone]       Last Lab Result: Creatinine (mg/dL)       Date                     Value                 05/03/2017               0.81             ----------  There is no height or weight on file to calculate BMI.     Needed:  No  Language:  English    Patient will be contacted to schedule procedure.     Please be aware that coverage of these services is subject to the terms and limitations of your health insurance plan.  Call member services at your health plan with any benefit or coverage questions.  Any procedures must be performed at a [facility] facility OR coordinated by your clinic's referral office.    Please bring the following with you to your appointment:    (1) Any X-Rays, CTs or MRIs which have been performed.  Contact the facility where they were done to arrange for  prior to your scheduled appointment.    (2) List of current medications   (3) This referral request   (4) Any documents/labs given to you for this referral                  Who to contact     If you have questions or need follow up information about today's clinic visit or your schedule please contact [facility] directly at [phone].  Normal or non-critical lab and imaging results will be communicated to you by MyChart, letter or phone within 4 business days after the clinic has received the results. If you do not hear from us within 7 days, please contact the clinic through MyChart or phone. If you have a critical or abnormal lab result, we will notify you by phone as soon as possible.  Submit refill requests through [email or web address] or call your pharmacy and they will forward the refill request to us. Please allow 3 business days for your refill to be completed.          Additional Information About Your Visit        MyChart Information   "   [employer] gives you secure access to your electronic health record. If you see a primary care provider, you can also send messages to your care team and make appointments. If you have questions, please call your primary care clinic.  If you do not have a primary care provider, please call [phone] and they will assist you.        Care EveryWhere ID     This is your Care EveryWhere ID. This could be used by other organizations to access your [facility] medical records  [id number]        Your Vitals Were     Pulse Temperature Respirations Height Pulse Oximetry BMI (Body Mass Index)    96 97.7  F (36.5  C) (Oral) 18 5' 10.75\" (1.797 m) 98% 27.64 kg/m2       Blood Pressure from Last 3 Encounters:   04/25/18 124/81   12/06/17 117/79   08/03/17 (!) 138/91    Weight from Last 3 Encounters:   04/25/18 196 lb 12 oz (89.2 kg)   12/06/17 214 lb (97.1 kg)   08/03/17 217 lb (98.4 kg)              We Performed the Following     COMPREHENSIVE METABOLIC PANEL     DEPRESSION ACTION PLAN (DAP)     GASTROENTEROLOGY ADULT REF PROCEDURE ONLY [facility] [phone]     Lipid panel reflex to direct LDL Non-fasting     PSA, screen          Today's Medication Changes          These changes are accurate as of 4/25/18 12:33 PM.  If you have any questions, ask your nurse or doctor.               Stop taking these medicines if you haven't already. Please contact your care team if you have questions.     temazepam 15 MG capsule   Commonly known as:  RESTORIL   Stopped by:  Danielle Orlando MD                Where to get your medicines      Some of these will need a paper prescription and others can be bought over the counter.  Ask your nurse if you have questions.     Bring a paper prescription for each of these medications     pregabalin 150 MG capsule                Primary Care Provider Office Phone # Fax #    Danielle Orlando [phone] [address] [facility] [address]        Equal Access to " Services     [facility]: Hadii dayna canales sabramilena Makaylaali, waaxda luqadaha, qaybta kaalmada gabo, kyara cole . So [facility] [phone].    ATENCIÓN: Si lolisla elian, tiene a mcgowan disposición servicios gratuitos de asistencia lingüística. Llame al [phone].    We comply with applicable federal civil rights laws and [place] laws. We do not discriminate on the basis of race, color, national origin, age, disability, sex, sexual orientation, or gender identity.            Thank you!     Thank you for choosing [facility]  for your care. Our goal is always to provide you with excellent care. Hearing back from our patients is one way we can continue to improve our services. Please take a few minutes to complete the written survey that you may receive in the mail after your visit with us. Thank you!             Your Updated Medication List - Protect others around you: Learn how to safely use, store and throw away your medicines at www.disposemymeds.org.          This list is accurate as of 4/25/18 12:33 PM.  Always use your most recent med list.                   Brand Name Dispense Instructions for use Diagnosis    calcium 600 + D 600-400 MG-UNIT per tablet   Generic drug:  calcium-vitamin D      Take 1 tablet by mouth daily    Routine general medical examination at a health care facility       cetirizine 10 MG tablet    zyrTEC     Take 1 tablet (10 mg) by mouth 2 times daily        fish oil-omega-3 fatty acids 1000 MG capsule      1 tab daily        ibuprofen 200 MG capsule      2 capsules 3 - 4 times/day PRN        multivitamin per tablet     100    ONE DAILY        pregabalin 150 MG capsule    LYRICA    180 capsule    Take 1 capsule (150 mg) by mouth 2 times daily    Fibromyalgia       SAVELLA 100 MG Tabs tablet   Generic drug:  milnacipran     180 tablet    TAKE 1 TABLET(100 MG) BY MOUTH TWICE DAILY    Fibromyalgia, Chronic pain syndrome, Chronic fatigue syndrome,  Recurrent major depressive disorder, in full remission (H)       tiZANidine 4 MG tablet    ZANAFLEX     Take 2 tablets (8 mg) by mouth At Bedtime    Fibromyalgia, Chronic fatigue syndrome, Chronic pain syndrome       TYLENOL CAPS 500 MG OR      1 CAPSULE EVERY 4 HOURS AS NEEDED        VITAMIN D3 PO      Take 5,000 Units by mouth daily

## 2018-04-25 NOTE — PROGRESS NOTES
SUBJECTIVE:   CC: Margaret Izaguirre is an 53 year old male who presents for preventative health visit.     Physical   Annual:     Getting at least 3 servings of Calcium per day::  Yes    Bi-annual eye exam::  NO    Dental care twice a year::  Yes    Sleep apnea or symptoms of sleep apnea::  Sleep apnea    Diet::  Regular (no restrictions)    Frequency of exercise::  4-5 days/week    Duration of exercise::  15-30 minutes    Taking medications regularly::  Yes    Medication side effects::  None    Additional concerns today::  No          Fibromyalgia/CFS Follow-up  Has lost weight intentionally through diet and feels somewhat better.  He started Savella in December and he feels it has helped his pain.  Last night had a bad pain episode which occurs sometimes.  Energy is a bit better past couple months.   Starts to wake up around 6 am and gets up around 8:30 am.  Does some morning exercises - [employer] machine  Tries to avoid napping in the day.  Works around the house.  Takes care of his 5-year-old son after school.  Goes to bed around midnight and generally has no trouble falling asleep.  He does use tizanidine at HS to help with the nighttime pain.  Uses dental appliance for his CPAP.  He did get approved for social security/disability in Feb.  He still goes to the [facility].      Today's PHQ-2 Score:   PHQ-2 ( 1999 Pfizer) 4/22/2018   Q1: Little interest or pleasure in doing things 0   Q2: Feeling down, depressed or hopeless 0   PHQ-2 Score 0   Q1: Little interest or pleasure in doing things Not at all   Q2: Feeling down, depressed or hopeless Not at all   PHQ-2 Score 0       Abuse: Current or Past (Physical, Sexual or Emotional)- No  Do you feel safe in your environment - YES    Social History   Substance Use Topics     Smoking status: Never Smoker     Smokeless tobacco: Never Used     Alcohol use Yes      Comment: Very occasional use (once every few months)     Alcohol Use  4/22/2018   If you drink alcohol do you typically have greater than 3 drinks per day OR greater than 7 drinks per week? Not Applicable   No flowsheet data found.    Last PSA: No results found for: PSA    Reviewed orders with patient. Reviewed health maintenance and updated orders accordingly - Yes  BP Readings from Last 3 Encounters:   04/25/18 124/81   12/06/17 117/79   08/03/17 (!) 138/91    Wt Readings from Last 3 Encounters:   04/25/18 196 lb 12 oz (89.2 kg)   12/06/17 214 lb (97.1 kg)   08/03/17 217 lb (98.4 kg)            Reviewed and updated as needed this visit by clinical staff  Tobacco  Allergies  Meds  Problems  Med Hx  Surg Hx  Fam Hx  Soc Hx          Reviewed and updated as needed this visit by Provider  Meds  Problems        Past Medical History:   Diagnosis Date     Adenomatous polyp of colon     4/2015 - rpt in 3 yrs.     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 Gerber, [facility]     Obsessive-compulsive personality disorder      Rhabdomyolysis 12/10    [facility]      Past Surgical History:   Procedure Laterality Date     COLONOSCOPY  5/6/2005    WNL       Review of Systems  CONST: NEGATIVE for fevers/chills/sweats, unexplained weight loss/gain, and POSITIVE for fatigue  EYES: NEGATIVE for change in vision  ENT: NEGATIVE for difficulty hearing/tinnitus, and problems with teeth/gums  BREAST: NEGATIVE for breast lump/discharge  CV: NEGATIVE for chest pain/discomfort, leg pain with exercise, and palpitations  RESP: NEGATIVE for cough/wheeze, and difficulty breathing  GI: NEGATIVE for abdominal pain, blood in bowel  "movement, and nausea/vomiting/diarrhea  : NEGATIVE for nighttime urination, penile discharge, and concerns about sexual function and POSITIVE for post-void dribbling/double-voiding  MS: NEGATIVE for muscle/joint pain  SKIN: NEGATIVE for rash or mole change  NEURO: NEGATIVE for headache, memory loss, and loss of coordination and POSITIVE for dizziness/lightheadedness and numbness.  PSYCH: NEGATIVE for anxiety/stress, problems with sleep, and depression  HEME: NEGATIVE for unexplained lumps, and easy bruising/bleeding  ENDO: NEGATIVE for excessive thirst or urination  ALL: POSITIVE for hay fever/allergies     OBJECTIVE:   /81 (Patient Position: Sitting, Cuff Size: Adult Regular)  Pulse 96  Temp 97.7  F (36.5  C) (Oral)  Resp 18  Ht 5' 10.75\" (1.797 m)  Wt 196 lb 12 oz (89.2 kg)  SpO2 98%  BMI 27.64 kg/m2    Physical Exam  GENERAL: healthy, alert and no distress  EYES: Eyes grossly normal to inspection, PERRL and conjunctivae and sclerae normal  HENT: ear canals and TM's normal, nose and mouth without ulcers or lesions  NECK: no adenopathy, no asymmetry, masses, or scars and thyroid normal to palpation  RESP: lungs clear to auscultation - no rales, rhonchi or wheezes  CV: regular rate and rhythm, normal S1 S2, no S3 or S4, no murmur, click or rub, no peripheral edema and peripheral pulses strong  ABDOMEN: soft, nontender, no hepatosplenomegaly, no masses and bowel sounds normal  MS: no gross musculoskeletal defects noted, no edema  SKIN: no suspicious lesions or rashes  NEURO: Normal strength and tone, mentation intact and speech normal  PSYCH: mentation appears normal, affect normal/bright    ASSESSMENT/PLAN:     1. Routine general medical examination at a health care facility  - Lipid panel reflex to direct LDL Non-fasting    2. Screen for colon cancer  - GASTROENTEROLOGY ADULT REF PROCEDURE ONLY [facility] [phone]    3. Fibromyalgia  Stable/improved with Savella  - pregabalin (LYRICA) 150 " "MG capsule; Take 1 capsule (150 mg) by mouth 2 times daily  Dispense: 180 capsule; Refill: 3    4. Chronic fatigue syndrome  Stable    5. Screening PSA (prostate specific antigen)  Discussed pros/cons of screening PSA and he'd like to proceed  - PSA, screen    6. Medication monitoring encounter  - COMPREHENSIVE METABOLIC PANEL     COUNSELING:   Reviewed preventive health counseling, as reflected in patient instructions      BP Screening:   Last 3 BP Readings:    BP Readings from Last 3 Encounters:   04/25/18 124/81   12/06/17 117/79   08/03/17 (!) 138/91       The following was recommended to the patient:  Re-screen BP within a year and recommended lifestyle modifications      Wt Readings from Last 5 Encounters:   04/25/18 196 lb 12 oz (89.2 kg)   12/06/17 214 lb (97.1 kg)   08/03/17 217 lb (98.4 kg)   06/21/17 216 lb 8 oz (98.2 kg)   05/03/17 218 lb 4 oz (99 kg)    Estimated body mass index is 27.64 kg/(m^2) as calculated from the following:    Height as of this encounter: 5' 10.75\" (1.797 m).    Weight as of this encounter: 196 lb 12 oz (89.2 kg).   Weight management plan: Discussed healthy diet and exercise guidelines and patient will follow up in 12 months in clinic to re-evaluate. Congratulated him on successful weight loss.    Counseling Resources:  ATP IV Guidelines  Pooled Cohorts Equation Calculator  FRAX Risk Assessment  ICSI Preventive Guidelines  Dietary Guidelines for Americans, 2010  USDA's MyPlate  ASA Prophylaxis  Lung CA Screening    Danielle Orlando MD  [facility]      "

## 2018-04-26 LAB
ALBUMIN SERPL-MCNC: 4.4 G/DL (ref 3.4–5)
ALP SERPL-CCNC: 53 U/L (ref 40–150)
ALT SERPL W P-5'-P-CCNC: 38 U/L (ref 0–70)
ANION GAP SERPL CALCULATED.3IONS-SCNC: 6 MMOL/L (ref 3–14)
AST SERPL W P-5'-P-CCNC: 18 U/L (ref 0–45)
BILIRUB SERPL-MCNC: 0.4 MG/DL (ref 0.2–1.3)
BUN SERPL-MCNC: 14 MG/DL (ref 7–30)
CALCIUM SERPL-MCNC: 9.3 MG/DL (ref 8.5–10.1)
CHLORIDE SERPL-SCNC: 106 MMOL/L (ref 94–109)
CHOLEST SERPL-MCNC: 195 MG/DL
CO2 SERPL-SCNC: 28 MMOL/L (ref 20–32)
CREAT SERPL-MCNC: 0.76 MG/DL (ref 0.66–1.25)
GFR SERPL CREATININE-BSD FRML MDRD: >90 ML/MIN/1.7M2
GLUCOSE SERPL-MCNC: 98 MG/DL (ref 70–99)
HDLC SERPL-MCNC: 47 MG/DL
LDLC SERPL CALC-MCNC: 121 MG/DL
NONHDLC SERPL-MCNC: 148 MG/DL
POTASSIUM SERPL-SCNC: 4.3 MMOL/L (ref 3.4–5.3)
PROT SERPL-MCNC: 8.2 G/DL (ref 6.8–8.8)
PSA SERPL-ACNC: 0.87 UG/L (ref 0–4)
SODIUM SERPL-SCNC: 140 MMOL/L (ref 133–144)
TRIGL SERPL-MCNC: 135 MG/DL

## 2018-04-27 NOTE — PROGRESS NOTES
Tj Robles,  Your PSA (prostate cancer blood test) and comprehensive metabolic panel results (liver function, kidney function, blood sugar, and blood salts) are all excellent.  Your lipid panel (cholesterol) results are a bit higher than a few years ago  Keep working on eating a healthy diet and exercising as much as you can.     Danielle Orlando MD

## 2018-04-30 ENCOUNTER — TELEPHONE (OUTPATIENT)
Dept: FAMILY MEDICINE | Facility: CLINIC | Age: 53
End: 2018-04-30

## 2018-04-30 NOTE — TELEPHONE ENCOUNTER
Medical Report completed by Danielle Orlando , with instruction to make a copy for abstraction and mail in original to pt.    Copy put in abstraction.     Form mailed out.    Radha Calix MA

## 2018-05-09 ENCOUNTER — MYC MEDICAL ADVICE (OUTPATIENT)
Dept: FAMILY MEDICINE | Facility: CLINIC | Age: 53
End: 2018-05-09

## 2018-05-09 DIAGNOSIS — Z12.11 ENCOUNTER FOR SCREENING COLONOSCOPY: Primary | ICD-10-CM

## 2018-05-10 NOTE — TELEPHONE ENCOUNTER
LVM (non-detailed)asking patient to call clinic back at earliest convenience.     Message to be given: Nurse from GI clinic will call patient usually within a week of the procedure and go over the plan of how to administer Golytely and Danielle Sanchez MD all info and will also order for patient. Patient should call clinic of GI doc to speak with their nurse if they have not contacted the patient within 5-7 days prior to procedure.     Dr. Orlando did order Golytely but patient will have to contact GI nurse for instruction.     Thanks! Winnie Corea RN

## 2018-05-10 NOTE — TELEPHONE ENCOUNTER
"Pt states he received instructions from [facility] and  \"was not told how to source it.\"  Pt was told rxvwas sent to pharmacy and I he has questions he can all them for instructions for use. States he knows how to use it and has the printed directions from them    Colette Russell, RN, BSN       "

## 2018-05-10 NOTE — TELEPHONE ENCOUNTER
Usually the physician/clinic that is doing the procedure will prescribe this.  I don't ever prescribe it but I did send in a script.  However, he should clarify the instructions on how to take this with the colonoscopy center where he is having that done.

## 2018-05-16 ENCOUNTER — TELEPHONE (OUTPATIENT)
Dept: GASTROENTEROLOGY | Facility: OUTPATIENT CENTER | Age: 53
End: 2018-05-16

## 2018-05-18 ENCOUNTER — TELEPHONE (OUTPATIENT)
Dept: GASTROENTEROLOGY | Facility: OUTPATIENT CENTER | Age: 53
End: 2018-05-18

## 2018-05-18 NOTE — TELEPHONE ENCOUNTER
Patient taking any blood thinners ? Ibuprofen prn    Heart disease ? denies    Lung disease ? denies      Sleep apnea ? Dental appliance    Diabetic ?  denies    Kidney disease ? denies    Dialysis ? n/a    Electronic implanted medical devices ? denies    Are you taking any narcotic pain medication ?  no What is your daily dosage ?    PTSD ? n/a    Prep instructions reviewed with patient ? Patient declined review.  policy, MAC sedation plan reviewed. Advised patient to have someone stay with him post exam    Pharmacy :Patient has RX    Indication for procedure :Screen for colon cancer [Z12.11]    Referring provider :Danielle Orlando MD     Arrival Time : Patient will arrive at 8 AM

## 2018-05-23 ENCOUNTER — DOCUMENTATION ONLY (OUTPATIENT)
Dept: GASTROENTEROLOGY | Facility: OUTPATIENT CENTER | Age: 53
End: 2018-05-23
Payer: COMMERCIAL

## 2018-05-23 ENCOUNTER — TRANSFERRED RECORDS (OUTPATIENT)
Dept: HEALTH INFORMATION MANAGEMENT | Facility: CLINIC | Age: 53
End: 2018-05-23

## 2018-05-25 LAB — COPATH REPORT: NORMAL

## 2018-06-22 ENCOUNTER — TELEPHONE (OUTPATIENT)
Dept: GASTROENTEROLOGY | Facility: CLINIC | Age: 53
End: 2018-06-22

## 2018-06-22 NOTE — TELEPHONE ENCOUNTER
[facility] Call Center    Phone Message    May a detailed message be left on voicemail: yes    Reason for Call: Requesting Results   Name/type of test: Pathology report from colonoscopy  Date of test: 5/23 with Dr. Alexander  Was test done at a location other than [facility] (Please fill in the location if not [facility])?: No    Action Taken: Message routed to:  [facility] ([facility]

## 2018-06-25 ENCOUNTER — CARE COORDINATION (OUTPATIENT)
Dept: GASTROENTEROLOGY | Facility: CLINIC | Age: 53
End: 2018-06-25

## 2018-06-25 NOTE — PROGRESS NOTES
Returned patient call regarding pathology results.     Apologized as results were sent to primary care provider instead of proceduralist.     Hyperplastic polyp, benign finding. Recommended repeat colonoscopy in 5 years.     Left voicemail to discuss results. Provided clinic contact information.

## 2018-10-16 DIAGNOSIS — M79.7 FIBROMYALGIA: ICD-10-CM

## 2018-10-16 DIAGNOSIS — G89.4 CHRONIC PAIN SYNDROME: ICD-10-CM

## 2018-10-16 DIAGNOSIS — G93.32 CHRONIC FATIGUE SYNDROME: ICD-10-CM

## 2018-10-16 DIAGNOSIS — F33.42 RECURRENT MAJOR DEPRESSIVE DISORDER, IN FULL REMISSION (H): ICD-10-CM

## 2018-10-16 NOTE — TELEPHONE ENCOUNTER
SAVELLA 100MG TABLETS        Last Written Prescription Date:  4/11/2018  Last Fill Quantity: 180 tablet,   # refills: 1  Last Office Visit: 4/25/2018  Future Office visit:       Routing refill request to provider for review/approval because:  Drug not on the [facility], [facility] or [facility] refill protocol or controlled substance

## 2018-10-18 RX ORDER — MILNACIPRAN HYDROCHLORIDE 100 MG/1
TABLET, FILM COATED ORAL
Qty: 180 TABLET | Refills: 0 | Status: SHIPPED | OUTPATIENT
Start: 2018-10-18 | End: 2018-12-24

## 2018-12-23 NOTE — PROGRESS NOTES
SUBJECTIVE:  Margaret Izaguirre, a 53 year old male, is here to discuss the following issues:     Fibromyalgia, CFS, and Depression Follow-Up   Margaret has longstanding Fibromyalgia and Chronic Fatigue Syndrome.  I've been seeing him for this since July 2009 though the diagnoses were made in 2008 and 2007 respectively.    His clinical course had been one of intermittent exacerbations that required limited FMLA leaves from work until Feb 2015.  During the winter of 2014/2015 the exacerbations became worse in severity and duration and he has not been able to work at his [employer] job since Feb 2015.  He has been on long term disability leave from his employer since Feb 2015.  He also gets disability benefits through [employer] ([employer]  He has undergone an independent medical exam in 2015 with OT functional assessment (Physical Performance Test) for [employer].      He continues to take Savella, tizanidine, Lyrica to manage his symptoms.  He also uses ibuprofen and fish oil OTC.  He's been able to use ibuprofen sparingly; he can often go for over a week without using it.    He reports no no problems or side effects.   The course of his illness waxes and wanes and today he reports overall improvement of his episodes since the spring.  Jan - March 2018 were rough but the latter half of the year has been much better.  He's had less fatigue and pain and shorter flares.  Previously his flares were lengthening and lasting months but now they can be as short as a week.    He feels his sleep has been pretty good with use of the tizanidine.  He does awaken to urinate but generally is able to fall back to sleep relatively easily.  He's been exercising on his elliptical machine a couple times a week for 30 minutes.  [facility] but they've made no significant changes since he's been doing better.      He also has his annual disability insurance form with  him.    PHQ-9 SCORE 5/3/2017 12/6/2017 12/24/2018   PHQ-9 Total Score - - -   PHQ-9 Total Score MyChart - - -   PHQ-9 Total Score 5 5 3     [facility] Follow-up to PHQ 5/3/2017 12/6/2017 12/24/2018   PHQ-9 9. Suicide Ideation past 2 weeks Not at all Not at all Not at all     GUERRERO-7 SCORE 5/3/2017 12/6/2017 12/24/2018   Total Score 0 0 0          PHQ-9 SCORE 5/3/2017 12/6/2017 12/24/2018   PHQ-9 Total Score - - -   PHQ-9 Total Score MyChart - - -   PHQ-9 Total Score 5 5 3     [facility] Follow-up to PHQ 5/3/2017 12/6/2017 12/24/2018   PHQ-9 9. Suicide Ideation past 2 weeks Not at all Not at all Not at all     GUERRERO-7 SCORE 5/3/2017 12/6/2017 12/24/2018   Total Score 0 0 0          Problem list and histories reviewed & updated, as indicated.  Patient Active Problem List   Diagnosis     Chronic fatigue syndrome     Fibromyalgia     Allergic urticaria     Chronic pain syndrome     Memory loss     Major depression     Obstructive sleep apnea     Chronic nasal congestion     Delayed sleep phase syndrome     Adenomatous polyp of colon     Chronic urticaria       BP Readings from Last 3 Encounters:   12/24/18 129/85   04/25/18 124/81   12/06/17 117/79    Wt Readings from Last 3 Encounters:   12/24/18 78.9 kg (174 lb)   04/25/18 89.2 kg (196 lb 12 oz)   12/06/17 97.1 kg (214 lb)           ROS:  CONST: NEGATIVE for fevers, chills, and POSITIVE for fatigue  NEURO: NEGATIVE for headaches, dizziness  EYES: NEGATIVE for change in vision   ENT/M: NEGATIVE for change in hearing, dental problems   RESP: NEGATIVE for shortness of breath, cough   CV: NEGATIVE for chest pain, palpitations, peripheral edema  GI: NEGATIVE for nausea, abdominal pain, change in bowel habits  : POSITIVE for nocturia  INTEG/SKIN: NEGATIVE for rashes, new or changing moles  MS: POSITIVE for significant arthralgias or myalgias  ENDO: NEGATIVE for change in weight or appetite  PSYCH: NEGATIVE for change in mood     OBJECTIVE:    /85 (BP Location: Left arm, Patient  Position: Sitting, Cuff Size: Adult Regular)   Pulse 90   Temp 98.5  F (36.9  C) (Oral)   Resp 17   Wt 78.9 kg (174 lb)   SpO2 100%   BMI 24.44 kg/m    GENERAL: No apparent distress   HEAD: Normocephalic.  EYES:  No discharge or erythema.   NOSE: Normal without discharge.  LUNGS: Normal respiratory effort.  Clear to auscultation - no rales, rhonchi or wheezing.  HEART: Regular rhythm. Normal S1/S2. No murmurs.  SKIN: Clear. No significant rash, abnormal pigmentation or lesions  NEUROLOGIC: No focal findings. Cranial nerves grossly intact. Normal gait, strength and tone      ASSESSMENT/PLAN:  1. Fibromyalgia  2. Chronic pain syndrome  3. Chronic fatigue syndrome  Stable/improved control though this is still in range of his historical fluctuations.  He's done a nice job of using this time of improved function to exercise and lose weight and I congratulated him on that.    - milnacipran (SAVELLA) 100 MG TABS tablet; TAKE 1 TABLET(100 MG) BY MOUTH TWICE DAILY  Dispense: 180 tablet; Refill: 3    4. Recurrent major depressive disorder, in full remission (H)  Mood is stable/well controlled.  We'll continue Savella.    - milnacipran (SAVELLA) 100 MG TABS tablet; TAKE 1 TABLET(100 MG) BY MOUTH TWICE DAILY  Dispense: 180 tablet; Refill: 3    5. Need for vaccination for zoster  Discussed etiology and expected course of shingles.  Gave patient ed handout.  Discussed the importance of starting antiviral ASAP.  Reviewed risk of post-herpetic neuralgia and secondary cellulitis, advising patient to notify me if he develops signs or symptoms of either.    - ZOSTER VACCINE RECOMBINANT ADJUVANTED IM NJX  - ADMIN 1st VACCINE     Return to Clinic in 4-6 months for routine preventive physical with Shingrix booster.      Danielle Orlando MD   [facility]

## 2018-12-24 ENCOUNTER — OFFICE VISIT (OUTPATIENT)
Dept: FAMILY MEDICINE | Facility: CLINIC | Age: 53
End: 2018-12-24
Payer: COMMERCIAL

## 2018-12-24 ENCOUNTER — MYC MEDICAL ADVICE (OUTPATIENT)
Dept: FAMILY MEDICINE | Facility: CLINIC | Age: 53
End: 2018-12-24

## 2018-12-24 VITALS
SYSTOLIC BLOOD PRESSURE: 129 MMHG | TEMPERATURE: 98.5 F | BODY MASS INDEX: 24.44 KG/M2 | OXYGEN SATURATION: 100 % | DIASTOLIC BLOOD PRESSURE: 85 MMHG | RESPIRATION RATE: 17 BRPM | WEIGHT: 174 LBS | HEART RATE: 90 BPM

## 2018-12-24 DIAGNOSIS — M79.7 FIBROMYALGIA: Primary | ICD-10-CM

## 2018-12-24 DIAGNOSIS — Z23 NEED FOR VACCINATION FOR ZOSTER: ICD-10-CM

## 2018-12-24 DIAGNOSIS — F33.42 RECURRENT MAJOR DEPRESSIVE DISORDER, IN FULL REMISSION (H): ICD-10-CM

## 2018-12-24 DIAGNOSIS — G93.32 CHRONIC FATIGUE SYNDROME: ICD-10-CM

## 2018-12-24 DIAGNOSIS — G89.4 CHRONIC PAIN SYNDROME: ICD-10-CM

## 2018-12-24 PROCEDURE — 90750 HZV VACC RECOMBINANT IM: CPT | Performed by: FAMILY MEDICINE

## 2018-12-24 PROCEDURE — 90471 IMMUNIZATION ADMIN: CPT | Performed by: FAMILY MEDICINE

## 2018-12-24 PROCEDURE — 99214 OFFICE O/P EST MOD 30 MIN: CPT | Mod: 25 | Performed by: FAMILY MEDICINE

## 2018-12-24 ASSESSMENT — ANXIETY QUESTIONNAIRES
1. FEELING NERVOUS, ANXIOUS, OR ON EDGE: NOT AT ALL
5. BEING SO RESTLESS THAT IT IS HARD TO SIT STILL: NOT AT ALL
GAD7 TOTAL SCORE: 0
6. BECOMING EASILY ANNOYED OR IRRITABLE: NOT AT ALL
IF YOU CHECKED OFF ANY PROBLEMS ON THIS QUESTIONNAIRE, HOW DIFFICULT HAVE THESE PROBLEMS MADE IT FOR YOU TO DO YOUR WORK, TAKE CARE OF THINGS AT HOME, OR GET ALONG WITH OTHER PEOPLE: NOT DIFFICULT AT ALL
2. NOT BEING ABLE TO STOP OR CONTROL WORRYING: NOT AT ALL
7. FEELING AFRAID AS IF SOMETHING AWFUL MIGHT HAPPEN: NOT AT ALL
3. WORRYING TOO MUCH ABOUT DIFFERENT THINGS: NOT AT ALL

## 2018-12-24 ASSESSMENT — PATIENT HEALTH QUESTIONNAIRE - PHQ9
5. POOR APPETITE OR OVEREATING: NOT AT ALL
SUM OF ALL RESPONSES TO PHQ QUESTIONS 1-9: 3

## 2018-12-24 NOTE — LETTER
2018      Margaret Izaguirre   [facility] [id number]        To Whom It May Concern,     Margaret Izaguirre ( 1965) is under my professional care.  He was diagnosed with Fibromyalgia and Chronic Fatigue syndrome in  and has been on full disability for this for the past several years.  He has been unable to engage in any substantial gainful activity for the past several years with no anticipated resolution of his symptoms and ability to return to work.        Sincerely,        Danielle Orlando MD

## 2018-12-24 NOTE — NURSING NOTE
Screening Questionnaire for Adult Immunization     Are you sick today?   no    Do you have allergies to medications, food or any vaccine?   Yes    Have you ever had a serious reaction after receiving a vaccination?   No    Do you have a long-term health problem with heart disease, lung disease,  asthma, kidney disease, diabetes, anemia, metabolic or blood disease?   No    Do you have cancer, leukemia, AIDS, or any immune system problem?   No    Do you take cortisone, prednisone, other steroids, or anticancer drugs, or  have you had any x-ray (radiation) treatments?   No    Have you had a seizure, brain, or other nervous system problem?   No    During the past year, have you received a transfusion of blood or blood       products, or been given a medicine called immune (gamma) globulin?   No    For women: Are you pregnant or is there a chance you could become         pregnant during the next month?   No    Have you received any vaccinations in the past 4 weeks?   No     Immunization questionnaire was positive for at least one answer.  Notified Dr. Orlando.      MNVFC doesn't apply on this patient      Per orders of Dr. Orlando, injection of shingrix given by Ольга Valdovinos. Patient instructed to remain in clinic for 20 minutes afterwards, and to report any adverse reaction to me immediately.    Prior to injection verified patient identity using patient's name and date of birth.         Screening performed by Ольга Valdovinos, CMA

## 2018-12-25 ASSESSMENT — ANXIETY QUESTIONNAIRES: GAD7 TOTAL SCORE: 0

## 2019-05-14 NOTE — PROGRESS NOTES
SUBJECTIVE:  Margaret Izaguirre, a 54 year old male, is here to discuss the following issues:     Fibromyalgia, CFS, and Depression Follow-Up   Margaret has longstanding Fibromyalgia and Chronic Fatigue Syndrome.  I've been seeing him for this since July 2009 though the diagnoses were made in 2008 and 2007 respectively.    His clinical course had been one of intermittent exacerbations that required limited FMLA leaves from work until Feb 2015.  During the winter of 2014/2015 the exacerbations became worse in severity and duration and he has not been able to work at his [employer] job since Feb 2015.  He has been on long term disability leave from his employer since Feb 2015.  He also gets disability benefits through [employer] ([employer]  He has undergone an independent medical exam in 2015 with OT functional assessment (Physical Performance Test) for [employer].       He continues to take Savella, tizanidine, Lyrica to manage his symptoms.  He also uses ibuprofen and fish oil OTC.    The course of his illness waxes and wanes and today he reports feeling he's in an exacerbation for the past 8 weeks - both with increased fibromyalgia pain and fatigue.  He also notes some dizziness.  He's been taking the ibuprofen at 600 mg BID for most days in the past couple weeks.  In the past he's been able to use ibuprofen sparingly; when he's doing well he can go for over a week without using it.  He has noted some epigastric distress lately and he's been using Maalox with the ibuprofen.    Jan - March 2018 were also rough for him but the latter half of the year was better.    He feels his sleep has been poor lately due to pain.  Pain awakens him around 3 am.    He's been exercising on his elliptical machine several times a week for 15-30 minutes.  [facility].       He also has his annual [employer] form with him.    PHQ-9 SCORE 12/6/2017 12/24/2018 5/15/2019   PHQ-9  "Total Score - - -   PHQ-9 Total Score MyChart - - 8 (Mild depression)   PHQ-9 Total Score 5 3 8     GUERRERO-7 SCORE 5/3/2017 12/6/2017 12/24/2018   Total Score 0 0 0     PHQ-9 ([employer]) 5/15/2019   1.  Little interest or pleasure in doing things 1   2.  Feeling down, depressed, or hopeless 0   3.  Trouble falling or staying asleep, or sleeping too much 1   4.  Feeling tired or having little energy 3   5.  Poor appetite or overeating 1   6.  Feeling bad about yourself 0   7.  Trouble concentrating 2   8.  Moving slowly or restless 0   9.  Suicidal or self-harm thoughts 0   PHQ-9 Total Score 8       Encounter-Level CSA:    There are no encounter-level csa.     Patient-Level CSA:    There are no patient-level csa.            Problem list and histories reviewed & updated, as indicated.  Patient Active Problem List   Diagnosis     Chronic fatigue syndrome     Fibromyalgia     Allergic urticaria     Chronic pain syndrome     Memory loss     Major depression     Obstructive sleep apnea     Chronic nasal congestion     Delayed sleep phase syndrome     Adenomatous polyp of colon     Chronic urticaria       BP Readings from Last 3 Encounters:   05/15/19 128/85   12/24/18 129/85   04/25/18 124/81    Wt Readings from Last 3 Encounters:   05/15/19 81.2 kg (179 lb)   12/24/18 78.9 kg (174 lb)   04/25/18 89.2 kg (196 lb 12 oz)             ROS:  12 point ROS of systems including Constitutional, Eyes, ENT, Respiratory, Cardiovascular, Gastroenterology, Genitourinary, Integumentary, Musculoskeletal, Neurologic, Psychiatric, and Hematologic/Lymphatic were all negative except for pertinent positives noted in my HPI.     OBJECTIVE:    /85 (BP Location: Right arm, Patient Position: Sitting, Cuff Size: Adult Regular)   Pulse 97   Temp 98.3  F (36.8  C) (Oral)   Resp 16   Ht 1.791 m (5' 10.5\")   Wt 81.2 kg (179 lb)   SpO2 96%   BMI 25.32 kg/m    GEN:  no apparent distress  EYES: sclerae and conjunctivae clear with no " discharge  NECK:  Supple without adenopathy, mass, or thyromegaly  LUNGS:  normal respiratory effort, and lungs clear to auscultation bilaterally - no rales, rhonchi or wheezes  CV: regular rate and rhythm, normal S1 S2, no S3 or S4 and no murmur, click or rub  PSYCH:  Appearance/Behavior: patient is appropriately and casually dressed.  Speech:  normal  rate, rhythm, and tone.  Mood/Affect: Bright/congruent.  Insight: good     ASSESSMENT/PLAN:  1. Chronic fatigue syndrome  2. Fibromyalgia  Currently in a flare.  Discussed that he's on the maximum dose of Savella so he'll continue that at 100 mg BID.  I am concerned about his ibuprofen use and GI upset he's been having.  He'll notify me if that worsens - we'll check CMP to monitor medications and TSH to rule out co-morbid hypothyroidism as potential etiology of the worsening fatigue.  I will renew his [employer] forms.  - COMPREHENSIVE METABOLIC PANEL  - TSH with free T4 reflex    3. Need for prophylactic vaccination with tetanus-diphtheria (Td)  - TDAP, IM (10 - 64 YRS) - Adacel  - ADMIN 1st VACCINE     Return to Clinic in 6 months.      Danielle Orlando MD   [facility]

## 2019-05-15 ENCOUNTER — OFFICE VISIT (OUTPATIENT)
Dept: FAMILY MEDICINE | Facility: CLINIC | Age: 54
End: 2019-05-15
Payer: COMMERCIAL

## 2019-05-15 VITALS
TEMPERATURE: 98.3 F | HEIGHT: 71 IN | BODY MASS INDEX: 25.06 KG/M2 | DIASTOLIC BLOOD PRESSURE: 85 MMHG | WEIGHT: 179 LBS | OXYGEN SATURATION: 96 % | SYSTOLIC BLOOD PRESSURE: 128 MMHG | RESPIRATION RATE: 16 BRPM | HEART RATE: 97 BPM

## 2019-05-15 DIAGNOSIS — M79.7 FIBROMYALGIA: ICD-10-CM

## 2019-05-15 DIAGNOSIS — G93.32 CHRONIC FATIGUE SYNDROME: Primary | ICD-10-CM

## 2019-05-15 DIAGNOSIS — Z23 NEED FOR PROPHYLACTIC VACCINATION WITH TETANUS-DIPHTHERIA (TD): ICD-10-CM

## 2019-05-15 PROCEDURE — 90471 IMMUNIZATION ADMIN: CPT | Performed by: FAMILY MEDICINE

## 2019-05-15 PROCEDURE — 84443 ASSAY THYROID STIM HORMONE: CPT | Performed by: FAMILY MEDICINE

## 2019-05-15 PROCEDURE — 80053 COMPREHEN METABOLIC PANEL: CPT | Performed by: FAMILY MEDICINE

## 2019-05-15 PROCEDURE — 90715 TDAP VACCINE 7 YRS/> IM: CPT | Performed by: FAMILY MEDICINE

## 2019-05-15 PROCEDURE — 99214 OFFICE O/P EST MOD 30 MIN: CPT | Mod: 25 | Performed by: FAMILY MEDICINE

## 2019-05-15 PROCEDURE — 36415 COLL VENOUS BLD VENIPUNCTURE: CPT | Performed by: FAMILY MEDICINE

## 2019-05-15 ASSESSMENT — PATIENT HEALTH QUESTIONNAIRE - PHQ9
SUM OF ALL RESPONSES TO PHQ QUESTIONS 1-9: 8
10. IF YOU CHECKED OFF ANY PROBLEMS, HOW DIFFICULT HAVE THESE PROBLEMS MADE IT FOR YOU TO DO YOUR WORK, TAKE CARE OF THINGS AT HOME, OR GET ALONG WITH OTHER PEOPLE: SOMEWHAT DIFFICULT
SUM OF ALL RESPONSES TO PHQ QUESTIONS 1-9: 8

## 2019-05-15 ASSESSMENT — MIFFLIN-ST. JEOR: SCORE: 1656.13

## 2019-05-15 NOTE — NURSING NOTE
Screening Questionnaire for Adult Immunization    Are you sick today?   No   Do you have allergies to medications, food, a vaccine component or latex?   Yes   Have you ever had a serious reaction after receiving a vaccination?   No   Do you have a long-term health problem with heart disease, lung disease, asthma, kidney disease, metabolic disease (e.g. diabetes), anemia, or other blood disorder?   No   Do you have cancer, leukemia, HIV/AIDS, or any other immune system problem?   No   In the past 3 months, have you taken medications that affect  your immune system, such as prednisone, other steroids, or anticancer drugs; drugs for the treatment of rheumatoid arthritis, Crohn s disease, or psoriasis; or have you had radiation treatments?   No   Have you had a seizure, or a brain or other nervous system problem?   No   During the past year, have you received a transfusion of blood or blood     products, or been given immune (gamma) globulin or antiviral drug?   No   For women: Are you pregnant or is there a chance you could become        pregnant during the next month?   No   Have you received any vaccinations in the past 4 weeks?   No     Immunization questionnaire was positive for at least one answer.  Notified Darion, permitted to continue.        Per orders of Dr. Orlando, injection of TDAP given by Jennifer Owens. Patient instructed to remain in clinic for 15 minutes afterwards, and to report any adverse reaction to me immediately.       Screening performed by Jennifer Owens on 5/15/2019 at 12:47 PM.    Prior to injection, verified patient identity using patient's name and date of birth.  Due to injection administration, patient instructed to remain in clinic for 15 minutes  afterwards, and to report any adverse reaction to me immediately.    TDAP    Drug Amount Wasted:  None.  Vial/Syringe: Syringe    The following medication was given:     MEDICATION: TDAP  ROUTE: IM  SITE: Deltoid - Left  DOSE: 0.5mL  LOT #:  [id number]  :  [employer]  EXPIRATION DATE:  04/10/2021  NDC#: 43313-274-57     Jennifer Owens MA on 5/15/2019 at 12:47 PM

## 2019-05-16 ASSESSMENT — PATIENT HEALTH QUESTIONNAIRE - PHQ9: SUM OF ALL RESPONSES TO PHQ QUESTIONS 1-9: 8

## 2019-05-17 LAB
ALBUMIN SERPL-MCNC: 4.7 G/DL (ref 3.4–5)
ALP SERPL-CCNC: 54 U/L (ref 40–150)
ALT SERPL W P-5'-P-CCNC: 30 U/L (ref 0–70)
ANION GAP SERPL CALCULATED.3IONS-SCNC: 13 MMOL/L (ref 3–14)
AST SERPL W P-5'-P-CCNC: 12 U/L (ref 0–45)
BILIRUB SERPL-MCNC: 0.7 MG/DL (ref 0.2–1.3)
BUN SERPL-MCNC: 12 MG/DL (ref 7–30)
CALCIUM SERPL-MCNC: 9.9 MG/DL (ref 8.5–10.1)
CHLORIDE SERPL-SCNC: 101 MMOL/L (ref 94–109)
CO2 SERPL-SCNC: 25 MMOL/L (ref 20–32)
CREAT SERPL-MCNC: 0.84 MG/DL (ref 0.66–1.25)
GFR SERPL CREATININE-BSD FRML MDRD: >90 ML/MIN/{1.73_M2}
GLUCOSE SERPL-MCNC: 79 MG/DL (ref 70–99)
POTASSIUM SERPL-SCNC: 4.1 MMOL/L (ref 3.4–5.3)
PROT SERPL-MCNC: 8 G/DL (ref 6.8–8.8)
SODIUM SERPL-SCNC: 139 MMOL/L (ref 133–144)
TSH SERPL DL<=0.005 MIU/L-ACNC: 1.33 MU/L (ref 0.4–4)

## 2019-05-26 DIAGNOSIS — M79.7 FIBROMYALGIA: ICD-10-CM

## 2019-05-28 RX ORDER — PREGABALIN 150 MG/1
CAPSULE ORAL
Qty: 180 CAPSULE | Refills: 1 | Status: SHIPPED | OUTPATIENT
Start: 2019-05-28 | End: 2019-11-28

## 2019-05-28 NOTE — TELEPHONE ENCOUNTER
Requested Prescriptions   Pending Prescriptions Disp Refills     LYRICA 150 MG capsule [Pharmacy Med Name: LYRICA 150MG CAPSULES] 180 capsule 0     Sig: TAKE 1 CAPSULE BY MOUTH TWICE DAILY       There is no refill protocol information for this order              Last Written Prescription Date:  4/25/18  Last Fill Quantity: 180,   # refills: 3  Last Office Visit: 5/15/19  Future Office visit:       Routing refill request to provider for review/approval because:  Drug not on the [facility] or [facility] refill protocol or controlled substance

## 2019-09-19 ENCOUNTER — TRANSFERRED RECORDS (OUTPATIENT)
Dept: HEALTH INFORMATION MANAGEMENT | Facility: CLINIC | Age: 54
End: 2019-09-19

## 2019-10-04 ENCOUNTER — HEALTH MAINTENANCE LETTER (OUTPATIENT)
Age: 54
End: 2019-10-04

## 2019-11-28 DIAGNOSIS — M79.7 FIBROMYALGIA: ICD-10-CM

## 2019-11-29 NOTE — TELEPHONE ENCOUNTER
Requested Prescriptions   Pending Prescriptions Disp Refills     pregabalin (LYRICA) 150 MG capsule [Pharmacy Med Name: PREGABALIN 150MG CAPSULES] 180 capsule 0     Sig: TAKE 1 CAPSULE BY MOUTH TWICE DAILY         Last Written Prescription Date:  5/28/2019  Last Fill Quantity: 180 caps,   # refills: 1  Last Office Visit: 5/15/2019  Future Office visit:       Routing refill request to provider for review/approval because:  Drug not on the [facility], [facility] or  [facility] refill protocol or controlled substance

## 2019-12-01 RX ORDER — PREGABALIN 150 MG/1
CAPSULE ORAL
Qty: 180 CAPSULE | Refills: 0 | Status: SHIPPED | OUTPATIENT
Start: 2019-12-01 | End: 2020-01-23

## 2019-12-09 NOTE — PROGRESS NOTES
Subjective     Margaret Izaguirre is a 54 year old male who presents to clinic today for the following health issues:    HPI   Gastrointestinal symptoms      Duration: x1.5 months    Description:           REFLUX SYMPTOMS - heartburn, belching, nausea and indigestion      Accompanying signs and symptoms:  diarrhea, bloating    History  Previous similar problem: no   Previous evaluation:  none    Aggravating factors: fatty meals    Alleviating factors: nothing    Other Therapies tried: eat light meals, increase fluid intake, plain/bland diet - all which did not help    Indigestion/heartburn - happens occasionally but this time more persistent  Fullness/heaviness of epigastrium - can last an hour  Nausea without vomiting - nausea always occurred at night in bed  No fevers  No weight loss  No dysphagia  Looser stools intermittently  No exertional symptoms.        BP Readings from Last 3 Encounters:   12/11/19 124/88   05/15/19 128/85   12/24/18 129/85    Wt Readings from Last 3 Encounters:   12/11/19 80.3 kg (177 lb)   05/15/19 81.2 kg (179 lb)   12/24/18 78.9 kg (174 lb)        Reviewed and updated as needed this visit by Provider  Meds  Problems         Review of Systems   ROS COMP: Constitutional, HEENT, cardiovascular, pulmonary, gi and gu systems are negative, except as otherwise noted.      Objective    /88 (BP Location: Right arm, Patient Position: Sitting, Cuff Size: Adult Regular)   Pulse 100   Temp 97.9  F (36.6  C) (Tympanic)   Resp 14   Wt 80.3 kg (177 lb)   SpO2 97%   BMI 25.04 kg/m    Body mass index is 25.04 kg/m .  Physical Exam   GENERAL APPEARANCE: healthy, alert and no distress   EYES: Eyes grossly normal to inspection, conjunctivae and sclerae normal  ENT: external ears and nose without lesions or scars and oropharynx clear with moist mucus membranes and normal landmarks  NECK: no adenopathy, no asymmetry, masses, or scars and thyroid normal to palpation  RESP: normal respiratory effort and  lungs clear to auscultation - no rales, rhonchi or wheezes  CV: regular rate and rhythm, normal S1 S2, no S3 or S4 and no murmur, click or rub   ABD:  soft, nontender, no mass, no hepatosplenomegaly, no hernias   SKIN: Clear. No significant rash, abnormal pigmentation or lesions     Diagnostic Test Results:  Labs reviewed in Epic        Assessment & Plan       ICD-10-CM    1. Epigastric fullness R19.06 Helicobacter pylori Antigen Stool     CBC with platelets differential     Comprehensive metabolic panel     omeprazole (PRILOSEC) 20 MG DR capsule   2. Gastroesophageal reflux disease, esophagitis presence not specified K21.9 Helicobacter pylori Antigen Stool     CBC with platelets differential     Comprehensive metabolic panel     omeprazole (PRILOSEC) 20 MG DR capsule     Ddx considered includes: dyspepsia, PUD, GERD, gastritis, H pylori, pancreatitis, GB disease, hepatic disease, gastric/pancreatic or other cancer, intestinal ischemia.  Most likely this is a bad episode of GERD but we will do labs to rule out other etiologies of concern and he will do a trial of daily PPI therapy.       Return in about 3 weeks (around 1/1/2020).    Danielle Orlando MD  [facility]

## 2019-12-11 ENCOUNTER — OFFICE VISIT (OUTPATIENT)
Dept: FAMILY MEDICINE | Facility: CLINIC | Age: 54
End: 2019-12-11
Payer: COMMERCIAL

## 2019-12-11 VITALS
SYSTOLIC BLOOD PRESSURE: 124 MMHG | RESPIRATION RATE: 14 BRPM | DIASTOLIC BLOOD PRESSURE: 88 MMHG | WEIGHT: 177 LBS | HEART RATE: 100 BPM | BODY MASS INDEX: 25.04 KG/M2 | OXYGEN SATURATION: 97 % | TEMPERATURE: 97.9 F

## 2019-12-11 DIAGNOSIS — R19.06 EPIGASTRIC FULLNESS: Primary | ICD-10-CM

## 2019-12-11 DIAGNOSIS — K21.9 GASTROESOPHAGEAL REFLUX DISEASE, ESOPHAGITIS PRESENCE NOT SPECIFIED: ICD-10-CM

## 2019-12-11 LAB
BASOPHILS # BLD AUTO: 0 10E9/L (ref 0–0.2)
BASOPHILS NFR BLD AUTO: 0.5 %
DIFFERENTIAL METHOD BLD: NORMAL
EOSINOPHIL # BLD AUTO: 0.3 10E9/L (ref 0–0.7)
EOSINOPHIL NFR BLD AUTO: 5.6 %
ERYTHROCYTE [DISTWIDTH] IN BLOOD BY AUTOMATED COUNT: 13.2 % (ref 10–15)
HCT VFR BLD AUTO: 42.7 % (ref 40–53)
HGB BLD-MCNC: 13.9 G/DL (ref 13.3–17.7)
LYMPHOCYTES # BLD AUTO: 1.8 10E9/L (ref 0.8–5.3)
LYMPHOCYTES NFR BLD AUTO: 30.7 %
MCH RBC QN AUTO: 29.4 PG (ref 26.5–33)
MCHC RBC AUTO-ENTMCNC: 32.6 G/DL (ref 31.5–36.5)
MCV RBC AUTO: 90 FL (ref 78–100)
MONOCYTES # BLD AUTO: 0.5 10E9/L (ref 0–1.3)
MONOCYTES NFR BLD AUTO: 7.8 %
NEUTROPHILS # BLD AUTO: 3.3 10E9/L (ref 1.6–8.3)
NEUTROPHILS NFR BLD AUTO: 55.4 %
PLATELET # BLD AUTO: 312 10E9/L (ref 150–450)
RBC # BLD AUTO: 4.73 10E12/L (ref 4.4–5.9)
WBC # BLD AUTO: 5.9 10E9/L (ref 4–11)

## 2019-12-11 PROCEDURE — 80053 COMPREHEN METABOLIC PANEL: CPT | Performed by: FAMILY MEDICINE

## 2019-12-11 PROCEDURE — 99214 OFFICE O/P EST MOD 30 MIN: CPT | Performed by: FAMILY MEDICINE

## 2019-12-11 PROCEDURE — 36415 COLL VENOUS BLD VENIPUNCTURE: CPT | Performed by: FAMILY MEDICINE

## 2019-12-11 PROCEDURE — 85025 COMPLETE CBC W/AUTO DIFF WBC: CPT | Performed by: FAMILY MEDICINE

## 2019-12-11 ASSESSMENT — ANXIETY QUESTIONNAIRES
3. WORRYING TOO MUCH ABOUT DIFFERENT THINGS: NOT AT ALL
7. FEELING AFRAID AS IF SOMETHING AWFUL MIGHT HAPPEN: NOT AT ALL
2. NOT BEING ABLE TO STOP OR CONTROL WORRYING: NOT AT ALL
6. BECOMING EASILY ANNOYED OR IRRITABLE: NOT AT ALL
5. BEING SO RESTLESS THAT IT IS HARD TO SIT STILL: NOT AT ALL
1. FEELING NERVOUS, ANXIOUS, OR ON EDGE: NOT AT ALL
GAD7 TOTAL SCORE: 0
4. TROUBLE RELAXING: NOT AT ALL
7. FEELING AFRAID AS IF SOMETHING AWFUL MIGHT HAPPEN: NOT AT ALL

## 2019-12-11 ASSESSMENT — PATIENT HEALTH QUESTIONNAIRE - PHQ9
SUM OF ALL RESPONSES TO PHQ QUESTIONS 1-9: 4
10. IF YOU CHECKED OFF ANY PROBLEMS, HOW DIFFICULT HAVE THESE PROBLEMS MADE IT FOR YOU TO DO YOUR WORK, TAKE CARE OF THINGS AT HOME, OR GET ALONG WITH OTHER PEOPLE: SOMEWHAT DIFFICULT
SUM OF ALL RESPONSES TO PHQ QUESTIONS 1-9: 4

## 2019-12-12 DIAGNOSIS — K21.9 GASTROESOPHAGEAL REFLUX DISEASE, ESOPHAGITIS PRESENCE NOT SPECIFIED: ICD-10-CM

## 2019-12-12 DIAGNOSIS — R19.06 EPIGASTRIC FULLNESS: ICD-10-CM

## 2019-12-12 LAB
ALBUMIN SERPL-MCNC: 4 G/DL (ref 3.4–5)
ALP SERPL-CCNC: 54 U/L (ref 40–150)
ALT SERPL W P-5'-P-CCNC: 26 U/L (ref 0–70)
ANION GAP SERPL CALCULATED.3IONS-SCNC: 5 MMOL/L (ref 3–14)
AST SERPL W P-5'-P-CCNC: 16 U/L (ref 0–45)
BILIRUB SERPL-MCNC: 0.4 MG/DL (ref 0.2–1.3)
BUN SERPL-MCNC: 21 MG/DL (ref 7–30)
CALCIUM SERPL-MCNC: 8.9 MG/DL (ref 8.5–10.1)
CHLORIDE SERPL-SCNC: 104 MMOL/L (ref 94–109)
CO2 SERPL-SCNC: 27 MMOL/L (ref 20–32)
CREAT SERPL-MCNC: 0.8 MG/DL (ref 0.66–1.25)
GFR SERPL CREATININE-BSD FRML MDRD: >90 ML/MIN/{1.73_M2}
GLUCOSE SERPL-MCNC: 97 MG/DL (ref 70–99)
POTASSIUM SERPL-SCNC: 4.1 MMOL/L (ref 3.4–5.3)
PROT SERPL-MCNC: 7.8 G/DL (ref 6.8–8.8)
SODIUM SERPL-SCNC: 136 MMOL/L (ref 133–144)

## 2019-12-12 PROCEDURE — 87338 HPYLORI STOOL AG IA: CPT | Performed by: FAMILY MEDICINE

## 2019-12-12 ASSESSMENT — ANXIETY QUESTIONNAIRES: GAD7 TOTAL SCORE: 0

## 2019-12-12 ASSESSMENT — PATIENT HEALTH QUESTIONNAIRE - PHQ9: SUM OF ALL RESPONSES TO PHQ QUESTIONS 1-9: 4

## 2019-12-12 NOTE — RESULT ENCOUNTER NOTE
Tj Robles,  This all looks good.  I'll be in touch again when I receive the H pylori result.    Danielle Orlando MD

## 2019-12-13 LAB — H PYLORI AG STL QL IA: NEGATIVE

## 2019-12-13 NOTE — RESULT ENCOUNTER NOTE
Tj Robles,  Your H pylori test is negative.  Continue the omeprazole and let's see if that settles your symptoms down.    Danielle Orlando MD

## 2019-12-23 NOTE — PROGRESS NOTES
Subjective     Margaret Izaguirre is a 54 year old male who presents to clinic today for the following health issues:        HPI     Follow up GI issues   x 2 months  Medication (Omeprazole) is not particularly effective  Sx still present are: nausea, heartburn, fullness of stomach and stomach discomfort   Worse after eating but can occur on an empty stomach  Not as much belching  Just feels like the food sits in his stomach.  No vomiting.  Not worsened by exertion.  Some dizziness which he feels is related to nausea.  No shortness of breath, palpitations.  No fevers.  W/U earlier this month included negative H pylori, CBC, CMP.      BP Readings from Last 3 Encounters:   12/30/19 122/86   12/11/19 124/88   05/15/19 128/85    Wt Readings from Last 3 Encounters:   12/30/19 82.6 kg (182 lb)   12/11/19 80.3 kg (177 lb)   05/15/19 81.2 kg (179 lb)           Reviewed and updated as needed this visit by Provider  Meds  Problems         Review of Systems   ROS COMP: Constitutional, cardiovascular, pulmonary, gi systems are negative, except as otherwise noted.      Objective    /86 (BP Location: Left arm, Patient Position: Sitting, Cuff Size: Adult Regular)   Pulse 92   Temp 97.7  F (36.5  C) (Oral)   Resp 14   Wt 82.6 kg (182 lb)   SpO2 100%   BMI 25.75 kg/m    Body mass index is 25.75 kg/m .  Physical Exam   GEN:  no apparent distress  LUNGS:  normal respiratory effort, and lungs clear to auscultation bilaterally - no rales, rhonchi or wheezes  CV: regular rate and rhythm, normal S1 S2, no S3 or S4 and no murmur, click or rub  ABD:  soft, mild epigastric tenderness to palpation, no mass, no hepatosplenomegaly, no hernias  SKIN: Clear. No significant rash, abnormal pigmentation or lesions     Diagnostic Test Results:  Labs reviewed in Epic        Assessment & Plan       ICD-10-CM    1. Epigastric fullness R19.06 GASTROENTEROLOGY ADULT REF CONSULT ONLY   2. Gastroesophageal reflux disease, esophagitis presence not  specified K21.9 GASTROENTEROLOGY ADULT REF CONSULT ONLY   3. Nausea R11.0 GASTROENTEROLOGY ADULT REF CONSULT ONLY     Ongoing symptoms with lack of response to PPI trial.  I recommended he see GI for further eval.  He will complete the final week of PPI and discontinue it at that time if still no improvement of symptoms.      No follow-ups on file.    Danielle Orlando MD  [facility] [place]

## 2019-12-30 ENCOUNTER — OFFICE VISIT (OUTPATIENT)
Dept: FAMILY MEDICINE | Facility: CLINIC | Age: 54
End: 2019-12-30
Payer: COMMERCIAL

## 2019-12-30 VITALS
BODY MASS INDEX: 25.75 KG/M2 | HEART RATE: 92 BPM | RESPIRATION RATE: 14 BRPM | WEIGHT: 182 LBS | OXYGEN SATURATION: 100 % | TEMPERATURE: 97.7 F | DIASTOLIC BLOOD PRESSURE: 86 MMHG | SYSTOLIC BLOOD PRESSURE: 122 MMHG

## 2019-12-30 DIAGNOSIS — K21.9 GASTROESOPHAGEAL REFLUX DISEASE, ESOPHAGITIS PRESENCE NOT SPECIFIED: ICD-10-CM

## 2019-12-30 DIAGNOSIS — R19.06 EPIGASTRIC FULLNESS: Primary | ICD-10-CM

## 2019-12-30 DIAGNOSIS — R11.0 NAUSEA: ICD-10-CM

## 2019-12-30 PROCEDURE — 99213 OFFICE O/P EST LOW 20 MIN: CPT | Performed by: FAMILY MEDICINE

## 2020-01-21 ENCOUNTER — TRANSFERRED RECORDS (OUTPATIENT)
Dept: HEALTH INFORMATION MANAGEMENT | Facility: CLINIC | Age: 55
End: 2020-01-21

## 2020-01-21 NOTE — PROGRESS NOTES
Subjective     Margaret Izaguirre is a 55 year old male who presents to clinic today for the following health issues:    HPI   Fibromyalgia, CFS, and Depression Follow-Up   Margaret has longstanding Fibromyalgia and Chronic Fatigue Syndrome.  I've been seeing him for this since July 2009 though the diagnoses were made in 2008 and 2007 respectively.    His clinical course had been one of intermittent exacerbations that required limited FMLA leaves from work until Feb 2015.  During the winter of 2014/2015 the exacerbations became worse in severity and duration and he has not been able to work at his [employer] job since Feb 2015.  He has been on long term disability leave from his employer since Feb 2015.  He also gets disability benefits through [employer]  He has undergone an independent medical exam in 2015 with OT functional assessment (Physical Performance Test) for [employer].       He continues to take Savella, tizanidine, Lyrica to manage his symptoms.  He found Savella much more effective than the Cymbalta he had been on previously.    He also uses ibuprofen and fish oil OTC.    The course of his illness waxes and wanes and last spring he suffered an extended flare both with increased fibromyalgia pain and fatigue as well as some dizziness.  Jan - March 2018 were also rough for him but the latter half of the year was better.      Today he reports shorter flares of pain and fatigue (lasting days).  He estimates he is completely nonfunctional for a few days every couple of weeks.  He's been struggling with GI symptoms (epigastric fullness) since early November and feels this has triggered some of his fibromyalgia and CFS symptoms.  I referred him to GI and he recently saw Dr. Masters at [facility] and is scheduled for EGD on 1/30.  Dr. Masters has advised he try to cut back on the ibuprofen and is doing a short trial of high-dose PPI (protonix 40 BID).    He's also had more low back pain.   This is midline and bilateral with no radiation and no numbness, tingling, weakness of the legs.  He is scheduled to travel to [facility] in mid-February to see his mother and will be there for 2 weeks.      He feels his sleep has been poor lately, mostly due to the back pain.    He has not been exercising at all although he has an elliptical machine at home.  [facility]      He also has his annual Standard Disability form with him.      PHQ-9 SCORE 5/15/2019 12/11/2019 1/23/2020   PHQ-9 Total Score - - -   PHQ-9 Total Score MyChart 8 (Mild depression) 4 (Minimal depression) 4 (Minimal depression)   PHQ-9 Total Score 8 4 4     GUERRERO-7 SCORE 12/24/2018 12/11/2019 1/23/2020   Total Score - 0 (minimal anxiety) 0 (minimal anxiety)   Total Score 0 0 0     Encounter-Level CSA:    There are no encounter-level csa.     Patient-Level CSA:    There are no patient-level csa.         How many servings of fruits and vegetables do you eat daily?  4 or more    On average, how many sweetened beverages do you drink each day (Examples: soda, juice, sweet tea, etc.  Do NOT count diet or artificially sweetened beverages)?   0    How many days per week do you exercise enough to make your heart beat faster? 5    How many minutes a day do you exercise enough to make your heart beat faster? 30 - 60    How many days per week do you miss taking your medication? 0        BP Readings from Last 3 Encounters:   01/23/20 120/76   12/30/19 122/86   12/11/19 124/88    Wt Readings from Last 3 Encounters:   01/23/20 81.1 kg (178 lb 12 oz)   12/30/19 82.6 kg (182 lb)   12/11/19 80.3 kg (177 lb)           Reviewed and updated as needed this visit by Provider  Meds  Problems         Review of Systems   ROS COMP: Constitutional, neuro, gi and gu systems are negative, except as otherwise noted.      Objective    /76 (BP Location: Left arm, Patient Position: Sitting, Cuff Size: Adult Large)   Pulse  "104   Temp 98.4  F (36.9  C) (Oral)   Ht 1.791 m (5' 10.5\")   Wt 81.1 kg (178 lb 12 oz)   SpO2 95%   BMI 25.29 kg/m    Body mass index is 25.29 kg/m .  Physical Exam   GENERAL: No apparent distress   HEAD: Normocephalic.  EYES:  No discharge or erythema.   NOSE: Normal without discharge.  LUNGS: Normal respiratory effort.  Clear to auscultation - no rales, rhonchi or wheezing.  HEART: Regular rhythm. Normal S1/S2. No murmurs.  BACK:  Mild diffuse tenderness to palpation over spinous processes, paraspinal muscles and SI joints.    SKIN: Clear. No significant rash, abnormal pigmentation or lesions  NEUROLOGIC: No focal findings. Cranial nerves grossly intact. Normal gait, strength and tone      Diagnostic Test Results:  Labs reviewed in Epic        Assessment & Plan     1. Chronic fatigue syndrome  2. Fibromyalgia  3. Chronic pain syndrome  Continue current medication regimen.  Recently exacerbated by GI symptoms which are being addressed by GI now.  I will complete his Standard Disability forms.    - milnacipran (SAVELLA) 100 MG TABS tablet; TAKE 1 TABLET(100 MG) BY MOUTH TWICE DAILY  Dispense: 180 tablet; Refill: 3  - pregabalin (LYRICA) 150 MG capsule; Take 1 capsule (150 mg) by mouth 2 times daily  Dispense: 180 capsule; Refill: 1    4. Recurrent major depressive disorder, in full remission (H)  stable/well controlled - Continue current medication regimen.   - milnacipran (SAVELLA) 100 MG TABS tablet; TAKE 1 TABLET(100 MG) BY MOUTH TWICE DAILY  Dispense: 180 tablet; Refill: 3    5. Chronic bilateral low back pain without sciatica  No red flag signs/symptoms.  Discussed trial of massage or acupuncture.  Encouraged him to start using his elliptical machine for a few minutes daily and increase gradually.    BMI:   Estimated body mass index is 25.29 kg/m  as calculated from the following:    Height as of this encounter: 1.791 m (5' 10.5\").    Weight as of this encounter: 81.1 kg (178 lb 12 oz).   Weight " management plan: Discussed healthy diet and exercise guidelines      Return in about 6 months (around 7/23/2020) for follow-up medical condition(s).    Danielle Orlando MD  [facility]

## 2020-01-23 ENCOUNTER — OFFICE VISIT (OUTPATIENT)
Dept: FAMILY MEDICINE | Facility: CLINIC | Age: 55
End: 2020-01-23
Payer: COMMERCIAL

## 2020-01-23 VITALS
WEIGHT: 178.75 LBS | BODY MASS INDEX: 25.02 KG/M2 | DIASTOLIC BLOOD PRESSURE: 76 MMHG | OXYGEN SATURATION: 95 % | HEIGHT: 71 IN | SYSTOLIC BLOOD PRESSURE: 120 MMHG | HEART RATE: 104 BPM | TEMPERATURE: 98.4 F

## 2020-01-23 DIAGNOSIS — G89.4 CHRONIC PAIN SYNDROME: ICD-10-CM

## 2020-01-23 DIAGNOSIS — F33.42 RECURRENT MAJOR DEPRESSIVE DISORDER, IN FULL REMISSION (H): ICD-10-CM

## 2020-01-23 DIAGNOSIS — G93.32 CHRONIC FATIGUE SYNDROME: Primary | ICD-10-CM

## 2020-01-23 DIAGNOSIS — G89.29 CHRONIC BILATERAL LOW BACK PAIN WITHOUT SCIATICA: ICD-10-CM

## 2020-01-23 DIAGNOSIS — M79.7 FIBROMYALGIA: ICD-10-CM

## 2020-01-23 DIAGNOSIS — M54.50 CHRONIC BILATERAL LOW BACK PAIN WITHOUT SCIATICA: ICD-10-CM

## 2020-01-23 PROCEDURE — 99214 OFFICE O/P EST MOD 30 MIN: CPT | Performed by: FAMILY MEDICINE

## 2020-01-23 RX ORDER — PREGABALIN 150 MG/1
150 CAPSULE ORAL 2 TIMES DAILY
Qty: 180 CAPSULE | Refills: 1 | Status: SHIPPED | OUTPATIENT
Start: 2020-01-23 | End: 2020-08-03

## 2020-01-23 RX ORDER — PANTOPRAZOLE SODIUM 40 MG/1
40 TABLET, DELAYED RELEASE ORAL 2 TIMES DAILY
COMMUNITY
Start: 2020-01-23 | End: 2020-03-12

## 2020-01-23 RX ORDER — PANTOPRAZOLE SODIUM 40 MG/1
40 FOR SUSPENSION ORAL DAILY
COMMUNITY
End: 2020-01-23

## 2020-01-23 ASSESSMENT — ANXIETY QUESTIONNAIRES
5. BEING SO RESTLESS THAT IT IS HARD TO SIT STILL: NOT AT ALL
7. FEELING AFRAID AS IF SOMETHING AWFUL MIGHT HAPPEN: NOT AT ALL
GAD7 TOTAL SCORE: 0
1. FEELING NERVOUS, ANXIOUS, OR ON EDGE: NOT AT ALL
3. WORRYING TOO MUCH ABOUT DIFFERENT THINGS: NOT AT ALL
2. NOT BEING ABLE TO STOP OR CONTROL WORRYING: NOT AT ALL
6. BECOMING EASILY ANNOYED OR IRRITABLE: NOT AT ALL
GAD7 TOTAL SCORE: 0
4. TROUBLE RELAXING: NOT AT ALL
GAD7 TOTAL SCORE: 0
7. FEELING AFRAID AS IF SOMETHING AWFUL MIGHT HAPPEN: NOT AT ALL

## 2020-01-23 ASSESSMENT — PATIENT HEALTH QUESTIONNAIRE - PHQ9
SUM OF ALL RESPONSES TO PHQ QUESTIONS 1-9: 4
SUM OF ALL RESPONSES TO PHQ QUESTIONS 1-9: 4
10. IF YOU CHECKED OFF ANY PROBLEMS, HOW DIFFICULT HAVE THESE PROBLEMS MADE IT FOR YOU TO DO YOUR WORK, TAKE CARE OF THINGS AT HOME, OR GET ALONG WITH OTHER PEOPLE: NOT DIFFICULT AT ALL

## 2020-01-23 ASSESSMENT — MIFFLIN-ST. JEOR: SCORE: 1649.99

## 2020-01-24 ASSESSMENT — PATIENT HEALTH QUESTIONNAIRE - PHQ9: SUM OF ALL RESPONSES TO PHQ QUESTIONS 1-9: 4

## 2020-01-24 ASSESSMENT — ANXIETY QUESTIONNAIRES: GAD7 TOTAL SCORE: 0

## 2020-01-30 ENCOUNTER — TRANSFERRED RECORDS (OUTPATIENT)
Dept: HEALTH INFORMATION MANAGEMENT | Facility: CLINIC | Age: 55
End: 2020-01-30

## 2020-02-08 ENCOUNTER — HEALTH MAINTENANCE LETTER (OUTPATIENT)
Age: 55
End: 2020-02-08

## 2020-03-09 ENCOUNTER — MYC MEDICAL ADVICE (OUTPATIENT)
Dept: FAMILY MEDICINE | Facility: CLINIC | Age: 55
End: 2020-03-09

## 2020-03-09 NOTE — TELEPHONE ENCOUNTER
"Dr. Orlando-Please review.      Patient also sent another Zachary Prell message today:  \"Dr. Orlando, I have neglected to include one detail of my condition from our trip to [facility], which continues to date.     Contemporaneous to the severe pain episode that started in [place] and I mentioned in my last message to you, I developed dizziness which occurs only immediately after I get up from a lying-down position and also immediately after I lie down. I do not get dizzy while I am up. This dizziness lasts no longer than 15 seconds and then goes away completely.      This condition is new to me (I have had dizziness episodes before, but not in this fashion). It started right after the severe pain episode began on the third full day in [place] and is still happening today.     Thank you.     Best regards,     Margaret Izaguirre\"        Writer planned on recommending patient schedule follow up visit with you to address dizziness and pain management.    Thank you!  CECIL RivasN, RN    "

## 2020-03-12 ENCOUNTER — OFFICE VISIT (OUTPATIENT)
Dept: FAMILY MEDICINE | Facility: CLINIC | Age: 55
End: 2020-03-12
Payer: COMMERCIAL

## 2020-03-12 VITALS
TEMPERATURE: 98.3 F | HEIGHT: 71 IN | HEART RATE: 117 BPM | RESPIRATION RATE: 16 BRPM | SYSTOLIC BLOOD PRESSURE: 122 MMHG | WEIGHT: 182 LBS | DIASTOLIC BLOOD PRESSURE: 90 MMHG | OXYGEN SATURATION: 99 % | BODY MASS INDEX: 25.48 KG/M2

## 2020-03-12 DIAGNOSIS — K29.00 OTHER ACUTE GASTRITIS WITHOUT HEMORRHAGE: ICD-10-CM

## 2020-03-12 DIAGNOSIS — M79.7 FIBROMYALGIA: ICD-10-CM

## 2020-03-12 DIAGNOSIS — G89.29 CHRONIC BILATERAL BACK PAIN, UNSPECIFIED BACK LOCATION: Primary | ICD-10-CM

## 2020-03-12 DIAGNOSIS — M54.9 CHRONIC BILATERAL BACK PAIN, UNSPECIFIED BACK LOCATION: Primary | ICD-10-CM

## 2020-03-12 DIAGNOSIS — H81.13 BENIGN PAROXYSMAL POSITIONAL VERTIGO, BILATERAL: ICD-10-CM

## 2020-03-12 PROCEDURE — 99214 OFFICE O/P EST MOD 30 MIN: CPT | Performed by: FAMILY MEDICINE

## 2020-03-12 RX ORDER — PANTOPRAZOLE SODIUM 40 MG/1
40 TABLET, DELAYED RELEASE ORAL 2 TIMES DAILY
Qty: 60 TABLET | Refills: 0 | Status: SHIPPED | OUTPATIENT
Start: 2020-03-12 | End: 2020-04-25

## 2020-03-12 RX ORDER — PANTOPRAZOLE SODIUM 40 MG/1
TABLET, DELAYED RELEASE ORAL
Qty: 0.1 TABLET | Refills: 0 | OUTPATIENT
Start: 2020-03-12

## 2020-03-12 ASSESSMENT — MIFFLIN-ST. JEOR: SCORE: 1664.74

## 2020-03-12 NOTE — TELEPHONE ENCOUNTER
"Requested Prescriptions   Pending Prescriptions Disp Refills     pantoprazole (PROTONIX) 40 MG EC tablet [Pharmacy Med Name: PANTOPRAZOLE 40MG TABLETS] 180 tablet      Sig: TAKE 1 TABLET(40 MG) BY MOUTH TWICE DAILY FOR 2 TO 4 WEEKS       PPI Protocol Passed - 3/12/2020 12:58 PM        Passed - Not on Clopidogrel (unless Pantoprazole ordered)        Passed - No diagnosis of osteoporosis on record        Passed - Recent (12 mo) or future (30 days) visit within the authorizing provider's specialty     Patient has had an office visit with the authorizing provider or a provider within the authorizing providers department within the previous 12 mos or has a future within next 30 days. See \"Patient Info\" tab in inbasket, or \"Choose Columns\" in Meds & Orders section of the refill encounter.              Passed - Medication is active on med list        Passed - Patient is age 18 or older           Refused Prescriptions:                       Disp   Refills    pantoprazole (PROTONIX) 40 MG EC tablet [P*0.1 ta*0        Sig: TAKE 1 TABLET(40 MG) BY MOUTH TWICE DAILY FOR 2 TO 4           WEEKS  Refused By: ZITA BOLAÑOS  Reason for Refusal: Duplicate      "

## 2020-03-12 NOTE — PATIENT INSTRUCTIONS
Start the protonix again - twice daily for 2-4 weeks.    Avoid ibuprofen.    Let me know if you'd like a referral to vestibular rehab for the vertigo.    Patient Education     Benign Paroxysmal Positional Vertigo     Your health care provider may move your head in certain ways to treat your BPPV.     Benign paroxysmal positional vertigo (BPPV) is a problem with the inner ear. The inner ear contains the vestibular system. This system is what helps you keep your balance. BPPV causes a feeling of spinning. It is a common problem of the vestibular system.  Understanding the vestibular system  The vestibular system of the ear is made up of very tiny parts. They include the utricle, saccule, and semicircular canals. The utricle is a tiny organ that contains calcium crystals. In some people, the crystals can move into the semicircular canals. When this happens, the system no longer works as it should. This causes BPPV. Benign means it is not life threatening. Paroxysmal means it happens suddenly. Positional means that it happens when you move your head. Vertigo is a feeling of spinning.  What causes BPPV?  Causes include injury to your head or neck. Other problems with the vestibular system may cause BPPV. In many people, the cause of BPPV is not known.  Symptoms of BPPV  You many have repeated feelings of spinning (vertigo). The vertigo usually lasts less than 1 minute. Some movements, such as rolling over in bed, can bring on vertigo.  Diagnosing BPPV  Your primary healthcare provider may diagnose and treat your BPPV. Or you may see an ear, nose, and throat doctor (otolaryngologist). In some cases, you may see a nervous system doctor (neurologist).  The healthcare provider will ask about your symptoms and your medical history. He or she will examine you. You may have hearing and balance tests. As part of the exam, your healthcare provider may have you move your head and body in certain ways. If you have BPPV, the  movements can bring on vertigo. Your provider will also look for abnormal movements of your eyes. You may have other tests to check your vestibular or nervous systems.  Treatment for BPPV  Your healthcare provider may try to move the calcium crystals. This is done by having you move your head and neck in certain ways. This treatment is safe and often works well. You may also be told to do these movements at home. You may still have vertigo for a few weeks. Your healthcare provider will recheck your symptoms, usually in about a month. Special physical therapy may also be part of treatment. In rare cases, surgery may be needed for BPPV that does not go away.     When to call the healthcare provider  Call your healthcare provider right away if you have any of these:    Symptoms that do not go away with treatment    Symptoms that get worse    New symptoms   Date Last Reviewed: 5/1/2017 2000-2019 [employer]. [address] [place] [address], [place] [address]. All rights reserved. This information is not intended as a substitute for professional medical care. Always follow your healthcare professional's instructions.

## 2020-03-12 NOTE — PROGRESS NOTES
Subjective     Margaret Izaguirre is a 55 year old male who presents to clinic today for the following health issues:    HPI   Dizziness    Duration: x1 month, started in February     Description   Feeling faint:  no   Feeling like the surroundings are moving: YES, when lying down and getting up dizziness occurs and surroundings move. After being up and walking around there is no dizziness  Loss of consciousness or falls: no     Intensity:  moderate    Accompanying signs and symptoms:   Nausea/vomitting: no   Palpitations: no   Weakness in arms or legs: no   Vision or speech changes: YES- in vision, will swirl   Ringing in ears (Tinnitus): no   Hearing loss related to dizziness: no   Other (fevers/chills/sweating/dyspnea): no     History (similar episodes/head trauma/previous evaluation/recent bleeding): None    Precipitating or alleviating factors (new meds/chemicals): lying down and getting up  Worse with activity/head movement: no     Therapies tried and outcome: None      When he first lies down or gets up - room spins x 15 seconds  Not precipitated by other head movement      Back Pain     Duration: exacerbation in February         Specific cause: fibromyalgia flare     Description:   Location of pain: low back, middle of back and upper back   Character of pain: sharp and stabbing  Pain radiation:none  New numbness or weakness in legs, not attributed to pain:  no     Intensity: Currently 1/10, can get severe    History:   Pain interferes with job: Not applicable  History of back problems: yes has had back pain  Any previous MRI or X-rays: None  Sees a specialist for back pain:  No  Therapies tried without relief: lyrica, zanaflex, tylenol, ibuprofen, lidocaine patches     Alleviating factors:   Improved by: ibuprofen but maybe for an hour     Precipitating factors:  Worsened by: Lying Flat    Sudden onset of back pain on Feb 13 shortly after he arrived in [facility] for his 2-week trip home.  [place] and had severe flare of his back pain.  He had difficulty sleeping.  Tried increasing his tizanidine dose but it wasn't helpful.  He also tried Salonpas lidocaine patches and prescription lidocaine patches and neither were helpful.  He tried to avoid NSAIDs due to his recently diagnosed gastritis (EGD in Jan).  After a week he did take ibuprofen and it was helpful.  He took ibuprofen 600 mg up to QID for a couple days.  Then started cutting back on the dose.  Last dose of ibuprofen was 2 days ago. Yesterday he developed upper abdominal pain.  Back pain is back to his baseline.      BP Readings from Last 3 Encounters:   03/12/20 (!) 122/90   01/23/20 120/76   12/30/19 122/86    Wt Readings from Last 3 Encounters:   03/12/20 82.6 kg (182 lb)   01/23/20 81.1 kg (178 lb 12 oz)   12/30/19 82.6 kg (182 lb)           Reviewed and updated as needed this visit by Provider  Meds  Problems         Review of Systems   ROS COMP: Constitutional, HEENT, gi and neuro systems are negative, except as otherwise noted.      Objective    BP (!) 122/90   Pulse 117   Temp 98.3  F (36.8  C) (Tympanic)   Resp 16   Ht 1.791 m (5' 10.5\")   Wt 82.6 kg (182 lb)   SpO2 99%   BMI 25.75 kg/m    Body mass index is 25.75 kg/m .  Physical Exam   GEN:  no apparent distress  EYES: PERRL, EOMI with no nystagmus, sclerae and conjunctivae clear with no discharge  ENT: external ears and nose without lesions or scars, TM's and canals clear bilaterally, oropharynx clear with moist mucus membranes and normal landmarks and Spencer-Hallpike reproduces vertigo in both directions as well as upon sitting up, negative head impulse test  NEURO:  No focal deficits noted, No facial asymmetry, Equal movement of all 4 extremities, Strength grossly intact, CN II-XII intact, Finger-nose-finger intact    Diagnostic Test Results:  Labs reviewed in Epic        Assessment & Plan     1. Chronic bilateral back pain, unspecified back " location  2. Fibromyalgia  Back pain is back to baseline.  He will maintain fibromyalgia regimen.      3. Other acute gastritis without hemorrhage  His epigastric pain is likely flare of recent NSAID-related gastritis.  I gave him another course of high-dose PPI and advised him to avoid NSAIDs at this time.  Patient instructed to contact clinic if symptoms persist, worsen, or do not resolve as anticipated.    - pantoprazole (PROTONIX) 40 MG EC tablet; Take 1 tablet (40 mg) by mouth 2 times daily for 2-4 weeks.  Dispense: 60 tablet; Refill: 0    4. Benign paroxysmal positional vertigo, bilateral  His BPPV is mild to moderate and we discussed that this may resolve spontaneously.  I offered vestibular rehab referral which he declined for now but he will let me know if he changes his mind.        Patient Instructions     Start the protonix again - twice daily for 2-4 weeks.    Avoid ibuprofen.    Let me know if you'd like a referral to vestibular rehab for the vertigo.    Patient Education     Benign Paroxysmal Positional Vertigo     Your health care provider may move your head in certain ways to treat your BPPV.     Benign paroxysmal positional vertigo (BPPV) is a problem with the inner ear. The inner ear contains the vestibular system. This system is what helps you keep your balance. BPPV causes a feeling of spinning. It is a common problem of the vestibular system.  Understanding the vestibular system  The vestibular system of the ear is made up of very tiny parts. They include the utricle, saccule, and semicircular canals. The utricle is a tiny organ that contains calcium crystals. In some people, the crystals can move into the semicircular canals. When this happens, the system no longer works as it should. This causes BPPV. Benign means it is not life threatening. Paroxysmal means it happens suddenly. Positional means that it happens when you move your head. Vertigo is a feeling of spinning.  What causes  BPPV?  Causes include injury to your head or neck. Other problems with the vestibular system may cause BPPV. In many people, the cause of BPPV is not known.  Symptoms of BPPV  You many have repeated feelings of spinning (vertigo). The vertigo usually lasts less than 1 minute. Some movements, such as rolling over in bed, can bring on vertigo.  Diagnosing BPPV  Your primary healthcare provider may diagnose and treat your BPPV. Or you may see an ear, nose, and throat doctor (otolaryngologist). In some cases, you may see a nervous system doctor (neurologist).  The healthcare provider will ask about your symptoms and your medical history. He or she will examine you. You may have hearing and balance tests. As part of the exam, your healthcare provider may have you move your head and body in certain ways. If you have BPPV, the movements can bring on vertigo. Your provider will also look for abnormal movements of your eyes. You may have other tests to check your vestibular or nervous systems.  Treatment for BPPV  Your healthcare provider may try to move the calcium crystals. This is done by having you move your head and neck in certain ways. This treatment is safe and often works well. You may also be told to do these movements at home. You may still have vertigo for a few weeks. Your healthcare provider will recheck your symptoms, usually in about a month. Special physical therapy may also be part of treatment. In rare cases, surgery may be needed for BPPV that does not go away.     When to call the healthcare provider  Call your healthcare provider right away if you have any of these:    Symptoms that do not go away with treatment    Symptoms that get worse    New symptoms   Date Last Reviewed: 5/1/2017 2000-2019 [employer]. [address] [place] [id number]. All rights reserved. This information is not intended as a substitute for professional medical care. Always follow your healthcare  professional's instructions.               No follow-ups on file.    Danielle Orlando MD  [facility] [place]

## 2020-04-25 ENCOUNTER — MYC REFILL (OUTPATIENT)
Dept: FAMILY MEDICINE | Facility: CLINIC | Age: 55
End: 2020-04-25

## 2020-04-25 DIAGNOSIS — K29.00 OTHER ACUTE GASTRITIS WITHOUT HEMORRHAGE: ICD-10-CM

## 2020-04-27 NOTE — TELEPHONE ENCOUNTER
"Requested Prescriptions   Pending Prescriptions Disp Refills     pantoprazole (PROTONIX) 40 MG EC tablet [Pharmacy Med Name: PANTOPRAZOLE 40MG TABLETS] 60 tablet 0     Sig: TAKE 1 TABLET(40 MG) BY MOUTH TWICE DAILY FOR 2 TO 4 WEEKS  Last Written Prescription Date:  3/12/2020  Last Fill Quantity: 60 tablet,  # refills: 0   Last Office Visit: 3/12/2020   Future Office Visit:            PPI Protocol Passed - 4/25/2020  8:43 PM        Passed - Not on Clopidogrel (unless Pantoprazole ordered)        Passed - No diagnosis of osteoporosis on record        Passed - Recent (12 mo) or future (30 days) visit within the authorizing provider's specialty     Patient has had an office visit with the authorizing provider or a provider within the authorizing providers department within the previous 12 mos or has a future within next 30 days. See \"Patient Info\" tab in inbasket, or \"Choose Columns\" in Meds & Orders section of the refill encounter.            Passed - Medication is active on med list        Passed - Patient is age 18 or older             "

## 2020-04-29 RX ORDER — PANTOPRAZOLE SODIUM 40 MG/1
40 TABLET, DELAYED RELEASE ORAL 2 TIMES DAILY
Qty: 60 TABLET | Refills: 0 | Status: SHIPPED | OUTPATIENT
Start: 2020-04-29 | End: 2020-08-03

## 2020-04-29 NOTE — TELEPHONE ENCOUNTER
PCP encounter from 3/12/20 instructed pt to only use for 2-4 weeks. Will route to PCP for review.    Angle Pimentel, PharmD  Medication Therapy Management Pharmacist

## 2020-04-29 NOTE — TELEPHONE ENCOUNTER
Routing refill request to provider for review/approval because:  Sig reads to take for 2-4 weeks.  Provider to review.  See patient My Chart message  Emily Bergeron RN

## 2020-05-05 ENCOUNTER — E-VISIT (OUTPATIENT)
Dept: FAMILY MEDICINE | Facility: CLINIC | Age: 55
End: 2020-05-05
Payer: COMMERCIAL

## 2020-05-05 DIAGNOSIS — Z53.9 ERRONEOUS ENCOUNTER--DISREGARD: Primary | ICD-10-CM

## 2020-05-05 RX ORDER — PANTOPRAZOLE SODIUM 40 MG/1
TABLET, DELAYED RELEASE ORAL
Qty: 60 TABLET | Refills: 0 | OUTPATIENT
Start: 2020-05-05

## 2020-05-06 ENCOUNTER — VIRTUAL VISIT (OUTPATIENT)
Dept: FAMILY MEDICINE | Facility: CLINIC | Age: 55
End: 2020-05-06
Payer: COMMERCIAL

## 2020-05-06 DIAGNOSIS — K29.00 OTHER ACUTE GASTRITIS WITHOUT HEMORRHAGE: ICD-10-CM

## 2020-05-06 DIAGNOSIS — R10.13 DYSPEPSIA: ICD-10-CM

## 2020-05-06 DIAGNOSIS — R10.13 DYSPEPSIA: Primary | ICD-10-CM

## 2020-05-06 PROCEDURE — 99213 OFFICE O/P EST LOW 20 MIN: CPT | Mod: 95 | Performed by: FAMILY MEDICINE

## 2020-05-06 RX ORDER — PANTOPRAZOLE SODIUM 20 MG/1
20 TABLET, DELAYED RELEASE ORAL DAILY
Qty: 30 TABLET | Refills: 0 | Status: SHIPPED | OUTPATIENT
Start: 2020-05-06 | End: 2020-05-07

## 2020-05-06 NOTE — PATIENT INSTRUCTIONS
Proton Pump Inhibitor (PPI) medications, such as omeprazole/PRILOSEC, lansoprazole/PREVACID, esomeprazole/NEXIUM, rabeprazole/ACIPHEX, and pantoprazole/PROTONIX are very helpful at controlling symptoms caused by excessive stomach acid (such as dyspepsia and heartburn).  However, increasing evidence has shown that there may be risks associated with long-term use of PPI's.  These associations include:  * pneumonia  * C dif colitis - a severe, bacterial diarrhea  * low levels of magnesium  * decreased bone density and increased bone fractures  * poor kidney function  * dementia.    It is always best to use the least amount of medications necessary for your health conditions and this is also true for PPI's.  However, if you've been on a PPI medication long-term (more than a few months) you may get rebound hyperacidity if you discontinue it abruptly.  We can help you determine if you are a candidate to come off of your PPI medication and help you with a strategy to very gradually wean down and off of it - or switch to a safer, milder class of acid-reducing medications, the H2 Blockers (such as famotidine/PEPCID or cimetidine/TAGAMET).

## 2020-05-06 NOTE — PROGRESS NOTES
"Margaret Izaguirre is a 55 year old male who is being evaluated via a billable video visit.      The patient has been notified of following:     \"This video visit will be conducted via a call between you and your physician/provider. We have found that certain health care needs can be provided without the need for an in-person physical exam.  This service lets us provide the care you need with a video conversation.  If a prescription is necessary we can send it directly to your pharmacy.  If lab work is needed we can place an order for that and you can then stop by our lab to have the test done at a later time.    Video visits are billed at different rates depending on your insurance coverage.  Please reach out to your insurance provider with any questions.    If during the course of the call the physician/provider feels a video visit is not appropriate, you will not be charged for this service.\"    Patient has given verbal consent for Video visit? Yes    How would you like to obtain your AVS? Valentín    Patient would like the video invitation sent by: Send to e-mail at: [email or web address]    Will anyone else be joining your video visit? No        Subjective     Margaret Izaguirre is a 55 year old male who presents to clinic today for the following health issues:    HPI     GERD/GASTRITIS FOLLOW-UP   Margaret developed upper GI distress last November.  He described this as epigastric fullness.  I referred him to GI and he had EGD 1/30/2020.  That showed mild gastritis and pathology was negative for H pylori.  GI felt this was related to his increased use of NSAIDs.  They treated him with pantoprazole 40 mg BID x 4 weeks and advised cutting down on NSAID use.      When I saw him in mid-March he noted that he had used NSAIDs again and was having some recurrence of the GI symptoms.  I advised cessation of NSAIDs and gave him another 30-day supply of pantoprazole 40 mg BID.      Today he states that helped and he has continued to " "avoid NSAIDs.  He did have mild recurrence of his GI symptoms at the end of April, a couple weeks after completing the second month of high-dose PPI.  He requested a refill which was approved last week in my absence but I reached out to him to discuss this further.      Today he again describes these recent symptoms as vague sense of not feeling right in the epigastrium.  He has cut back on fatty foods and has taken pantoprazole 40 mg BID for the past week.      Video Start Time: 3:27        BP Readings from Last 3 Encounters:   03/12/20 (!) 122/90   01/23/20 120/76   12/30/19 122/86    Wt Readings from Last 3 Encounters:   03/12/20 82.6 kg (182 lb)   01/23/20 81.1 kg (178 lb 12 oz)   12/30/19 82.6 kg (182 lb)               Reviewed and updated as needed this visit by Provider  Meds  Problems         Review of Systems   GI: NEGATIVE except as noted above      Objective    There were no vitals taken for this visit.  Estimated body mass index is 25.75 kg/m  as calculated from the following:    Height as of 3/12/20: 1.791 m (5' 10.5\").    Weight as of 3/12/20: 82.6 kg (182 lb).  Physical Exam     GENERAL: healthy, alert and no distress  EYES: Eyes grossly normal to inspection, conjunctivae and sclerae normal  RESP: no audible wheeze, cough, or visible cyanosis.  No visible retractions or increased work of breathing.  Able to speak fully in complete sentences.  NEURO: Cranial nerves grossly intact, mentation intact and speech normal  PSYCH: mentation appears normal, affect normal/bright, judgement and insight intact, normal speech and appearance well-groomed      Diagnostic Test Results:  Labs reviewed in Epic        Assessment & Plan       ICD-10-CM    1. Dyspepsia  R10.13 pantoprazole (PROTONIX) 20 MG EC tablet   2. Other acute gastritis without hemorrhage  K29.00 pantoprazole (PROTONIX) 20 MG EC tablet     Discussed risks of long-term use of PPI, including increased risk of community-acquired pneumonia, C. dif " colitis, hypomagnesemia, decreased bone density and fractures, CKD and/or KASSANDRA, and dementia.  Discussed that long-term use can lead to up-regulation of proton pumps and I recommend against long-term use of high-dose PPI.  Now that he's been on 40 mg BID for a week I'd like him to try dropping the dose to 40 mg QD.  If that goes well in another couple of weeks he can drop the dose to 20 mg (new Rx sent to his pharmacy).  Discussed that ideally we'd like to control his symptoms with an H2 blocker.  I advised him again to avoid NSAIDs.       Return in about 1 month (around 6/6/2020) for follow-up medication change.    Danielle Orlando MD  [facility]      Video-Visit Details    Type of service:  Video Visit    Video End Time: 3:42    Originating Location (pt. Location): Home    Distant Location (provider location):  [facility]     Platform used for Video Visit: K[employer]    Return in about 1 month (around 6/6/2020) for follow-up medication change.       Danielle Orlando MD

## 2020-05-07 RX ORDER — PANTOPRAZOLE SODIUM 20 MG/1
TABLET, DELAYED RELEASE ORAL
Qty: 90 TABLET | Refills: 0 | Status: SHIPPED | OUTPATIENT
Start: 2020-05-07 | End: 2020-08-03

## 2020-06-23 ENCOUNTER — MYC MEDICAL ADVICE (OUTPATIENT)
Dept: FAMILY MEDICINE | Facility: CLINIC | Age: 55
End: 2020-06-23

## 2020-06-23 DIAGNOSIS — H81.13 BENIGN PAROXYSMAL POSITIONAL VERTIGO, BILATERAL: Primary | ICD-10-CM

## 2020-06-23 NOTE — TELEPHONE ENCOUNTER
per office visit notes:    vestibular rehab referral which he declined for now but he will let me know if he changes his mind.

## 2020-07-10 ENCOUNTER — HOSPITAL ENCOUNTER (OUTPATIENT)
Dept: PHYSICAL THERAPY | Facility: CLINIC | Age: 55
Setting detail: THERAPIES SERIES
End: 2020-07-10
Attending: FAMILY MEDICINE
Payer: COMMERCIAL

## 2020-07-10 DIAGNOSIS — H81.13 BENIGN PAROXYSMAL POSITIONAL VERTIGO, BILATERAL: ICD-10-CM

## 2020-07-10 PROCEDURE — 97161 PT EVAL LOW COMPLEX 20 MIN: CPT | Mod: GP | Performed by: PHYSICAL THERAPIST

## 2020-07-10 PROCEDURE — 95992 CANALITH REPOSITIONING PROC: CPT | Mod: GP | Performed by: PHYSICAL THERAPIST

## 2020-07-10 NOTE — PROGRESS NOTES
"   07/10/20 1000   Quick Adds   Quick Adds Vestibular Eval   Type of Visit Initial OP PT Evaluation   General Information   Start of Care Date 07/10/20   Referring Physician Danielle Orlando MD   Orders Evaluate and Treat as Indicated   Order Date 06/23/20   Medical Diagnosis BPPV, bilateral (H81.13)   Precautions/Limitations no known precautions/limitations   Surgical/Medical history reviewed Yes   Pertinent history of current problem (include personal factors and/or comorbidities that impact the POC) Patient reports traveling to [place] in February, soon after his return is when vertigo started. Symptoms lasting 15-20 seconds, vertigo, with head movements and bed mobility. Patient reports disequilbrium with head turns quick. Patient denies motion sensitivity. Patient reports new onset of amplified noise in ears- higher pitch; ~50% of the day. Patient has not had ENT/audiology testing. PMH: fibromyalgia   Pertinent Visual History  Patient denies issues   Prior level of function comment Independent; enjoys traveling   Patient role/Employment history Disabled   [facility]   Home/Community Accessibility Comments No issues   Assistive Devices Comments None   Patient/Family Goals Statement \"To not have the dizziness\"   Fall Risk Screen   Fall screen completed by PT   Have you fallen 2 or more times in the past year? No   Have you fallen and had an injury in the past year? No   Is patient a fall risk? No   System Outcome Measures   Outcome Measures BPPV   Dizziness Handicap Inventory (score out of 100) A decrease in score by 17.18 or greater indicates a clinically significant change in symptoms. 16   Pain   Patient currently in pain No   Pain comments Patient has fibromyalgia   Cognitive Status Examination   Orientation orientation to person, place and time   Level of Consciousness alert   Follows Commands and Answers Questions 100% of the time;able to follow multistep instructions   Personal Safety " and Judgment intact   Memory intact   Integumentary   Integumentary No deficits were identified   Posture   Posture Normal   Range of Motion (ROM)   ROM Comment WNL   Strength   Strength Comments WNL   Bed Mobility   Bed Mobility Comments Independent   Transfer Skills   Transfer Comments Independent   Gait   Gait Comments Patient ambulates independently; decreased head motion    Sensory Examination   Sensory Perception no deficits were identified   Coordination   Coordination no deficits were identified   Muscle Tone   Muscle Tone no deficits were identified   Cervicogenic Screen   Neck ROM WNL   Oculomotor Exam   Smooth Pursuit Normal   Saccades Normal   VOR Normal   VOR Cancellation Normal   Convergence Testing Normal   Infrared Goggle Exam or Frenzel Lense Exam   Vestibular Suppressant in Last 24 Hours? No   Exam completed with Infrared Goggles   Spontaneous Nystagmus Negative   Gaze Evoked Nystagmus Negative   Head Shake Horizontal Nystagmus Other   Head Shake Horizontal Nystagmus comments disequilibrium symptoms   Spencer-Hallpike (right) Negative   [place]-Hallpike (right) comments initial vertigo ~5 seconds; no nystagmus   Spencer-Hallpike (Left) Upbeating L torsional   [place]-Hallpike (left) comments 5 second latency, 15 second duration, mild nystagmus, moderate symptoms   HSCC Supine Roll Test (Right) Negative   HSCC Supine Roll Test (Right) Comments disequilibrium with convergence spasm; no nystagmus   HSCC Supine Roll Test (Left) Negative   HSCC Supine Roll Test (Left) Comments  disequilibrium with convergence spasm; no nystagmus   Supine Neck Extension Test Negative   Supine Neck Extension Test Comments disequilibrium with convergence spasm; no nystagmus   BPPV Canal(s) L Posterior   BPPV Type Canalithasis   Dynamic Visual Acuity (DVA)   DVA Comments Did not assess due to time/space constraints   Planned Therapy Interventions   Planned Therapy Interventions balance training;gait training;neuromuscular  re-education;strengthening;manual therapy   Clinical Impression   Criteria for Skilled Therapeutic Interventions Met yes, treatment indicated   PT Diagnosis dizziness   Influenced by the following impairments oculomotor control, symptoms with position/functional activities   Functional limitations due to impairments decreased participation in activities, fall risk with vertigo   Clinical Presentation Stable/Uncomplicated   Clinical Presentation Rationale personal factors, body systems involved   Clinical Decision Making (Complexity) Low complexity   Therapy Frequency 1 time/week   Predicted Duration of Therapy Intervention (days/wks) up to 90 days   Risk & Benefits of therapy have been explained Yes   Patient, Family & other staff in agreement with plan of care Yes   Clinical Impression Comments Patient is a 55 year old male who presents to OP PT with reports of positional vertigo since February 2020; newer onset (past couple of weeks) of disequilbrium with head movements and amplified noise. Patient demonstrates clinical symptoms correlated with L posteior canal BPPV. Patient also demonstrates positional convergence spasms, as well with symptoms present. Patient would benefit from skilled PT services in order to improve symptom management and activity participation.    Education Assessment   Preferred Learning Style Demonstration;Pictures/video   Barriers to Learning No barriers   GOALS   PT Eval Goals 1;2;3;4   Goal 1   Goal Identifier DHI   Goal Description Patient will score a 5 or less on the DHI for improved symptom management   Target Date 10/07/20   Goal 2   Goal Identifier DVA   Goal Description Patient will score <3 line loss on the DVA to improved symptom management with functional activities   Target Date 10/07/20   Goal 3   Goal Identifier FGA   Goal Description Patient will score at least 26/30 on the FGA to reduce fall risk   Target Date 10/07/20   Goal 4   Goal Identifier HEP   Goal Description  Patient will be independent with HEP for maintenance of therapy gains   Target Date 10/07/20   Total Evaluation Time   PT Eval, Low Complexity Minutes ([id number]) 25     Eli Benoit PT,DPT

## 2020-07-15 ENCOUNTER — HOSPITAL ENCOUNTER (OUTPATIENT)
Dept: PHYSICAL THERAPY | Facility: CLINIC | Age: 55
Setting detail: THERAPIES SERIES
End: 2020-07-15
Attending: FAMILY MEDICINE
Payer: COMMERCIAL

## 2020-07-15 PROCEDURE — 97112 NEUROMUSCULAR REEDUCATION: CPT | Mod: GP | Performed by: PHYSICAL THERAPIST

## 2020-07-23 NOTE — PROGRESS NOTES
Rehabilitation Services        OUTPATIENT PHYSICAL THERAPY FUNCTIONAL EVALUATION  PLAN OF TREATMENT FOR OUTPATIENT REHABILITATION  (COMPLETE FOR INITIAL CLAIMS ONLY)  Patient's Last Name, First Name, M.I.  YOB: 1965  Margaret Izaguirre        Provider's Name   Eli Benoit PT   Medical Record No.  [id number]     Start of Care Date:  07/10/20   Onset Date:   6/23/2020   Type:     _X__PT   ____OT  ____SLP Medical Diagnosis:   BPPV, bilateral (H81.13)     PT Diagnosis:  dizziness Visits from SOC:  1                              __________________________________________________________________________________  Plan of Treatment/Functional Goals:  balance training, gait training, neuromuscular re-education, strengthening, manual therapy           GOALS  DHI  Patient will score a 5 or less on the DHI for improved symptom management  10/07/20    DVA  Patient will score <3 line loss on the DVA to improved symptom management with functional activities  10/07/20    FGA  Patient will score at least 26/30 on the FGA to reduce fall risk  10/07/20    HEP  Patient will be independent with HEP for maintenance of therapy gains  10/07/20         Therapy Frequency:  1 time/week   Predicted Duration of Therapy Intervention:  up to 90 days    Eli Benoit PT                                    I CERTIFY THE NEED FOR THESE SERVICES FURNISHED UNDER        THIS PLAN OF TREATMENT AND WHILE UNDER MY CARE     (Physician co-signature of this document indicates review and certification of the therapy plan).                Certification Date From:    7/10/2020  Certification Date To:   10/7/2020    Referring Provider:  Danielle Orlando MD    Initial Assessment  See Epic Evaluation- Start of Care Date: 07/10/20

## 2020-07-31 ENCOUNTER — HOSPITAL ENCOUNTER (OUTPATIENT)
Dept: PHYSICAL THERAPY | Facility: CLINIC | Age: 55
Setting detail: THERAPIES SERIES
End: 2020-07-31
Attending: FAMILY MEDICINE
Payer: COMMERCIAL

## 2020-07-31 PROCEDURE — 95992 CANALITH REPOSITIONING PROC: CPT | Mod: GP | Performed by: PHYSICAL THERAPIST

## 2020-08-03 ENCOUNTER — VIRTUAL VISIT (OUTPATIENT)
Dept: FAMILY MEDICINE | Facility: CLINIC | Age: 55
End: 2020-08-03
Payer: COMMERCIAL

## 2020-08-03 DIAGNOSIS — R10.13 DYSPEPSIA: ICD-10-CM

## 2020-08-03 DIAGNOSIS — R42 DIZZINESS: Primary | ICD-10-CM

## 2020-08-03 DIAGNOSIS — G89.4 CHRONIC PAIN SYNDROME: ICD-10-CM

## 2020-08-03 DIAGNOSIS — K29.00 OTHER ACUTE GASTRITIS WITHOUT HEMORRHAGE: ICD-10-CM

## 2020-08-03 DIAGNOSIS — G93.32 CHRONIC FATIGUE SYNDROME: ICD-10-CM

## 2020-08-03 DIAGNOSIS — M79.7 FIBROMYALGIA: ICD-10-CM

## 2020-08-03 PROCEDURE — 99214 OFFICE O/P EST MOD 30 MIN: CPT | Mod: 95 | Performed by: FAMILY MEDICINE

## 2020-08-03 RX ORDER — PANTOPRAZOLE SODIUM 20 MG/1
20 TABLET, DELAYED RELEASE ORAL DAILY
Qty: 90 TABLET | Refills: 1 | Status: SHIPPED | OUTPATIENT
Start: 2020-08-03 | End: 2020-12-16

## 2020-08-03 RX ORDER — PREGABALIN 150 MG/1
150 CAPSULE ORAL 2 TIMES DAILY
Qty: 180 CAPSULE | Refills: 1 | Status: SHIPPED | OUTPATIENT
Start: 2020-08-03 | End: 2021-03-23

## 2020-08-03 ASSESSMENT — ANXIETY QUESTIONNAIRES
1. FEELING NERVOUS, ANXIOUS, OR ON EDGE: NOT AT ALL
7. FEELING AFRAID AS IF SOMETHING AWFUL MIGHT HAPPEN: NOT AT ALL
6. BECOMING EASILY ANNOYED OR IRRITABLE: NOT AT ALL
5. BEING SO RESTLESS THAT IT IS HARD TO SIT STILL: NOT AT ALL
3. WORRYING TOO MUCH ABOUT DIFFERENT THINGS: NOT AT ALL
2. NOT BEING ABLE TO STOP OR CONTROL WORRYING: NOT AT ALL
GAD7 TOTAL SCORE: 0

## 2020-08-03 ASSESSMENT — PATIENT HEALTH QUESTIONNAIRE - PHQ9
SUM OF ALL RESPONSES TO PHQ QUESTIONS 1-9: 3
5. POOR APPETITE OR OVEREATING: NOT AT ALL

## 2020-08-03 NOTE — PROGRESS NOTES
"Margaret Izaguirre is a 55 year old male who is being evaluated via a billable video visit.      The patient has been notified of following:     \"This video visit will be conducted via a call between you and your physician/provider. We have found that certain health care needs can be provided without the need for an in-person physical exam.  This service lets us provide the care you need with a video conversation.  If a prescription is necessary we can send it directly to your pharmacy.  If lab work is needed we can place an order for that and you can then stop by our lab to have the test done at a later time.    Video visits are billed at different rates depending on your insurance coverage.  Please reach out to your insurance provider with any questions.    If during the course of the call the physician/provider feels a video visit is not appropriate, you will not be charged for this service.\"    Patient has given verbal consent for Video visit? Yes  How would you like to obtain your AVS? MyChart  If you are dropped from the video visit, the video invite should be resent to: Text to cell phone: [phone]  Will anyone else be joining your video visit? No      Subjective     Margaret Izaguirre is a 55 year old male who presents today via video visit for the following health issues:    HPI    Follow up dizziness after [facility] rehab with occupational therapist     Video Start Time: 10:40 AM    BPPV Follow-Up   He had 3 sessions with vestibular rehab for dizziness.  Initially it was felt to be BPPV and he learned Epley maneuver which helped after the first session.  However, after the vertigo subsided he realized he had a second type of dizziness that felt more like a \"sinking sensation.\" It feels like he's being \"pulled down - like in an elevator.\"  OT Vestibular therapist gave him eye exercises at his second session but then the BPPV worsened.  At third session he did Epley maneuver again which helped the vertigo but the sinking " "sensation continues.  He has had some tinnitis and inolerance to high-pitched sounds in the right ear.  No ear pain or pressure.  Some ear discharge.  No fever.  No sinus pressure.      Dyspepsia Follow-Up   He was able to tolerate wean of panotprazole dose from 40 mg BID to 20 mg every day with symptoms remaining well controlled.    Fibromyalgia and Chronic Fatigue Syndrome Follow-Up   His symptoms are stable.  No significant or prolonged flares recently.  He continues to take milnacipran, pregabalin, and tizanidine to manage his symptoms.    BP Readings from Last 3 Encounters:   03/12/20 (!) 122/90   01/23/20 120/76   12/30/19 122/86    Wt Readings from Last 3 Encounters:   03/12/20 82.6 kg (182 lb)   01/23/20 81.1 kg (178 lb 12 oz)   12/30/19 82.6 kg (182 lb)           Reviewed and updated as needed this visit by Provider  Meds  Problems         Review of Systems   Constitutional, HEENT, gi and msk systems are negative, except as otherwise noted.      Objective    Vitals - Patient Reported  Weight (Patient Reported): 80.7 kg (178 lb)  Height (Patient Reported): 180.3 cm (5' 11\")  BMI (Based on Pt Reported Ht/Wt): 24.83    Physical Exam     GENERAL: Healthy, alert and no distress  EYES: Eyes grossly normal to inspection.  No discharge or erythema, or obvious scleral/conjunctival abnormalities.  RESP: No audible wheeze, cough, or visible cyanosis.  No visible retractions or increased work of breathing.    SKIN: Visible skin clear. No significant rash, abnormal pigmentation or lesions.  NEURO: Cranial nerves grossly intact.  Mentation and speech appropriate for age.  PSYCH: Mentation appears normal, affect normal/bright, judgement and insight intact, normal speech and appearance well-groomed.      Diagnostic Test Results:  Labs reviewed in Epic        Assessment & Plan     1. Dizziness  OT recommended audiology vestibular testing to further characterize the second form of dizziness.  I'd like for him to see ENT " first and referred him back to the clinic that did his septoplasty.  - OTOLARYNGOLOGY REFERRAL    2. Other acute gastritis without hemorrhage  3. Dyspepsia  Acute gastritis is resolved and he has maintained symptom control with low-dose PPI.  - pantoprazole (PROTONIX) 20 MG EC tablet; Take 1 tablet (20 mg) by mouth daily  Dispense: 90 tablet; Refill: 1    4. Fibromyalgia  5. Chronic pain syndrome  6. Chronic fatigue syndrome  stable/adequately controlled - Continue current medication regimen.   - pregabalin (LYRICA) 150 MG capsule; Take 1 capsule (150 mg) by mouth 2 times daily  Dispense: 180 capsule; Refill: 1          No follow-ups on file.    Danielle Orlando MD  [facility]      Video-Visit Details    Type of service:  Video Visit    Video End Time:10:52 AM    Originating Location (pt. Location): Home    Distant Location (provider location):  [facility]     Platform used for Video Visit: [employer]    No follow-ups on file.       Danielle Orlando MD

## 2020-08-04 ASSESSMENT — ANXIETY QUESTIONNAIRES: GAD7 TOTAL SCORE: 0

## 2020-08-31 ENCOUNTER — MYC MEDICAL ADVICE (OUTPATIENT)
Dept: FAMILY MEDICINE | Facility: CLINIC | Age: 55
End: 2020-08-31

## 2020-08-31 DIAGNOSIS — H81.10 BENIGN PAROXYSMAL POSITIONAL VERTIGO, UNSPECIFIED LATERALITY: Primary | ICD-10-CM

## 2020-09-01 NOTE — TELEPHONE ENCOUNTER
----- Message from Eli Benoit PT sent at 8/31/2020  2:12 PM CDT -----  Regarding: PT orders  Dr. Orlando-    Would you be able to put in another order for vestibular physical therapy? I saw Margaret at the [place] location, but I am now back at the [facility] in [place]. Due to the location change he will need new PT orders.    Thank you for referring him back to his previous ENT to get some questions answered.    Let me know if you have any questions.    Thank you,  Renate Benoit PT, DPT  Physical Therapist  [facility] [address]  [address] [place] [address]  [email or web address]  Office: [phone]

## 2020-09-14 ENCOUNTER — THERAPY VISIT (OUTPATIENT)
Dept: PHYSICAL THERAPY | Facility: CLINIC | Age: 55
End: 2020-09-14
Payer: MEDICARE

## 2020-09-14 DIAGNOSIS — H81.10 BENIGN PAROXYSMAL POSITIONAL VERTIGO, UNSPECIFIED LATERALITY: ICD-10-CM

## 2020-09-14 NOTE — PROGRESS NOTES
09/14/20 0800   Quick Adds   Quick Adds Certification;Vestibular Eval   Type of Visit Initial OP PT Evaluation   General Information   Start of Care Date 09/14/20   Referring Physician Danielle Orlando MD   Orders Evaluate and Treat as Indicated   Order Date 09/01/20   Medical Diagnosis BPPV (H81.10)   Onset of illness/injury or Date of Surgery 09/01/20   Precautions/Limitations no known precautions/limitations   Surgical/Medical history reviewed Yes   Pertinent history of current vestibular problem (include personal factors and/or comorbidities that impact the POC)  Hearing loss   Hearing Loss Comments L side   Pertinent history of current problem (include personal factors and/or comorbidities that impact the POC) Patient reports pulling sensation at variable times; unable to state exact triggering mechanism. Patient can get it with movements at neck/head, but not all the time. Symptoms reported as sinking/pulling sensation (like being on the elevator) and some imbalance, lasts ~5 seconds then returns back to normal. Patient reports it happens ~2-3 times/day; always sitting or standing (not laying down). Patient went to ENT; had MRI of brain which was negative and an audiogram that showed L sided hearing loss. Patient had previous PT for BPPV (R posterior canal). Patient reports symptoms started in February 2020 after traveling to [place]; vertigo first then pulling sensation after. Patient has order for VNG through his own ENT, but is trying to get scheduling with wife figured out. PMH: R posterior canal BPPV, fibromyalgia   Pertinent Visual History  Wears glasses   Prior level of function comment Independent    Previous/Current Treatment Physical Therapy   Improvement after PT Significant  (BPPV in July 2020 (me))   Patient role/Employment history Disabled  (fibromyalgia)   Living environment House/townhome   Home/Community Accessibility Comments no issues   Assistive Devices Comments None   Patient/Family Goals  "Statement \"To feel more balanced and not get the pulling sensation\"   Fall Risk Screen   Fall screen completed by PT   Have you fallen 2 or more times in the past year? No   Have you fallen and had an injury in the past year? No   Is patient a fall risk? No   Pain   Patient currently in pain No   Pain comments PMH: fibromyalgia; no concerns today   Cognitive Status Examination   Orientation orientation to person, place and time   Level of Consciousness alert   Follows Commands and Answers Questions 100% of the time;able to follow multistep instructions   Personal Safety and Judgment intact   Memory intact   Range of Motion (ROM)   ROM Quick Adds no deficits were identified   Strength   Manual Muscle Testing Quick Adds No deficits were identified   Bed Mobility   Bed Mobility Comments Independent   Transfer Skills   Transfer Comments Independent   Gait   Gait Comments Patient ambulates without device independently; some en bloc patterning noted, decreased B arm swing. Increased imbalance with horizontal head turns   Gait Special Tests   Gait Special Tests FUNCTIONAL GAIT ASSESSMENT   Gait Special Tests Functional Gait Assessment Score out of 30   Score out of 30 27   Balance Special Tests   Balance Special Tests Modified CTSIB Conditions   Balance Special Tests Modified CTSIB Conditions   Condition 1, seconds 30 Seconds   Condition 2, seconds 30 Seconds   Condition 4, seconds 30 Seconds   Condition 5, seconds 30 Seconds   Modified CTSIB Comments moderate sway condition 5   Sensory Examination   Sensory Perception no deficits were identified   Coordination   Coordination no deficits were identified   Muscle Tone   Muscle Tone no deficits were identified   Cervicogenic Screen   Neck ROM WNL   Neck Torsion Test (head still, body rotating) Negative   Neck Torsion Test (head and body rotating) Negative   Oculomotor Exam   Smooth Pursuit Normal   Saccades Normal   VOR Normal   VOR Cancellation Other   VOR Cancellation " Comments motion sensitivity; reproduced sinking feeling with off balance   Rapid Head Thrust Corrective Saccade L head thrust   Rapid Head Thrust Comments inconsistent   Convergence Testing Normal   Infrared Goggle Exam or Frenzel Lense Exam   Vestibular Suppressant in Last 24 Hours? Yes   Exam completed with Infrared Goggles   Spontaneous Nystagmus Negative   Gaze Evoked Nystagmus Other   Gaze Evoked Nystagmus comments convergence spasms when looking down; asymptomatic   Head Shake Horizontal Nystagmus Horizontal L   Head Shake Horizontal Nystagmus comments very slow, ~3-4 beats. moderate disequilbrium symptoms   Spencer-Hallpike (right) Other   Bainbridge-Hallpike (right) comments strong pulling back sensation when returning upright; no nystagmus   Spencer-Hallpike (Left) Other   Bainbridge-Hallpike (left) comments moderate disequilibrium descending/ascending; no nystagmus   [facility] Supine Roll Test (Right) Negative   [facility] Supine Roll Test (Left) Negative   Planned Therapy Interventions   Planned Therapy Interventions balance training;gait training;neuromuscular re-education;ROM;strengthening;stretching;manual therapy   Clinical Impression   Criteria for Skilled Therapeutic Interventions Met yes, treatment indicated   PT Diagnosis dizziness   Influenced by the following impairments symptoms, oculomotor control, balane   Functional limitations due to impairments decreased participation in daily activities, symptom elicitation   Clinical Presentation Stable/Uncomplicated   Clinical Presentation Rationale personal factors, body systems involved   Clinical Decision Making (Complexity) Low complexity   Therapy Frequency 1 time/week   Predicted Duration of Therapy Intervention (days/wks) up to 90 days   Risk & Benefits of therapy have been explained Yes   Patient, Family & other staff in agreement with plan of care Yes   Clinical Impression Comments Patient is a 55 year old male who presents to OP PT with reports of disequilbirum symptoms  beginning after travel to [place] in February 2020; patient previously seen for BPPV treatment, which has now resolved. Patient demonstrates above listed impairments which are impacting his participation in daily activities. Patient scheduled to have VNG at outside ENT clinic which will be beneficial in guiding interventions. Patient would benefit from skilled PT services for symptom management and balance retraining.   Education Assessment   Preferred Learning Style Reading;Demonstration;Pictures/video   Barriers to Learning No barriers   GOALS   PT Eval Goals 1;2;3   Goal 1   Goal Identifier Symptoms   Goal Description Patient will report at least 50% decrease in symptoms throughout the day for improved activity participation   Target Date 12/12/20   Goal 2   Goal Identifier BERTEC   Goal Description Patient will score WNL for composite balance score on the BERTEC SOT for improved gait stability   Target Date 12/12/20   Goal 3   Goal Identifier HEP   Goal Description Patient will be independent in HEP for maintenance of therapy gains   Target Date 12/12/20   Total Evaluation Time   PT Eval, Low Complexity Minutes (27607) 30   Therapy Certification   Certification date from 09/14/20   Certification date to 12/12/20   Medical Diagnosis BPPV (H81.10)   Certification I certify the need for these services furnished under this plan of treatment and while under my care.  (Physician co-signature of this document indicates review and certification of the therapy plan).     Eli Benoit PT, DPT  Physical Therapist  [facility] - [address]  [address] [place] [address]  [email or web address]  Office: [phone]

## 2020-09-14 NOTE — PROGRESS NOTES
Rehabilitation Services        OUTPATIENT PHYSICAL THERAPY FUNCTIONAL EVALUATION  PLAN OF TREATMENT FOR OUTPATIENT REHABILITATION  (COMPLETE FOR INITIAL CLAIMS ONLY)  Patient's Last Name, First Name, M.I.  YOB: 1965  Margaret Izaguirre        Provider's Name   Eli Benoit PT   Medical Record No.  [id number]     Start of Care Date:  09/14/20   Onset Date:  09/01/20   Type:     _X__PT   ____OT  ____SLP Medical Diagnosis:  BPPV (H81.10)     PT Diagnosis:  dizziness Visits from SOC:  1                              __________________________________________________________________________________  Plan of Treatment/Functional Goals:  balance training, gait training, neuromuscular re-education, ROM, strengthening, stretching, manual therapy           GOALS  Symptoms  Patient will report at least 50% decrease in symptoms throughout the day for improved activity participation  12/12/20    BERTEC  Patient will score WNL for composite balance score on the BERTEC SOT for improved gait stability  12/12/20    HEP  Patient will be independent in [facility] for maintenance of therapy gains  12/12/20           Therapy Frequency:  1 time/week   Predicted Duration of Therapy Intervention:  up to 90 days    Eli Benoit PT                                    I CERTIFY THE NEED FOR THESE SERVICES FURNISHED UNDER        THIS PLAN OF TREATMENT AND WHILE UNDER MY CARE     (Physician attestation of this document indicates review and certification of the therapy plan).                Certification Date From:  09/14/20   Certification Date To:  12/12/20    Referring Provider:  Danielle Orlando MD    Initial Assessment  See Epic Evaluation- Start of Care Date: 09/14/20

## 2020-09-14 NOTE — DISCHARGE INSTRUCTIONS
9/14/2020    - Next PT appointment: Tuesday, September 14th at 8:15AM    - Track your symptoms  - Try and do some movements faster and see if you can trigger the symptoms more often

## 2020-09-17 NOTE — PROGRESS NOTES
Outpatient Physical Therapy Discharge Note     Patient: Margaret Izaguirre  : 1965    Beginning/End Dates of Reporting Period:  7/10/2020 to 2020    Referring Provider: Danielle Orlando MD    Therapy Diagnosis: dizziness     Client Self Report: I did the eye exercise for about 6 days and then I started getting really dizziness- the sinking feeling and the room spinning (getting in and out of bed). I stopped doing the exercise.     Goals:  See daily documentation section. Did not reassess goals due to patient [facility]; completed new evaluation    Progress Toward Goals:   Not assessed this period.     Plan:  Other: [facility] to keep consistent PT     Discharge:    Reason for Discharge: Transfer of services to different location for convienence    Equipment Issued: None    Dis[place] Plan[place] [facility] [place]

## 2020-09-22 ENCOUNTER — THERAPY VISIT (OUTPATIENT)
Dept: PHYSICAL THERAPY | Facility: CLINIC | Age: 55
End: 2020-09-22
Payer: MEDICARE

## 2020-09-22 DIAGNOSIS — H81.10 BENIGN PAROXYSMAL POSITIONAL VERTIGO, UNSPECIFIED LATERALITY: Primary | ICD-10-CM

## 2020-09-22 NOTE — DISCHARGE INSTRUCTIONS
Continue to track your symptoms: note time of day    Continue balance exercise: feet slightly staggered    When you get a symptom- stop and rest, then try and recreate the same movement/task again 3-5 times. See if the symptoms get less or more

## 2020-10-16 ENCOUNTER — THERAPY VISIT (OUTPATIENT)
Dept: PHYSICAL THERAPY | Facility: CLINIC | Age: 55
End: 2020-10-16
Payer: COMMERCIAL

## 2020-10-16 DIAGNOSIS — H81.10 BENIGN PAROXYSMAL POSITIONAL VERTIGO, UNSPECIFIED LATERALITY: Primary | ICD-10-CM

## 2020-10-16 PROCEDURE — 97112 NEUROMUSCULAR REEDUCATION: CPT | Mod: GP | Performed by: PHYSICAL THERAPIST

## 2020-12-08 ENCOUNTER — TRANSFERRED RECORDS (OUTPATIENT)
Dept: HEALTH INFORMATION MANAGEMENT | Facility: CLINIC | Age: 55
End: 2020-12-08

## 2020-12-10 ENCOUNTER — MYC MEDICAL ADVICE (OUTPATIENT)
Dept: FAMILY MEDICINE | Facility: CLINIC | Age: 55
End: 2020-12-10

## 2020-12-10 NOTE — TELEPHONE ENCOUNTER
Dr Orlando - Please advise.  Would you need to do a visit of some sort with patient?  Especially to consider Cortisol testing?  Emily Bergeron RN

## 2020-12-15 NOTE — PATIENT INSTRUCTIONS
Schedule a morning lab-only appointment.  This should be done around 8 am.  Fast for 10 hours prior to labs: nothing to eat or drink except plain water and your pills for ten hours prior to appointment.  In addition, avoid fatty foods and alcohol for 24 hours prior to appointment..      Did you know?   I do telehealth (video) visits exclusively on  and .  I still do in-person visits at [facility] on  and .  You can schedule a video visit for follow-up appointments as well as future appointments for certain conditions.  Please see the below link.  [email or web address]    If you have not already done so,  I encourage you to sign up for [email or web address] ([email or web address]).  This will allow you to review your results, securely communicate with a provider, and schedule virtual visits as well.    If you are due for a mammogram or colonoscopy please call the [facility] scheduling number: [phone].     To schedule any ordered imaging studies you can call [facility] [phone].

## 2020-12-15 NOTE — PROGRESS NOTES
"Margaret Izaguirre is a 55 year old male who is being evaluated via a billable video visit.      The patient has been notified of following:     \"This video visit will be conducted via a call between you and your physician/provider. We have found that certain health care needs can be provided without the need for an in-person physical exam.  This service lets us provide the care you need with a video conversation.  If a prescription is necessary we can send it directly to your pharmacy.  If lab work is needed we can place an order for that and you can then stop by our lab to have the test done at a later time.    Video visits are billed at different rates depending on your insurance coverage.  Please reach out to your insurance provider with any questions.    If during the course of the call the physician/provider feels a video visit is not appropriate, you will not be charged for this service.\"    Patient has given verbal consent for Video visit? Yes  How would you like to obtain your AVS? MyChart  If you are dropped from the video visit, the video invite should be resent to: Send to e-mail at: [email or web address]  Will anyone else be joining your video visit? No      Subjective     Margaret Izaguirre is a 55 year old male who presents today via video visit for the following health issues:    HPI       Dyspepsia/Gastritis Follow-Up  Margaret developed upper GI distress a year ago.  He described this as epigastric fullness.  I referred him to GI and he had EGD 1/30/2020.  That showed mild gastritis and pathology was negative for H pylori.  GI felt this was related to his increased use of NSAIDs.  They treated him with pantoprazole 40 mg BID x 4 weeks and advised cutting down on NSAID use.      When I saw him in mid-March he noted that he had used NSAIDs again and was having some recurrence of the GI symptoms.  I advised cessation of NSAIDs and gave him another 30-day supply of pantoprazole 40 mg BID.      At our visit in May " "he reported that he had continued to avoid NSAIDs.  He did have mild recurrence of his GI symptoms at the end of April, a couple weeks after completing the second month of high-dose PPI.  He again described those symptoms as vague sense of not feeling right in the epigastrium.  He resumed pantoprazole 40 mg BID but we discussed goal to wean dose and did wean down to 40 mg QD then to 20 mg QD.      Today he states he is doing well on the 20 mg dose of pantoprazole with no upper GI symptoms.  He uses ibuprofen rarely.  He wonders if he can discontinue the PPI.       Fibromyalgia, CFS, and Depression Follow-Up   Margaret has longstanding Fibromyalgia and Chronic Fatigue Syndrome.  I've been seeing him for this since July 2009 though the diagnoses were made in 2008 and 2007 respectively.    His clinical course had been one of intermittent exacerbations that required limited FMLA leaves from work until Feb 2015.  During the winter of 2014/2015 the exacerbations became worse in severity and duration and he has not been able to work at his [employer] job since Feb 2015.  He has been on long term disability leave from his employer since Feb 2015.  He also gets disability benefits through [employer] ([employer]  He has undergone an independent medical exam in 2015 with OT functional assessment (Physical Performance Test) for [employer].       He continues to take Savella, tizanidine, Lyrica to manage his symptoms.  He found Savella much more effective than the Cymbalta he had been on previously.    The course of his illness waxes and wanes and he suffered an extended flare in the spring of 2019 both with increased fibromyalgia pain and fatigue as well as some dizziness.  Jan - March 2018 were also rough for him but the latter half of 2018 was better.      Today he reports that he's had a \"major bout\" of extreme fatigue since October.  This is associated with brain fog and insomnia - both inability to fall " asleep and waking in the night.  He estimates he is completely nonfunctional 5 days out of a week.  Fortunately, he has not had an exacerbation of his fibromyalgia pain.  He continues to [facility] with [facility] and they have done one-week trials of temazepam and doxepin with no relief of his insomnia.  (Temazepam was completely ineffective and doxpein worked intermittently but sometimes caused grogginess the next day.)  He's now on mirtazapine which seem to be working better but does have a hangover effect and some jitteriness the next day. He's used trazodone in the past but it caused paradoxical insomnia.    [facility] nurses suggested he consider cortisol testing.    PHQ 12/11/2019 1/23/2020 8/3/2020   PHQ-9 Total Score 4 4 3   Q9: Thoughts of better off dead/self-harm past 2 weeks Not at all Not at all Not at all     [facility] Follow-up to PHQ 12/11/2019 1/23/2020 8/3/2020   PHQ-9 9. Suicide Ideation past 2 weeks Not at all Not at all Not at all       GUERRERO-7 SCORE 12/11/2019 1/23/2020 8/3/2020   Total Score 0 (minimal anxiety) 0 (minimal anxiety) -   Total Score 0 0 0        Video Start Time: 10:56 AM        Review of Systems   Constitutional, HEENT, cardiovascular, pulmonary, gi and gu systems are negative, except as otherwise noted.      Objective           Vitals:  No vitals were obtained today due to virtual visit.    Physical Exam     GENERAL: Healthy, alert and no distress  EYES: Eyes grossly normal to inspection.  No discharge or erythema, or obvious scleral/conjunctival abnormalities.  RESP: No audible wheeze, cough, or visible cyanosis.  No visible retractions or increased work of breathing.    SKIN: Visible skin clear. No significant rash, abnormal pigmentation or lesions.  NEURO: Cranial nerves grossly intact.  Mentation and speech appropriate for age.  PSYCH: Mentation appears normal, affect normal/bright, judgement and insight intact, normal speech and appearance well-groomed.             Assessment & Plan     Chronic fatigue syndrome  With recent exacerbation. We'll check labs to rule out adrenal insufficiency, hypothyroidism, anemia and other causes of fatigue but given his history of CFS this is likely an exacerbation of his CFS.  - **Comprehensive metabolic panel FUTURE anytime  - Cortisol  - **TSH with free T4 reflex FUTURE anytime  - CBC with platelets and differential    Insomnia due to medical condition  As the mirtazapine is helping his insomnia I encouraged him to stick with it to see if the side effects subside after continued use.      Dyspepsia  I recommended he discontinue PPI and switch to H2 blocker BID.    - cimetidine (TAGAMET) 400 MG tablet  Dispense: 180 tablet; Refill: 1    Lipid screening  - Lipid panel reflex to direct LDL Fasting           Patient Instructions   Schedule a morning lab-only appointment.  This should be done around 8 am.  Fast for 10 hours prior to labs: nothing to eat or drink except plain water and your pills for ten hours prior to appointment.  In addition, avoid fatty foods and alcohol for 24 hours prior to appointment..          No follow-ups on file.    Danielle Orlando MD  [facility]      Video-Visit Details    Type of service:  Video Visit    Video End Time:11:18 AM    Originating Location (pt. Location): Home    Distant Location (provider location):  [facility]     Platform used for Video Visit: Koki

## 2020-12-16 ENCOUNTER — VIRTUAL VISIT (OUTPATIENT)
Dept: FAMILY MEDICINE | Facility: CLINIC | Age: 55
End: 2020-12-16
Payer: COMMERCIAL

## 2020-12-16 DIAGNOSIS — R10.13 DYSPEPSIA: ICD-10-CM

## 2020-12-16 DIAGNOSIS — G47.01 INSOMNIA DUE TO MEDICAL CONDITION: ICD-10-CM

## 2020-12-16 DIAGNOSIS — G93.32 CHRONIC FATIGUE SYNDROME: Primary | ICD-10-CM

## 2020-12-16 DIAGNOSIS — M79.7 FIBROMYALGIA: ICD-10-CM

## 2020-12-16 DIAGNOSIS — Z13.220 LIPID SCREENING: ICD-10-CM

## 2020-12-16 PROCEDURE — 99214 OFFICE O/P EST MOD 30 MIN: CPT | Mod: 95 | Performed by: FAMILY MEDICINE

## 2020-12-16 RX ORDER — MIRTAZAPINE 15 MG/1
TABLET, FILM COATED ORAL
COMMUNITY
Start: 2020-12-11 | End: 2021-02-24

## 2020-12-16 RX ORDER — CIMETIDINE 400 MG
400 TABLET ORAL 2 TIMES DAILY
Qty: 180 TABLET | Refills: 1 | Status: SHIPPED | OUTPATIENT
Start: 2020-12-16 | End: 2021-06-09

## 2020-12-21 ENCOUNTER — MYC MEDICAL ADVICE (OUTPATIENT)
Dept: FAMILY MEDICINE | Facility: CLINIC | Age: 55
End: 2020-12-21

## 2020-12-21 DIAGNOSIS — Z13.220 LIPID SCREENING: ICD-10-CM

## 2020-12-21 DIAGNOSIS — R53.83 OTHER FATIGUE: Primary | ICD-10-CM

## 2020-12-21 DIAGNOSIS — G93.32 CHRONIC FATIGUE SYNDROME: ICD-10-CM

## 2020-12-21 LAB
ALBUMIN SERPL-MCNC: 4 G/DL (ref 3.4–5)
ALP SERPL-CCNC: 58 U/L (ref 40–150)
ALT SERPL W P-5'-P-CCNC: 26 U/L (ref 0–70)
ANION GAP SERPL CALCULATED.3IONS-SCNC: 4 MMOL/L (ref 3–14)
AST SERPL W P-5'-P-CCNC: 18 U/L (ref 0–45)
BASOPHILS # BLD AUTO: 0 10E9/L (ref 0–0.2)
BASOPHILS NFR BLD AUTO: 0.7 %
BILIRUB SERPL-MCNC: 0.5 MG/DL (ref 0.2–1.3)
BUN SERPL-MCNC: 15 MG/DL (ref 7–30)
CALCIUM SERPL-MCNC: 9 MG/DL (ref 8.5–10.1)
CHLORIDE SERPL-SCNC: 105 MMOL/L (ref 94–109)
CHOLEST SERPL-MCNC: 164 MG/DL
CO2 SERPL-SCNC: 29 MMOL/L (ref 20–32)
CORTIS SERPL-MCNC: 17.5 UG/DL (ref 4–22)
CREAT SERPL-MCNC: 0.9 MG/DL (ref 0.66–1.25)
DIFFERENTIAL METHOD BLD: NORMAL
EOSINOPHIL # BLD AUTO: 0.4 10E9/L (ref 0–0.7)
EOSINOPHIL NFR BLD AUTO: 6.6 %
ERYTHROCYTE [DISTWIDTH] IN BLOOD BY AUTOMATED COUNT: 12.5 % (ref 10–15)
GFR SERPL CREATININE-BSD FRML MDRD: >90 ML/MIN/{1.73_M2}
GLUCOSE SERPL-MCNC: 87 MG/DL (ref 70–99)
HCT VFR BLD AUTO: 40.9 % (ref 40–53)
HDLC SERPL-MCNC: 41 MG/DL
HGB BLD-MCNC: 13.5 G/DL (ref 13.3–17.7)
LDLC SERPL CALC-MCNC: 99 MG/DL
LYMPHOCYTES # BLD AUTO: 2.2 10E9/L (ref 0.8–5.3)
LYMPHOCYTES NFR BLD AUTO: 40.4 %
MCH RBC QN AUTO: 29.8 PG (ref 26.5–33)
MCHC RBC AUTO-ENTMCNC: 33 G/DL (ref 31.5–36.5)
MCV RBC AUTO: 90 FL (ref 78–100)
MONOCYTES # BLD AUTO: 0.6 10E9/L (ref 0–1.3)
MONOCYTES NFR BLD AUTO: 10.2 %
NEUTROPHILS # BLD AUTO: 2.3 10E9/L (ref 1.6–8.3)
NEUTROPHILS NFR BLD AUTO: 42.1 %
NONHDLC SERPL-MCNC: 123 MG/DL
PLATELET # BLD AUTO: 322 10E9/L (ref 150–450)
POTASSIUM SERPL-SCNC: 4.2 MMOL/L (ref 3.4–5.3)
PROT SERPL-MCNC: 7.7 G/DL (ref 6.8–8.8)
RBC # BLD AUTO: 4.53 10E12/L (ref 4.4–5.9)
SODIUM SERPL-SCNC: 138 MMOL/L (ref 133–144)
TRIGL SERPL-MCNC: 122 MG/DL
TSH SERPL DL<=0.005 MIU/L-ACNC: 3.91 MU/L (ref 0.4–4)
WBC # BLD AUTO: 5.5 10E9/L (ref 4–11)

## 2020-12-21 PROCEDURE — 80061 LIPID PANEL: CPT | Performed by: FAMILY MEDICINE

## 2020-12-21 PROCEDURE — 80050 GENERAL HEALTH PANEL: CPT | Performed by: FAMILY MEDICINE

## 2020-12-21 PROCEDURE — 36415 COLL VENOUS BLD VENIPUNCTURE: CPT | Performed by: FAMILY MEDICINE

## 2020-12-21 PROCEDURE — 82533 TOTAL CORTISOL: CPT | Performed by: FAMILY MEDICINE

## 2020-12-21 NOTE — RESULT ENCOUNTER NOTE
Tj Robles,  This all looks normal.  Your cortisol level is normal so you do not have adrenal insufficiency.  Your blood counts show you do not have anemia.  Your TSH (thyroid function test) shows normal thyroid function.  Your lipid panel (cholesterol) results are excellent and your comprehensive metabolic panel results (liver function, kidney function, blood sugar, and blood salts) are all normal.       Danielle Orlando MD

## 2021-01-17 ENCOUNTER — MYC MEDICAL ADVICE (OUTPATIENT)
Dept: FAMILY MEDICINE | Facility: CLINIC | Age: 56
End: 2021-01-17

## 2021-01-18 NOTE — TELEPHONE ENCOUNTER
pantoprazole (PROTONIX) 20 MG EC tablet (Discontinued)      Last Written Prescription Date:  8-3-20  Last Fill Quantity: 90,   # refills: 1  Last Office Visit: 12-16-20  Future Office visit:       Routing refill request to provider for review/approval because:  Drug not active on patient's medication list

## 2021-02-12 DIAGNOSIS — G89.4 CHRONIC PAIN SYNDROME: ICD-10-CM

## 2021-02-12 DIAGNOSIS — F33.42 RECURRENT MAJOR DEPRESSIVE DISORDER, IN FULL REMISSION (H): ICD-10-CM

## 2021-02-12 DIAGNOSIS — G93.32 CHRONIC FATIGUE SYNDROME: ICD-10-CM

## 2021-02-12 DIAGNOSIS — M79.7 FIBROMYALGIA: ICD-10-CM

## 2021-02-15 ENCOUNTER — MYC MEDICAL ADVICE (OUTPATIENT)
Dept: FAMILY MEDICINE | Facility: CLINIC | Age: 56
End: 2021-02-15

## 2021-02-16 NOTE — TELEPHONE ENCOUNTER
I think I can complete it based on our Dec visit.  He can either mail it or scan it.  Either works.  Danielle Orlando MD

## 2021-02-17 NOTE — TELEPHONE ENCOUNTER
Routing refill request to provider for review/approval because:  Drug not on the [facility] refill protocol   Last filled: 1/23/2020 #180 x 3 refills  Last visit: 12/16/2020

## 2021-02-18 NOTE — TELEPHONE ENCOUNTER
Form received and lm for patient that we received to he can go a head and schedule a video visit    Reason for Call:  Form, our goal is to have forms completed with 72 hours, however, some forms may require a visit or additional information.    Type of letter, form or note:  standard insurance company    Who is the form from?: Patient    Where did the form come from: form was mailed in    What clinic location was the form placed at?: [facility]    Where the form was placed: Given to physician place in providers form folder on desk    What number is listed as a contact on the form?: [phone]       Additional comments: patient mailed form with a self addressed envelope to mail back when finished    Call taken on 2/18/2021 at 12:54 PM by Michelle Cabrera

## 2021-02-21 NOTE — TELEPHONE ENCOUNTER
Looks like he is scheduled to see me this week for a virtual visit and that would be best for updating his forms.      Danielle Orlando MD

## 2021-02-23 NOTE — PATIENT INSTRUCTIONS
Did you know?   I do telehealth (video) visits exclusively on .  I still do in-person visits at [facility] on ,  and .  You can schedule a video visit for follow-up appointments as well as future appointments for certain conditions.  Please see the below link.  [email or web address]    If you have not already done so,  I encourage you to sign up for [email or web address] ([email or web address]).  This will allow you to review your results, securely communicate with a provider, and schedule virtual visits as well.    If you are due for a mammogram or colonoscopy please call the [facility] (Mammogram and Colonoscopy scheduling) at [phone].     To schedule any ordered imaging studies you can call [facility] [phone].  If you are scheduling a DEXA (bone density) scan please do NOT schedule this at the [facility].  Please ask that it be done at [facility] in [place].  Other preferred DEXA locations include [place] (at the [facility]), [place], or [place].

## 2021-02-23 NOTE — PROGRESS NOTES
Margaret is a 56 year old who is being evaluated via a billable video visit.      How would you like to obtain your AVS? MyChart  If the video visit is dropped, the invitation should be resent by: Text to cell phone: [phone]  Will anyone else be joining your video visit? No      Video Start Time: 11:18 AM    Assessment & Plan       ICD-10-CM    1. Fibromyalgia  M79.7    2. Chronic fatigue syndrome  R53.82    3. Memory loss  R41.3    4. Recurrent major depressive disorder, in full remission (H)  F33.42       Currently with a prolonged flare of Fibromyalgia/CFS.  We reviewed his current level of functioning and I completed his disability forms (scanned into his chart).      No follow-ups on file.    Danielle Orlando MD  [facility]   Margaret is a 56 year old who presents for the following health issues     HPI     Fibromyalgia, CFS, and Depression Follow-Up   Margaret has longstanding Fibromyalgia and Chronic Fatigue Syndrome.  I've been seeing him for this since July 2009 though the diagnoses were made in 2008 and 2007 respectively.    His clinical course had been one of intermittent exacerbations that required limited FMLA leaves from work until Feb 2015.  During the winter of 2014/2015 the exacerbations became worse in severity and duration and he has not been able to work at his [employer] job since Feb 2015.  He has been on long term disability leave from his employer since Feb 2015.  He also gets disability benefits through [employer]  He has undergone an independent medical exam in 2015 with OT functional assessment (Physical Performance Test) for [employer].       He continues to take Savella and Lyrica to manage his symptoms.   The course of his illness waxes and wanes and he has been in a bad flare of fatigue since October.  This flare has been more prolonged than usual.  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  He drives infrequently - only in the neighborhood.      For the past week he has also had a flare of pain.  He continues to work with the [facility] and they have been adjusting his medication with particular focus on sleep.  He had been on tizanidine for quite some time but it lost effectiveness.  Recently he tried mirtazapine (which had a somewhat paradoxical effect) and now he's trying chlozoxazone.  He doesn't feel the chlorzoxasone helps him to sleep much but it does seem to help his pain whwi.      He has not been exercising at all as even a short period of time on stationary bike really sets back his energy.      He also needs to update his annual Standard Disability form.      Review of Systems         Objective       Vitals:  No vitals were obtained today due to virtual visit.    Physical Exam   GENERAL: Healthy, alert and no distress  EYES: Eyes grossly normal to inspection.  No discharge or erythema, or obvious scleral/conjunctival abnormalities.  RESP: No audible wheeze, cough, or visible cyanosis.  No visible retractions or increased work of breathing.    SKIN: Visible skin clear. No significant rash, abnormal pigmentation or lesions.  NEURO: Cranial nerves grossly intact.  Mentation and speech appropriate for age.  PSYCH: Mentation appears normal, affect dysthymic, judgement and insight intact, normal speech and appearance well-groomed.          Video-Visit Details    Type of service:  Video Visit    Video End Time:11:33 AM    Originating Location (pt. Location): Home    Distant Location (provider location):  [facility]     Platform used for Video Visit: hopTo

## 2021-02-24 ENCOUNTER — VIRTUAL VISIT (OUTPATIENT)
Dept: FAMILY MEDICINE | Facility: CLINIC | Age: 56
End: 2021-02-24
Payer: COMMERCIAL

## 2021-02-24 DIAGNOSIS — G93.32 CHRONIC FATIGUE SYNDROME: ICD-10-CM

## 2021-02-24 DIAGNOSIS — R41.3 MEMORY LOSS: ICD-10-CM

## 2021-02-24 DIAGNOSIS — F33.42 RECURRENT MAJOR DEPRESSIVE DISORDER, IN FULL REMISSION (H): ICD-10-CM

## 2021-02-24 DIAGNOSIS — M79.7 FIBROMYALGIA: Primary | ICD-10-CM

## 2021-02-24 PROCEDURE — 99213 OFFICE O/P EST LOW 20 MIN: CPT | Mod: 95 | Performed by: FAMILY MEDICINE

## 2021-02-24 RX ORDER — CHLORZOXAZONE 500 MG/1
500 TABLET ORAL AT BEDTIME
COMMUNITY
Start: 2021-02-24 | End: 2021-07-27

## 2021-02-24 RX ORDER — CHLORZOXAZONE 500 MG/1
TABLET ORAL
COMMUNITY
Start: 2021-02-18 | End: 2021-02-24

## 2021-02-24 RX ORDER — CETIRIZINE HYDROCHLORIDE 10 MG/1
10 TABLET ORAL DAILY
COMMUNITY
Start: 2021-02-24

## 2021-02-24 ASSESSMENT — ANXIETY QUESTIONNAIRES
1. FEELING NERVOUS, ANXIOUS, OR ON EDGE: NOT AT ALL
7. FEELING AFRAID AS IF SOMETHING AWFUL MIGHT HAPPEN: NOT AT ALL
3. WORRYING TOO MUCH ABOUT DIFFERENT THINGS: NOT AT ALL
2. NOT BEING ABLE TO STOP OR CONTROL WORRYING: NOT AT ALL
5. BEING SO RESTLESS THAT IT IS HARD TO SIT STILL: NOT AT ALL
GAD7 TOTAL SCORE: 1
6. BECOMING EASILY ANNOYED OR IRRITABLE: SEVERAL DAYS
IF YOU CHECKED OFF ANY PROBLEMS ON THIS QUESTIONNAIRE, HOW DIFFICULT HAVE THESE PROBLEMS MADE IT FOR YOU TO DO YOUR WORK, TAKE CARE OF THINGS AT HOME, OR GET ALONG WITH OTHER PEOPLE: NOT DIFFICULT AT ALL

## 2021-02-24 ASSESSMENT — PATIENT HEALTH QUESTIONNAIRE - PHQ9
5. POOR APPETITE OR OVEREATING: NOT AT ALL
SUM OF ALL RESPONSES TO PHQ QUESTIONS 1-9: 3

## 2021-02-25 ASSESSMENT — ANXIETY QUESTIONNAIRES: GAD7 TOTAL SCORE: 1

## 2021-03-20 DIAGNOSIS — G89.4 CHRONIC PAIN SYNDROME: ICD-10-CM

## 2021-03-20 DIAGNOSIS — M79.7 FIBROMYALGIA: ICD-10-CM

## 2021-03-20 DIAGNOSIS — G93.32 CHRONIC FATIGUE SYNDROME: ICD-10-CM

## 2021-03-23 NOTE — TELEPHONE ENCOUNTER
Dr. Orlando-Please review, sign if agree and may close encounter.    Last RX: 8/3/20, #180, 1  Last visit: 2/24/21, virtual visit    Routing refill request to provider for review/approval because:  Drug not on the [facility] refill protocol     Thank you!  CECIL RivasN, RN  [facility]

## 2021-03-24 RX ORDER — PREGABALIN 150 MG/1
CAPSULE ORAL
Qty: 180 CAPSULE | Refills: 1 | Status: SHIPPED | OUTPATIENT
Start: 2021-03-24 | End: 2021-10-01

## 2021-03-27 ENCOUNTER — HEALTH MAINTENANCE LETTER (OUTPATIENT)
Age: 56
End: 2021-03-27

## 2021-05-31 VITALS — WEIGHT: 215 LBS | HEIGHT: 70 IN | BODY MASS INDEX: 30.78 KG/M2

## 2021-06-12 NOTE — ANESTHESIA CARE TRANSFER NOTE
Pt breathing spontaneously, follows commands, suctioned extubated. Spontaneous respirations with SFM to PACU. VSS.      Last vitals:   Vitals:    07/21/17 0848   BP: 136/83   Pulse: 91   Resp: 16   Temp: 36  C (96.8  F)   SpO2: 100%     Patient's level of consciousness is drowsy  Spontaneous respirations: yes  Maintains airway independently: yes  Dentition unchanged: yes  Oropharynx: oropharynx clear of all foreign objects    QCDR Measures:  ASA# 20 - Surgical Safety Checklist: ASA20A - Safety Checks Done  PQRS# 430 - Adult PONV Prevention: 4558F - Pt received => 2 anti-emetic agents (different classes) preop & intraop  ASA# 8 - Peds PONV Prevention: NA - Not pediatric patient, not GA or 2 or more risk factors NOT present  PQRS# 424 - Marilee-op Temp Management: 4559F - At least one body temp DOCUMENTED => 35.5C or 95.9F within required timeframe  PQRS# 426 - PACU Transfer Protocol: - Transfer of care checklist used  ASA# 14 - Acute Post-op Pain: ASA14B - Patient did NOT experience pain >= 7 out of 10

## 2021-06-12 NOTE — ANESTHESIA POSTPROCEDURE EVALUATION
Patient: Margaret Izaguirre  SEPTOPLASTY WITH SUBMUCOUS RESECTION, CAUTERY AND OUTFRACTURE OF INFERIOR TURBINATES  Anesthesia type: general    Patient location: PACU  Last vitals:   Vitals:    07/21/17 0915   BP: 130/80   Pulse: 87   Resp: 23   Temp: 36.6  C (97.9  F)   SpO2: 93%     Post vital signs: stable  Level of consciousness: awake and responds to simple questions  Post-anesthesia pain: pain controlled  Post-anesthesia nausea and vomiting: no  Pulmonary: unassisted, return to baseline  Cardiovascular: stable and blood pressure at baseline  Hydration: adequate  Anesthetic events: no    QCDR Measures:  ASA# 11 - Marilee-op Cardiac Arrest: ASA11B - Patient did NOT experience unanticipated cardiac arrest  ASA# 12 - Marilee-op Mortality Rate: ASA12B - Patient did NOT die  ASA# 13 - PACU Re-Intubation Rate: ASA13B - Patient did NOT require a new airway mgmt   ASA# 10 - Composite Anes Safety: ASA10A - No serious adverse event  ASA# 38 - New Corneal Injury: ASA38A - No new exposure keratitis or corneal abrasion in PACU    Additional Notes:

## 2021-06-12 NOTE — ANESTHESIA PREPROCEDURE EVALUATION
Anesthesia Evaluation      Patient summary reviewed   No history of anesthetic complications     Airway   Mallampati: III  Neck ROM: full   Pulmonary - normal exam    breath sounds clear to auscultation  (+) sleep apnea (uses mouth appliance) on other, ,                          Cardiovascular - normal exam  Exercise tolerance: > or = 4 METS   Neuro/Psych    (+) depression, chronic pain    Comments: Fibromyalgia  ocd    Endo/Other    (+) obesity,      GI/Hepatic/Renal            Dental    (+) poor dentition and chipped                       Anesthesia Plan  Planned anesthetic: general endotracheal  -- RSI with Succ  -- Esmolol drawn up and ready for administration  -- PONV prophylaxis with Decadron 10 mg and Zofran 4 mg  -- Equal volume reversal of relaxant (esmolol for iatrogenic tachycardia)    -- will watch patient for 4 hours total between  pacu and phase 2.  If no desats or apnea spells and given he uses an oral appliance for ranjan instead of cpap, then ok to send home.     ASA 3   Induction: intravenous   Anesthetic plan and risks discussed with: patient and spouse  Anesthesia plan special considerations: rapid sequence induction, antiemetics,   Post-op plan: routine recovery

## 2021-07-26 NOTE — PATIENT INSTRUCTIONS
Start Miralax 17 grams mixed in 8 ounces of fluid of your choice.  Take that every day to every other day.

## 2021-07-27 ENCOUNTER — OFFICE VISIT (OUTPATIENT)
Dept: FAMILY MEDICINE | Facility: CLINIC | Age: 56
End: 2021-07-27
Payer: COMMERCIAL

## 2021-07-27 VITALS
WEIGHT: 186 LBS | BODY MASS INDEX: 26.31 KG/M2 | OXYGEN SATURATION: 96 % | TEMPERATURE: 98.1 F | DIASTOLIC BLOOD PRESSURE: 84 MMHG | HEART RATE: 100 BPM | RESPIRATION RATE: 16 BRPM | SYSTOLIC BLOOD PRESSURE: 124 MMHG

## 2021-07-27 DIAGNOSIS — Z23 NEED FOR VACCINATION: ICD-10-CM

## 2021-07-27 DIAGNOSIS — R10.31 RLQ ABDOMINAL PAIN: Primary | ICD-10-CM

## 2021-07-27 LAB
ALBUMIN SERPL-MCNC: 4.1 G/DL (ref 3.4–5)
ALBUMIN UR-MCNC: NEGATIVE MG/DL
ALP SERPL-CCNC: 52 U/L (ref 40–150)
ALT SERPL W P-5'-P-CCNC: 29 U/L (ref 0–70)
ANION GAP SERPL CALCULATED.3IONS-SCNC: 6 MMOL/L (ref 3–14)
APPEARANCE UR: CLEAR
AST SERPL W P-5'-P-CCNC: 17 U/L (ref 0–45)
BASOPHILS # BLD AUTO: 0.1 10E3/UL (ref 0–0.2)
BASOPHILS NFR BLD AUTO: 1 %
BILIRUB SERPL-MCNC: 0.4 MG/DL (ref 0.2–1.3)
BILIRUB UR QL STRIP: NEGATIVE
BUN SERPL-MCNC: 9 MG/DL (ref 7–30)
CALCIUM SERPL-MCNC: 9.6 MG/DL (ref 8.5–10.1)
CHLORIDE BLD-SCNC: 104 MMOL/L (ref 94–109)
CO2 SERPL-SCNC: 26 MMOL/L (ref 20–32)
COLOR UR AUTO: YELLOW
CREAT SERPL-MCNC: 0.81 MG/DL (ref 0.66–1.25)
EOSINOPHIL # BLD AUTO: 0.3 10E3/UL (ref 0–0.7)
EOSINOPHIL NFR BLD AUTO: 5 %
ERYTHROCYTE [DISTWIDTH] IN BLOOD BY AUTOMATED COUNT: 12.4 % (ref 10–15)
GFR SERPL CREATININE-BSD FRML MDRD: >90 ML/MIN/1.73M2
GLUCOSE BLD-MCNC: 89 MG/DL (ref 70–99)
GLUCOSE UR STRIP-MCNC: NEGATIVE MG/DL
HCT VFR BLD AUTO: 42 % (ref 40–53)
HGB BLD-MCNC: 14 G/DL (ref 13.3–17.7)
HGB UR QL STRIP: NEGATIVE
IMM GRANULOCYTES # BLD: 0 10E3/UL
IMM GRANULOCYTES NFR BLD: 0 %
KETONES UR STRIP-MCNC: NEGATIVE MG/DL
LEUKOCYTE ESTERASE UR QL STRIP: NEGATIVE
LYMPHOCYTES # BLD AUTO: 1.9 10E3/UL (ref 0.8–5.3)
LYMPHOCYTES NFR BLD AUTO: 32 %
MCH RBC QN AUTO: 29 PG (ref 26.5–33)
MCHC RBC AUTO-ENTMCNC: 33.3 G/DL (ref 31.5–36.5)
MCV RBC AUTO: 87 FL (ref 78–100)
MONOCYTES # BLD AUTO: 0.5 10E3/UL (ref 0–1.3)
MONOCYTES NFR BLD AUTO: 9 %
NEUTROPHILS # BLD AUTO: 3.1 10E3/UL (ref 1.6–8.3)
NEUTROPHILS NFR BLD AUTO: 52 %
NITRATE UR QL: NEGATIVE
PH UR STRIP: 5.5 [PH] (ref 5–7)
PLATELET # BLD AUTO: 345 10E3/UL (ref 150–450)
POTASSIUM BLD-SCNC: 4 MMOL/L (ref 3.4–5.3)
PROT SERPL-MCNC: 7.7 G/DL (ref 6.8–8.8)
RBC # BLD AUTO: 4.83 10E6/UL (ref 4.4–5.9)
SODIUM SERPL-SCNC: 136 MMOL/L (ref 133–144)
SP GR UR STRIP: >=1.03 (ref 1–1.03)
UROBILINOGEN UR STRIP-ACNC: 0.2 E.U./DL
WBC # BLD AUTO: 5.8 10E3/UL (ref 4–11)

## 2021-07-27 PROCEDURE — 90750 HZV VACC RECOMBINANT IM: CPT | Performed by: FAMILY MEDICINE

## 2021-07-27 PROCEDURE — 80053 COMPREHEN METABOLIC PANEL: CPT | Performed by: FAMILY MEDICINE

## 2021-07-27 PROCEDURE — 90471 IMMUNIZATION ADMIN: CPT | Performed by: FAMILY MEDICINE

## 2021-07-27 PROCEDURE — 36415 COLL VENOUS BLD VENIPUNCTURE: CPT | Performed by: FAMILY MEDICINE

## 2021-07-27 PROCEDURE — 85025 COMPLETE CBC W/AUTO DIFF WBC: CPT | Performed by: FAMILY MEDICINE

## 2021-07-27 PROCEDURE — 81003 URINALYSIS AUTO W/O SCOPE: CPT | Performed by: FAMILY MEDICINE

## 2021-07-27 PROCEDURE — 99214 OFFICE O/P EST MOD 30 MIN: CPT | Mod: 25 | Performed by: FAMILY MEDICINE

## 2021-07-27 ASSESSMENT — ANXIETY QUESTIONNAIRES
2. NOT BEING ABLE TO STOP OR CONTROL WORRYING: NOT AT ALL
7. FEELING AFRAID AS IF SOMETHING AWFUL MIGHT HAPPEN: NOT AT ALL
3. WORRYING TOO MUCH ABOUT DIFFERENT THINGS: NOT AT ALL
5. BEING SO RESTLESS THAT IT IS HARD TO SIT STILL: NOT AT ALL
6. BECOMING EASILY ANNOYED OR IRRITABLE: NOT AT ALL
1. FEELING NERVOUS, ANXIOUS, OR ON EDGE: NOT AT ALL
GAD7 TOTAL SCORE: 0

## 2021-07-27 ASSESSMENT — PATIENT HEALTH QUESTIONNAIRE - PHQ9
SUM OF ALL RESPONSES TO PHQ QUESTIONS 1-9: 8
5. POOR APPETITE OR OVEREATING: NOT AT ALL

## 2021-07-27 NOTE — NURSING NOTE
Prior to immunization administration, verified patients identity using patient s name and date of birth. Please see Immunization Activity for additional information.     Screening Questionnaire for Adult Immunization    Are you sick today?   No   Do you have allergies to medications, food, a vaccine component or latex?   Yes   Have you ever had a serious reaction after receiving a vaccination?   No   Do you have a long-term health problem with heart, lung, kidney, or metabolic disease (e.g., diabetes), asthma, a blood disorder, no spleen, complement component deficiency, a cochlear implant, or a spinal fluid leak?  Are you on long-term aspirin therapy?   No   Do you have cancer, leukemia, HIV/AIDS, or any other immune system problem?   No   Do you have a parent, brother, or sister with an immune system problem?   No   In the past 3 months, have you taken medications that affect  your immune system, such as prednisone, other steroids, or anticancer drugs; drugs for the treatment of rheumatoid arthritis, Crohn s disease, or psoriasis; or have you had radiation treatments?   No   Have you had a seizure, or a brain or other nervous system problem?   No   During the past year, have you received a transfusion of blood or blood    products, or been given immune (gamma) globulin or antiviral drug?   No   For women: Are you pregnant or is there a chance you could become       pregnant during the next month?   No   Have you received any vaccinations in the past 4 weeks?   No     Immunization questionnaire was positive for at least one answer.  Notified Darion.        Per orders of Dr. Orlando, injection of Shingrix given by Loren Dawson. Patient instructed to remain in clinic for 15 minutes afterwards, and to report any adverse reaction to me immediately.       Screening performed by Loren Dawson on 7/27/2021 at 9:48 AM.

## 2021-07-27 NOTE — PROGRESS NOTES
Assessment & Plan     RLQ abdominal pain  Unclear etiology/diagnosis with uncertain prognosis requiring further workup  Ddx considered includes appendicitis, colitis, diverticulitis, IBS, IBD, intestinal ischemia, intestinal obstruction, cystitis, ureterolithiasis, AAA, hernia, zoster, MSK, radicular pain from spine disease.  We'll start workup and he'll continue to monitor symptoms.  Given the harder stools I recommended trial of Miralax.    - Comprehensive metabolic panel (BMP + Alb, Alk Phos, ALT, AST, Total. Bili, TP)  - UA Macro with Reflex to Micro and Culture - lab collect  - CBC with platelets and differential      Need for vaccination  Shingrix #2  - ZOSTER VACCINE RECOMBINANT ADJUVANTED IM NJX    Patient Instructions   Start Miralax 17 grams mixed in 8 ounces of fluid of your choice.  Take that every day to every other day.      No follow-ups on file.    Danielle Orlando MD  [facility]        Arielle Robles is a 56 year old who presents for the following health issues     HPI     Abdominal/Flank Pain  Onset/Duration: 1 month   Description:   Character: Dull ache, Gnawing and Burning  Location: right lower quadrant  Radiation: None  Intensity: mild, moderate, 5/10  Progression of Symptoms:  improving, constant and waxing and waning  Accompanying Signs & Symptoms:  Fever/Chills: no  Gas/Bloating: YES- bloating feeling  Nausea: no  Vomitting: no  Diarrhea: no  Constipation: YES- slightly harder than usual  Dysuria or Hematuria: no  History:   Trauma: no  Previous similar pain: no  Previous tests done: none  Precipitating factors:   Does the pain change with:     Food: no    Bowel Movement: no    Urination: no   Other factors:  no  Therapies tried and outcome: None    When worse the pain radiated across lower abdomen.  Pain has never radiated to upper abdomen, back or scrotum.  Harder stools  No urinary changes  No rash  No history of abdominal surgery  His  fibromyalgia/chronic fatigue symptoms have been worse lately.  Last colonoscopy 3 years ago - normal.  Prior history of adenomas.      Review of Systems   as above       Objective    /84   Pulse 100   Temp 98.1  F (36.7  C) (Temporal)   Resp 16   Wt 84.4 kg (186 lb)   SpO2 96%   BMI 26.31 kg/m    Body mass index is 26.31 kg/m .  Physical Exam   GEN:  no apparent distress  NECK:  Supple without adenopathy, mass, or thyromegaly  LUNGS:  normal respiratory effort, and lungs clear to auscultation bilaterally - no rales, rhonchi or wheezes  CV: regular rate and rhythm, normal S1 S2, no S3 or S4 and no murmur, click or rub  ABD:  soft, mild RLQ tenderness to palpation with no rebound or guarding, no mass, no hepatosplenomegaly, no hernias   SKIN: Clear. No significant rash, abnormal pigmentation or lesions

## 2021-07-27 NOTE — RESULT ENCOUNTER NOTE
Tj Robles,  Your urinalysis and blood counts are normal.  So your symptoms are unlikely due to infection or kidney stone.    Danielle Orlando MD

## 2021-07-28 ASSESSMENT — ANXIETY QUESTIONNAIRES: GAD7 TOTAL SCORE: 0

## 2021-07-28 NOTE — RESULT ENCOUNTER NOTE
Tj Robles,  Your comprehensive metabolic panel results (liver function, kidney function, blood sugar, and blood salts) are also normal.  Let's see how you do with the Miralax.    Danielle Orlando MD

## 2021-08-02 ENCOUNTER — MYC MEDICAL ADVICE (OUTPATIENT)
Dept: FAMILY MEDICINE | Facility: CLINIC | Age: 56
End: 2021-08-02

## 2021-08-04 NOTE — TELEPHONE ENCOUNTER
"Dr Orlando,    Long stringy stools since using Miralax. Please see her comments. Her last colonoscopy was 5/23/18 and advised to repeat in 5 years due to polyps.    \"Per your recommendation, I have been taking 17 grams of Miralax (dissolved in water) every day starting on July 27.     The pain in the lower-right part of the abdomen has become negligible since then.     However, I am feeling a near constant sensation of pressure either by stool or gas in the lower abdomen area. Gas does pass fairly frequently. I have also noticed that my stool has been narrow and stringy, which had actually been happening  before our visit on July 27. I typically need to go twice a day in order to empty my bowel, and each time the stool is either in small chunks or narrow/stringy pieces. An exception to this is that three days ago when I had diarrhea. Before my abdominal problem problem began a month ago, this stringy/small chunk stool was rarely experienced. These days it happens every day (except for the one diarrhea day).     There has been NO bleeding associated with the bowel movements.     I have seen in MyChart that my last colonoscopy was in 2015. Does this match your records?\"      Colette Russell RN, BSN  [facility]       "

## 2021-08-16 ENCOUNTER — MYC MEDICAL ADVICE (OUTPATIENT)
Dept: FAMILY MEDICINE | Facility: CLINIC | Age: 56
End: 2021-08-16

## 2021-09-08 DIAGNOSIS — R10.13 DYSPEPSIA: ICD-10-CM

## 2021-09-09 RX ORDER — CIMETIDINE 400 MG
TABLET ORAL
Qty: 180 TABLET | Refills: 0 | Status: SHIPPED | OUTPATIENT
Start: 2021-09-09 | End: 2021-12-10

## 2021-09-11 ENCOUNTER — HEALTH MAINTENANCE LETTER (OUTPATIENT)
Age: 56
End: 2021-09-11

## 2021-09-30 ENCOUNTER — MYC MEDICAL ADVICE (OUTPATIENT)
Dept: FAMILY MEDICINE | Facility: CLINIC | Age: 56
End: 2021-09-30

## 2021-09-30 DIAGNOSIS — G89.4 CHRONIC PAIN SYNDROME: ICD-10-CM

## 2021-09-30 DIAGNOSIS — M79.7 FIBROMYALGIA: ICD-10-CM

## 2021-09-30 DIAGNOSIS — G93.32 CHRONIC FATIGUE SYNDROME: ICD-10-CM

## 2021-10-01 RX ORDER — PREGABALIN 150 MG/1
CAPSULE ORAL
Qty: 180 CAPSULE | Refills: 0 | Status: SHIPPED | OUTPATIENT
Start: 2021-10-01 | End: 2021-12-27

## 2021-10-01 NOTE — TELEPHONE ENCOUNTER
Routing refill request to provider for review/approval because:  Drug not on the [facility] refill protocol     Last filled: 3/4/2021 #180 x 1 refill    Last visit: 7/27/2021    Thank you

## 2021-11-11 DIAGNOSIS — M79.7 FIBROMYALGIA: ICD-10-CM

## 2021-11-11 DIAGNOSIS — G93.32 CHRONIC FATIGUE SYNDROME: ICD-10-CM

## 2021-11-11 DIAGNOSIS — F33.42 RECURRENT MAJOR DEPRESSIVE DISORDER, IN FULL REMISSION (H): ICD-10-CM

## 2021-11-11 DIAGNOSIS — G89.4 CHRONIC PAIN SYNDROME: ICD-10-CM

## 2021-12-25 DIAGNOSIS — M79.7 FIBROMYALGIA: ICD-10-CM

## 2021-12-25 DIAGNOSIS — G93.32 CHRONIC FATIGUE SYNDROME: ICD-10-CM

## 2021-12-25 DIAGNOSIS — G89.4 CHRONIC PAIN SYNDROME: ICD-10-CM

## 2021-12-27 RX ORDER — PREGABALIN 150 MG/1
CAPSULE ORAL
Qty: 60 CAPSULE | Refills: 0 | Status: SHIPPED | OUTPATIENT
Start: 2021-12-27 | End: 2022-01-24

## 2021-12-27 NOTE — TELEPHONE ENCOUNTER
Routing refill request to provider for review/approval because:  --No EHR protocol for pregabalin (LYRICA) 150 MG capsule.    --Last visit:  7/27/2021 Darion.

## 2022-01-24 DIAGNOSIS — M79.7 FIBROMYALGIA: ICD-10-CM

## 2022-01-24 DIAGNOSIS — G89.4 CHRONIC PAIN SYNDROME: ICD-10-CM

## 2022-01-24 DIAGNOSIS — G93.32 CHRONIC FATIGUE SYNDROME: ICD-10-CM

## 2022-01-25 RX ORDER — PREGABALIN 150 MG/1
CAPSULE ORAL
Qty: 180 CAPSULE | Refills: 0 | Status: SHIPPED | OUTPATIENT
Start: 2022-01-25 | End: 2022-03-16

## 2022-02-25 ENCOUNTER — MYC MEDICAL ADVICE (OUTPATIENT)
Dept: FAMILY MEDICINE | Facility: CLINIC | Age: 57
End: 2022-02-25
Payer: COMMERCIAL

## 2022-02-25 DIAGNOSIS — G89.4 CHRONIC PAIN SYNDROME: ICD-10-CM

## 2022-02-25 DIAGNOSIS — G93.32 CHRONIC FATIGUE SYNDROME: ICD-10-CM

## 2022-02-25 DIAGNOSIS — M79.7 FIBROMYALGIA: ICD-10-CM

## 2022-02-25 DIAGNOSIS — F33.42 RECURRENT MAJOR DEPRESSIVE DISORDER, IN FULL REMISSION (H): ICD-10-CM

## 2022-02-28 ENCOUNTER — MYC MEDICAL ADVICE (OUTPATIENT)
Dept: FAMILY MEDICINE | Facility: CLINIC | Age: 57
End: 2022-02-28
Payer: COMMERCIAL

## 2022-02-28 NOTE — TELEPHONE ENCOUNTER
Routing refill request to provider for review/approval because:  Drug not on the [facility] refill protocol     Last Written Prescription Date:  2/17/21  Last Fill Quantity: 180,  # refills: 3   Last office visit: 7/27/2021 with prescribing provider:  Darion   Future Office Visit:   Next 5 appointments (look out 90 days)    Apr 25, 2022  7:30 AM  (Arrive by 7:10 AM)  Adult Preventative Visit with Danielle Orlando MD  [facility] ([facility] ) [address] [place] [address]  [phone]             HUNTER Gallego RN  [facility]

## 2022-03-02 NOTE — TELEPHONE ENCOUNTER
Reason for Call:  Form, our goal is to have forms completed with 72 hours, however, some forms may require a visit or additional information.    Type of letter, form or note:  disability    Who is the form from?: Insurance comp    Where did the form come from: patient sent as attachment through [employer]    What clinic location was the form placed at?: [facility]    Where the form was placed: placed for [facility] providers form folder Box/Folder    What number is listed as a contact on the form?: [phone]       Additional comments: patient has a virtual visit 3-    Call taken on 3/2/2022 at 7:38 AM by Michelle Cabrera

## 2022-03-03 NOTE — TELEPHONE ENCOUNTER
Writer responded via [employer].    CECIL RivasN, RN  [facility]

## 2022-03-03 NOTE — TELEPHONE ENCOUNTER
Please be sure he understands that I cannot fill out this form until his visit with me on March 16.    Danielle Orlando MD  [facility]

## 2022-03-16 ENCOUNTER — VIRTUAL VISIT (OUTPATIENT)
Dept: FAMILY MEDICINE | Facility: CLINIC | Age: 57
End: 2022-03-16
Payer: COMMERCIAL

## 2022-03-16 DIAGNOSIS — G93.32 CHRONIC FATIGUE SYNDROME: ICD-10-CM

## 2022-03-16 DIAGNOSIS — F33.42 RECURRENT MAJOR DEPRESSIVE DISORDER, IN FULL REMISSION (H): ICD-10-CM

## 2022-03-16 DIAGNOSIS — M79.7 FIBROMYALGIA: Primary | ICD-10-CM

## 2022-03-16 DIAGNOSIS — G89.4 CHRONIC PAIN SYNDROME: ICD-10-CM

## 2022-03-16 PROBLEM — R10.13 DYSPEPSIA: Status: ACTIVE | Noted: 2022-03-16

## 2022-03-16 PROCEDURE — 99214 OFFICE O/P EST MOD 30 MIN: CPT | Mod: 95 | Performed by: FAMILY MEDICINE

## 2022-03-16 RX ORDER — PREGABALIN 150 MG/1
CAPSULE ORAL
Qty: 180 CAPSULE | Refills: 1 | Status: SHIPPED | OUTPATIENT
Start: 2022-03-16 | End: 2022-04-27

## 2022-03-16 ASSESSMENT — PATIENT HEALTH QUESTIONNAIRE - PHQ9
SUM OF ALL RESPONSES TO PHQ QUESTIONS 1-9: 5
10. IF YOU CHECKED OFF ANY PROBLEMS, HOW DIFFICULT HAVE THESE PROBLEMS MADE IT FOR YOU TO DO YOUR WORK, TAKE CARE OF THINGS AT HOME, OR GET ALONG WITH OTHER PEOPLE: SOMEWHAT DIFFICULT
SUM OF ALL RESPONSES TO PHQ QUESTIONS 1-9: 5

## 2022-03-16 NOTE — PATIENT INSTRUCTIONS
I kindly request that you check your MyChart prior to all appointments with me and complete any assigned questionnaires ahead of time.      FYI:  I do virtual (video) visits exclusively on Wednesdays.  I still do in-person visits at [facility] ([phone]) on Mondays, Tuesdays and Thursdays.  You can schedule a video visit for many conditions.  Please follow this link:  [email or web address]    I do ask that all patients who are taking chronic medications for conditions that I am managing schedule an in-person visit with me at least once a year.

## 2022-03-17 ASSESSMENT — PATIENT HEALTH QUESTIONNAIRE - PHQ9: SUM OF ALL RESPONSES TO PHQ QUESTIONS 1-9: 5

## 2022-04-22 ENCOUNTER — E-VISIT (OUTPATIENT)
Dept: FAMILY MEDICINE | Facility: CLINIC | Age: 57
End: 2022-04-22
Payer: MEDICARE

## 2022-04-22 DIAGNOSIS — Z51.81 MEDICATION MONITORING ENCOUNTER: Primary | ICD-10-CM

## 2022-04-22 PROCEDURE — 99207 PR NON-BILLABLE SERV PER CHARTING: CPT | Performed by: FAMILY MEDICINE

## 2022-04-23 ENCOUNTER — HEALTH MAINTENANCE LETTER (OUTPATIENT)
Age: 57
End: 2022-04-23

## 2022-04-24 NOTE — PATIENT INSTRUCTIONS
Thank you for choosing us for your care. I think a video visit would be best next steps based on your symptoms. I have several openings on Wednesday.  Please schedule that; you won t be charged for this eVisit.      You can schedule an appointment right here in [facility], or call [phone]

## 2022-04-27 ENCOUNTER — VIRTUAL VISIT (OUTPATIENT)
Dept: FAMILY MEDICINE | Facility: CLINIC | Age: 57
End: 2022-04-27
Payer: COMMERCIAL

## 2022-04-27 DIAGNOSIS — F33.42 RECURRENT MAJOR DEPRESSIVE DISORDER, IN FULL REMISSION (H): ICD-10-CM

## 2022-04-27 DIAGNOSIS — G93.32 CHRONIC FATIGUE SYNDROME: ICD-10-CM

## 2022-04-27 DIAGNOSIS — M79.7 FIBROMYALGIA: ICD-10-CM

## 2022-04-27 DIAGNOSIS — G89.4 CHRONIC PAIN SYNDROME: ICD-10-CM

## 2022-04-27 PROCEDURE — 99214 OFFICE O/P EST MOD 30 MIN: CPT | Mod: 95 | Performed by: FAMILY MEDICINE

## 2022-04-27 RX ORDER — PREGABALIN 150 MG/1
CAPSULE ORAL
Qty: 180 CAPSULE | Refills: 1 | Status: SHIPPED | OUTPATIENT
Start: 2022-04-27 | End: 2022-08-15

## 2022-04-27 NOTE — PROGRESS NOTES
"Margaret is a 57 year old who is being evaluated via a billable video visit.      How would you like to obtain your AVS? MyChart  If the video visit is dropped, the invitation should be resent by: Text to cell phone: [phone]  Will anyone else be joining your video visit? No    Video Start Time: 12:00 PM    Assessment & Plan       ICD-10-CM    1. Recurrent major depressive disorder, in full remission (H)  F33.42 milnacipran (SAVELLA) 100 MG TABS tablet   2. Fibromyalgia  M79.7 milnacipran (SAVELLA) 100 MG TABS tablet     pregabalin (LYRICA) 150 MG capsule     tiZANidine (ZANAFLEX) 4 MG tablet   3. Chronic pain syndrome  G89.4 milnacipran (SAVELLA) 100 MG TABS tablet     pregabalin (LYRICA) 150 MG capsule     tiZANidine (ZANAFLEX) 4 MG tablet   4. Chronic fatigue syndrome  R53.82 milnacipran (SAVELLA) 100 MG TABS tablet     pregabalin (LYRICA) 150 MG capsule     tiZANidine (ZANAFLEX) 4 MG tablet      I refilled his meds with note to pharmacist that he will need a 90-day supply of each by May 11 for a [place] travel abroad.  Discussed with patient that insurance should be able to accommodate that.    See me in late summer for routine preventive visit (\"annual physical\")     Return in about 4 months (around 8/27/2022) for routine preventive care, medicare annual wellness visit, in person.    Danielle Orlando MD  [facility]          Subjective   Margaret is a 57 year old who presents for the following health issues      History of Present Illness       Reason for visit:  Medication management for a long trip abroad  Symptom onset:  More than a month  Symptoms include:  Pain, fatigue  Symptom intensity:  Moderate  Symptom progression:  Staying the same  What makes it worse:  Physically over-extending myself  What makes it better:  Rest    He eats 4 or more servings of fruits and vegetables daily.He consumes 0 sweetened beverage(s) daily.He exercises with enough effort to increase his heart " rate 9 or less minutes per day.  He exercises with enough effort to increase his heart rate 3 or less days per week.   He is taking medications regularly.     Going to [place] on May 13  Finally got Visa to return to see his mom and he'll be there until July 24 but possibly longer   Mom has dementia and he is only child.  He is trying to get court-appointed conservator when he is there.      His son had COVID last week and Margaret has been testing himself frequently.      Fibromyalgia and CFS Follow-Up  Pain has been controlled but energy has been poor.  He's concerned that the stress of the trip and all of the tasks he needs to accomplish there will likely trigger an exacerbation.  He wants to be sure he can fill a 90-day supply of all of his meds just before he leaves as he will be in [facility] for nearly 3 months.  His meds are not in sync with this fill date.  Last filled:  Tizanidine 2/16  Lyrica 4/24  Savella 3/1  Cimetidine 3/13 (but he has plenty as he has cut down to using this once daily with good control)      Review of Systems         Objective           Vitals:  No vitals were obtained today due to virtual visit.    Physical Exam   GENERAL: Healthy, alert and no distress  EYES: Eyes grossly normal to inspection.  No discharge or erythema, or obvious scleral/conjunctival abnormalities.  RESP: No audible wheeze, cough, or visible cyanosis.  No visible retractions or increased work of breathing.    SKIN: Visible skin clear. No significant rash, abnormal pigmentation or lesions.  NEURO: Cranial nerves grossly intact.  Mentation and speech appropriate for age.  PSYCH: Mentation appears normal, affect normal/bright, judgement and insight intact, normal speech and appearance well-groomed.        Video-Visit Details    Type of service:  Video Visit    Video End Time:12:15 PM    Originating Location (pt. Location): Home    Distant Location (provider location):  [facility]     Platform used  for Video Visit: Koki

## 2022-05-11 ENCOUNTER — MYC MEDICAL ADVICE (OUTPATIENT)
Dept: FAMILY MEDICINE | Facility: CLINIC | Age: 57
End: 2022-05-11
Payer: COMMERCIAL

## 2022-05-12 NOTE — TELEPHONE ENCOUNTER
"Dr. Orlando-Please review patient's response and may close encounter.    \"Dr. Orlando, thank you so much for your assistance.     My wife, Whitney Coulter, will drop off the [employer] medical form at the [facility] tomorrow, Friday, May 13, sometime during the morning hours.      Again, thank you. I wish you the pleasant spring and enjoyable summer ahead.     Best regards,     Margaret Izaguirre\"    Thank you!  HUNTER Rivas, RN  [facility]      "

## 2022-05-12 NOTE — TELEPHONE ENCOUNTER
"Dr. Orlando-Please review response and may close encounter.    \"Thank you again so much for your help, Dr. Orlando!      Have a wonderful evening, and enjoy the rest of the spring!     Best regards,     Margaret Izaguirre\"    Thank you!  CECIL RivasN, RN  [facility]    "

## 2022-05-12 NOTE — TELEPHONE ENCOUNTER
"Dr. Orlando-Please review patient's message regarding form and inability to schedule a visit with you.    \"Dear Dr. Fields,     I am writing just two days before I leave for [place], on Friday, May 13. Due to a poor timing, [employer] has just mailed me a form to continue my disability benefits, the same one that I have requested your help on each year.     Because I am leaving the country very shortly, I will not be able to schedule a time to meet with you to discuss this form.     I have asked my wife, Whitney Coulter, to drop off the [employer] disability form at your [facility], to your attention. I would like you to complete this form and then mail it back to my home address, which is [address], [place] [address].     If you have any questions or have the need to consult with me or my wife about this matter. you can contact me via [employer] as usual (I will be checking it in [facility]), or you can call Whitney at [phone]. I hereby explicitly authorize any communication between you and my wife Whitney in this or related matter.     I am very sorry about the fact that I am not following our usual protocol concerning these health forms. I would greatly appreciate your assistance in this matter.     Thank you.     Best regards,     Margaret Izaguirre\"        Thank you!  CECIL RivasN, RN  [facility]    "

## 2022-08-08 ASSESSMENT — ENCOUNTER SYMPTOMS
CONSTIPATION: 0
HEMATURIA: 0
ABDOMINAL PAIN: 0
HEMATOCHEZIA: 0
ARTHRALGIAS: 0
WEAKNESS: 1
NAUSEA: 0
HEARTBURN: 0
PALPITATIONS: 0
JOINT SWELLING: 0
CHILLS: 0
MYALGIAS: 1
DIARRHEA: 0
DIZZINESS: 1
COUGH: 0
SORE THROAT: 0
FEVER: 0
NERVOUS/ANXIOUS: 0
FREQUENCY: 0
PARESTHESIAS: 0
SHORTNESS OF BREATH: 0
DYSURIA: 0
EYE PAIN: 0
HEADACHES: 0

## 2022-08-08 ASSESSMENT — ACTIVITIES OF DAILY LIVING (ADL): CURRENT_FUNCTION: NO ASSISTANCE NEEDED

## 2022-08-13 ASSESSMENT — ENCOUNTER SYMPTOMS
PARESTHESIAS: 0
SORE THROAT: 0
SHORTNESS OF BREATH: 0
WEAKNESS: 1
PALPITATIONS: 0
DIARRHEA: 0
CHILLS: 0
HEMATURIA: 0
FEVER: 0
DYSURIA: 0
FREQUENCY: 0
MYALGIAS: 1
CONSTIPATION: 0
COUGH: 0
NERVOUS/ANXIOUS: 0
ABDOMINAL PAIN: 0
JOINT SWELLING: 0
HEADACHES: 0
NAUSEA: 0
ARTHRALGIAS: 0
DIZZINESS: 1
EYE PAIN: 0
HEMATOCHEZIA: 0
HEARTBURN: 0

## 2022-08-13 ASSESSMENT — ACTIVITIES OF DAILY LIVING (ADL): CURRENT_FUNCTION: NO ASSISTANCE NEEDED

## 2022-08-13 NOTE — PROGRESS NOTES
"SUBJECTIVE:   CC: Margaret Izaguirre is an 57 year old male who presents for preventative health visit.     Patient has been advised of split billing requirements and indicates understanding: Yes     Healthy Habits:     In general, how would you rate your overall health?  Fair    Frequency of exercise:  None    Do you usually eat at least 4 servings of fruit and vegetables a day, include whole grains    & fiber and avoid regularly eating high fat or \"junk\" foods?  Yes    Taking medications regularly:  Yes    Medication side effects:  Not applicable    Ability to successfully perform activities of daily living:  No assistance needed    Home Safety:  No safety concerns identified    Hearing Impairment:  No hearing concerns    In the past 6 months, have you been bothered by leaking of urine? Yes    In general, how would you rate your overall mental or emotional health?  Good      PHQ-2 Total Score: 0    Additional concerns today:  No    Ability to successfully perform activities of daily living: Yes, no assistance needed  Home safety:  none identified   Hearing impairment: None    Today's PHQ-2 Score:   PHQ-2 ( 1999 [employer]) 8/8/2022   Q1: Little interest or pleasure in doing things 0   Q2: Feeling down, depressed or hopeless 0   PHQ-2 Score 0   PHQ-2 Total Score (12-17 Years)- Positive if 3 or more points; Administer PHQ-A if positive -   Q1: Little interest or pleasure in doing things Not at all   Q2: Feeling down, depressed or hopeless Not at all   PHQ-2 Score 0       Fibromyalgia/Chronic Fatigue/Depression Follow-Up  He spent 3 months in [place] this summer taking care of his mother's business.  He fortunately did not have any major flares of his pain or fatigue.  He plans to go back for another 2 months as he did not finish everything.    PHQ 2/24/2021 7/27/2021 3/16/2022   PHQ-9 Total Score 3 8 5   Q9: Thoughts of better off dead/self-harm past 2 weeks Not at all Not at all Not at all     GUERRERO-7 SCORE 8/3/2020 2/24/2021 " 7/27/2021   Total Score - - -   Total Score 0 1 0       Abuse: Current or Past(Physical, Sexual or Emotional)- No  Do you feel safe in your environment? Yes      Social History     Tobacco Use     Smoking status: Never Smoker     Smokeless tobacco: Never Used   Substance Use Topics     Alcohol use: Yes     Comment: Very occasional use (once every few months)     If you drink alcohol do you typically have >3 drinks per day or >7 drinks per week? No    Alcohol Use 8/15/2022   Prescreen: >3 drinks/day or >7 drinks/week? -   Prescreen: >3 drinks/day or >7 drinks/week? No       Last PSA:   PSA   Date Value Ref Range Status   04/25/2018 0.87 0 - 4 ug/L Final     Comment:     Assay Method:  Chemiluminescence using Siemens Vista analyzer       Reviewed orders with patient. Reviewed health maintenance and updated orders accordingly - Yes  BP Readings from Last 3 Encounters:   08/15/22 124/86   07/27/21 124/84   03/12/20 (!) 122/90    Wt Readings from Last 3 Encounters:   08/15/22 84.8 kg (187 lb)   07/27/21 84.4 kg (186 lb)   03/12/20 82.6 kg (182 lb)               Reviewed and updated as needed this visit by clinical staff   Tobacco  Allergies  Meds  Problems  Med Hx  Surg Hx  Fam Hx  Soc   Hx        Reviewed and updated as needed this visit by Provider    Allergies  Meds  Problems              Past Medical History:   Diagnosis Date     Adenomatous polyp of colon     4/2015 - rpt in 3 yrs.     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 Gerber, [facility]     Obsessive-compulsive personality disorder (H)      Rhabdomyolysis 12/10    [facility]     "  Past Surgical History:   Procedure Laterality Date     COLONOSCOPY  05/06/2005    WNL     COLONOSCOPY       ENT SURGERY  7/21/2017     WISDOM TOOTH EXTRACTION  2005       Review of Systems   Constitutional: Negative for chills and fever.   HENT: Negative for congestion, ear pain, hearing loss and sore throat.    Eyes: Negative for pain and visual disturbance.   Respiratory: Negative for cough and shortness of breath.    Cardiovascular: Negative for chest pain, palpitations and peripheral edema.   Gastrointestinal: Negative for abdominal pain, constipation, diarrhea, heartburn, hematochezia and nausea.   Genitourinary: Negative for dysuria, frequency, genital sores, hematuria, impotence, penile discharge and urgency.   Musculoskeletal: Positive for myalgias. Negative for arthralgias and joint swelling.   Skin: Negative for rash.   Neurological: Positive for dizziness and weakness. Negative for headaches and paresthesias.   Psychiatric/Behavioral: Negative for mood changes. The patient is not nervous/anxious.          OBJECTIVE:   /86   Pulse 106   Temp 98  F (36.7  C) (Temporal)   Resp 16   Ht 1.816 m (5' 11.5\")   Wt 84.8 kg (187 lb)   SpO2 96%   BMI 25.72 kg/m      Physical Exam  GENERAL: healthy, alert and no distress  EYES: Eyes grossly normal to inspection, conjunctivae and sclerae normal  HENT: ear canals and TM's normal  NECK: no adenopathy, no asymmetry, masses, or scars and thyroid normal to palpation  RESP: lungs clear to auscultation - no rales, rhonchi or wheezes  CV: regular rate and rhythm, normal S1 S2, no S3 or S4, no murmur, click or rub, no peripheral edema  ABDOMEN: soft, nontender, no hepatosplenomegaly, no masses  MS: no gross musculoskeletal defects noted, no edema  SKIN: no suspicious lesions or rashes  NEURO: Normal strength and tone, mentation intact and speech normal  PSYCH: mentation appears normal, affect normal/bright         ASSESSMENT/PLAN:     Routine general medical " "examination at a health care facility      Chronic fatigue syndrome  Fibromyalgia  Chronic pain syndrome  Medication monitoring encounter  stable/adequately controlled.  Continue current medication regimen.   - COMPREHENSIVE METABOLIC PANEL  - milnacipran (SAVELLA) 100 MG TABS tablet  Dispense: 180 tablet; Refill: 1  - pregabalin (LYRICA) 150 MG capsule  Dispense: 180 capsule; Refill: 1  - tiZANidine (ZANAFLEX) 4 MG tablet  Dispense: 180 tablet; Refill: 1    Recurrent major depressive disorder, in full remission (H)  stable/adequately controlled - Continue current medication regimen.   - milnacipran (SAVELLA) 100 MG TABS tablet  Dispense: 180 tablet; Refill: 1    Dyspepsia  He has been able to cut back on the cimetidine and takes this just once daily (at bedtime) with good control of his symptoms.   - cimetidine (TAGAMET) 400 MG tablet  Dispense: 90 tablet; Refill: 3    Screening for prostate cancer  - PROSTATE SPEC ANTIGEN SCREEN       Patient has been advised of split billing requirements and indicates understanding: Yes    COUNSELING:   Reviewed preventive health counseling, as reflected in patient instructions    Estimated body mass index is 25.72 kg/m  as calculated from the following:    Height as of this encounter: 1.816 m (5' 11.5\").    Weight as of this encounter: 84.8 kg (187 lb).       He reports that he has never smoked. He has never used smokeless tobacco.      Counseling Resources:  ATP IV Guidelines  Pooled Cohorts Equation Calculator  FRAX Risk Assessment  ICSI Preventive Guidelines  Dietary Guidelines for Americans, 2010  USDA's MyPlate  ASA Prophylaxis  Lung CA Screening    Danielle Orlando MD  [facility]      The patient was provided with suggestions to help him develop a healthy physical lifestyle.  He is at risk for lack of exercise and has been provided with information to increase physical activity for the benefit of his well-being.  Information on urinary " incontinence and treatment options given to patient.

## 2022-08-15 ENCOUNTER — OFFICE VISIT (OUTPATIENT)
Dept: FAMILY MEDICINE | Facility: CLINIC | Age: 57
End: 2022-08-15
Payer: COMMERCIAL

## 2022-08-15 VITALS
SYSTOLIC BLOOD PRESSURE: 124 MMHG | HEIGHT: 72 IN | WEIGHT: 187 LBS | OXYGEN SATURATION: 96 % | RESPIRATION RATE: 16 BRPM | HEART RATE: 106 BPM | DIASTOLIC BLOOD PRESSURE: 86 MMHG | BODY MASS INDEX: 25.33 KG/M2 | TEMPERATURE: 98 F

## 2022-08-15 DIAGNOSIS — Z51.81 MEDICATION MONITORING ENCOUNTER: ICD-10-CM

## 2022-08-15 DIAGNOSIS — Z12.5 SCREENING FOR PROSTATE CANCER: ICD-10-CM

## 2022-08-15 DIAGNOSIS — F33.42 RECURRENT MAJOR DEPRESSIVE DISORDER, IN FULL REMISSION (H): ICD-10-CM

## 2022-08-15 DIAGNOSIS — M79.7 FIBROMYALGIA: ICD-10-CM

## 2022-08-15 DIAGNOSIS — R10.13 DYSPEPSIA: ICD-10-CM

## 2022-08-15 DIAGNOSIS — Z00.00 ROUTINE GENERAL MEDICAL EXAMINATION AT A HEALTH CARE FACILITY: Primary | ICD-10-CM

## 2022-08-15 DIAGNOSIS — G93.32 CHRONIC FATIGUE SYNDROME: ICD-10-CM

## 2022-08-15 DIAGNOSIS — G89.4 CHRONIC PAIN SYNDROME: ICD-10-CM

## 2022-08-15 LAB
ALBUMIN SERPL-MCNC: 4.1 G/DL (ref 3.4–5)
ALP SERPL-CCNC: 52 U/L (ref 40–150)
ALT SERPL W P-5'-P-CCNC: 34 U/L (ref 0–70)
ANION GAP SERPL CALCULATED.3IONS-SCNC: 10 MMOL/L (ref 3–14)
AST SERPL W P-5'-P-CCNC: 13 U/L (ref 0–45)
BILIRUB SERPL-MCNC: 0.4 MG/DL (ref 0.2–1.3)
BUN SERPL-MCNC: 19 MG/DL (ref 7–30)
CALCIUM SERPL-MCNC: 9.4 MG/DL (ref 8.5–10.1)
CHLORIDE BLD-SCNC: 105 MMOL/L (ref 94–109)
CO2 SERPL-SCNC: 22 MMOL/L (ref 20–32)
CREAT SERPL-MCNC: 0.71 MG/DL (ref 0.66–1.25)
GFR SERPL CREATININE-BSD FRML MDRD: >90 ML/MIN/1.73M2
GLUCOSE BLD-MCNC: 115 MG/DL (ref 70–99)
POTASSIUM BLD-SCNC: 4 MMOL/L (ref 3.4–5.3)
PROT SERPL-MCNC: 8.1 G/DL (ref 6.8–8.8)
PSA SERPL-MCNC: 1.02 UG/L (ref 0–4)
SODIUM SERPL-SCNC: 137 MMOL/L (ref 133–144)

## 2022-08-15 PROCEDURE — 36415 COLL VENOUS BLD VENIPUNCTURE: CPT | Performed by: FAMILY MEDICINE

## 2022-08-15 PROCEDURE — G0103 PSA SCREENING: HCPCS | Performed by: FAMILY MEDICINE

## 2022-08-15 PROCEDURE — 99214 OFFICE O/P EST MOD 30 MIN: CPT | Mod: 25 | Performed by: FAMILY MEDICINE

## 2022-08-15 PROCEDURE — 96127 BRIEF EMOTIONAL/BEHAV ASSMT: CPT | Mod: 59 | Performed by: FAMILY MEDICINE

## 2022-08-15 PROCEDURE — 99396 PREV VISIT EST AGE 40-64: CPT | Performed by: FAMILY MEDICINE

## 2022-08-15 PROCEDURE — 80053 COMPREHEN METABOLIC PANEL: CPT | Performed by: FAMILY MEDICINE

## 2022-08-15 RX ORDER — PREGABALIN 150 MG/1
CAPSULE ORAL
Qty: 180 CAPSULE | Refills: 1 | Status: SHIPPED | OUTPATIENT
Start: 2022-08-15 | End: 2022-09-09

## 2022-08-15 RX ORDER — CIMETIDINE 400 MG
400 TABLET ORAL AT BEDTIME
Qty: 90 TABLET | Refills: 3 | Status: SHIPPED | OUTPATIENT
Start: 2022-08-15 | End: 2023-10-02 | Stop reason: DRUGHIGH

## 2022-08-15 ASSESSMENT — ANXIETY QUESTIONNAIRES
3. WORRYING TOO MUCH ABOUT DIFFERENT THINGS: NOT AT ALL
6. BECOMING EASILY ANNOYED OR IRRITABLE: NOT AT ALL
7. FEELING AFRAID AS IF SOMETHING AWFUL MIGHT HAPPEN: NOT AT ALL
5. BEING SO RESTLESS THAT IT IS HARD TO SIT STILL: NOT AT ALL
1. FEELING NERVOUS, ANXIOUS, OR ON EDGE: NOT AT ALL
GAD7 TOTAL SCORE: 0
GAD7 TOTAL SCORE: 0
2. NOT BEING ABLE TO STOP OR CONTROL WORRYING: NOT AT ALL

## 2022-08-15 ASSESSMENT — PATIENT HEALTH QUESTIONNAIRE - PHQ9
SUM OF ALL RESPONSES TO PHQ QUESTIONS 1-9: 5
5. POOR APPETITE OR OVEREATING: NOT AT ALL

## 2022-08-15 NOTE — PATIENT INSTRUCTIONS
If you have MyChart:  1) I kindly request that you check your MyChart prior to all appointments with me and complete any assigned questionnaires ahead of time.    2) You may receive auto-released results from our system before I have the opportunity to review and comment.  Be assured I will review and comment on all of your results as soon as I can.         FYI:  1) I do virtual (video) visits exclusively on Wednesdays.  I still do in-person visits at [facility] ([phone]) on Mondays, Tuesdays and Thursdays.  You can schedule a video visit for many conditions.  Please follow this link:  [email or web address]  2) My schedule has been booking out very far in advance (2 months).  I apologize for the lack of timely access.  If you need to be seen for a chronic condition or preventive (wellness) visit, please be sure to schedule that appointment 2-3 months in advance.  If you have a concern that you feel cannot wait until my next available appointment (such as a hospital follow-up or new symptom of concern) please ask to speak to one of the [place] nurses who may be able to access a sooner appointment.    I do ask that all patients who are taking chronic medications for conditions that I am managing schedule an in-person visit with me at least once a year.     To schedule any ordered imaging studies (including mammogram and/or DEXA scan) you can call [facility] Scheduling at [phone].      Patient Education   Personalized Prevention Plan  You are due for the preventive services outlined below.  Your care team is available to assist you in scheduling these services.  If you have already completed any of these items, please share that information with your care team to update in your medical record.  Health Maintenance Due   Topic Date Due     Prostate Test  04/25/2019     Liver Monitoring Lab  07/27/2022     Comprehensive Metabolic Panel   07/27/2022     ANNUAL REVIEW OF HM ORDERS  07/27/2022     Your Health Risk Assessment indicates you feel you are not in good health    A healthy lifestyle helps keep the body fit and the mind alert. It helps protect you from disease, helps you fight disease, and helps prevent chronic disease (disease that doesn't go away) from getting worse. This is important as you get older and begin to notice twinges in muscles and joints and a decline in the strength and stamina you once took for granted. A healthy lifestyle includes good healthcare, good nutrition, weight control, recreation, and regular exercise. Avoid harmful substances and do what you can to keep safe. Another part of a healthy lifestyle is stay mentally active and socially involved.    Good healthcare     Have a wellness visit every year.     If you have new symptoms, let us know right away. Don't wait until the next checkup.     Take medicines exactly as prescribed and keep your medicines in a safe place. Tell us if your medicine causes problems.   Healthy diet and weight control     Eat 3 or 4 small, nutritious, low-fat, high-fiber meals a day. Include a variety of fruits, vegetables, and whole-grain foods.     Make sure you get enough calcium in your diet. Calcium, vitamin D, and exercise help prevent osteoporosis (bone thinning).     If you live alone, try eating with others when you can. That way you get a good meal and have company while you eat it.     Try to keep a healthy weight. If you eat more calories than your body uses for energy, it will be stored as fat and you will gain weight.     Recreation   Recreation is not limited to sports and team events. It includes any activity that provides relaxation, interest, enjoyment, and exercise. Recreation provides an outlet for physical, mental, and social energy. It can give a sense of worth and achievement. It can help you stay healthy.    Mental Exercise and Social Involvement  Mental and emotional  health is as important as physical health. Keep in touch with friends and family. Stay as active as possible. Continue to learn and challenge yourself.   Things you can do to stay mentally active are:    Learn something new, like a foreign language or musical instrument.     Play SCRABBLE or do crossword puzzles. If you cannot find people to play these games with you at home, you can play them with others on your computer through the Internet.     Join a games club--anything from card games to chess or checkers or lawn bowling.     Start a new hobby.     Go back to school.     Volunteer.     Read.   Keep up with world events.    Exercise for a Healthier Heart  You may wonder how you can improve the health of your heart. If you re thinking about exercise, you re on the right track. You don t need to become an athlete. But you do need a certain amount of brisk exercise to help strengthen your heart. If you have been diagnosed with a heart condition, your healthcare provider may advise exercise to help stabilize your condition. To help make exercise a habit, choose safe, fun activities.      Exercise with a friend. When activity is fun, you're more likely to stick with it.   Before you start  Check with your healthcare provider before starting an exercise program. This is especially important if you have not been active for a while. It's also important if you have a long-term (chronic) health problem such as heart disease, diabetes, or obesity. Or if you are at high risk for having these problems.   Why exercise?  Exercising regularly offers many healthy rewards. It can help you do all of the following:     Improve your blood cholesterol level to help prevent further heart trouble    Lower your blood pressure to help prevent a stroke or heart attack    Control diabetes, or reduce your risk of getting this disease    Improve your heart and lung function    Reach and stay at a healthy weight    Make your muscles stronger  so you can stay active    Prevent falls and fractures by slowing the loss of bone mass (osteoporosis)    Manage stress better    Reduce your blood pressure    Improve your sense of self and your body image  Exercise tips      Ease into your routine. Set small goals. Then build on them. If you are not sure what your activity level should be, talk with your healthcare provider first before starting an exercise routine.    Exercise on most days. Aim for a total of 150 minutes (2 hours and 30 minutes) or more of moderate-intensity aerobic activity each week. Or 75 minutes (1 hour and 15 minutes) or more of vigorous-intensity aerobic activity each week. Or try for a combination of both. Moderate activity means that you breathe heavier and your heart rate increases but you can still talk. Think about doing 40 minutes of moderate exercise, 3 to 4 times a week. For best results, activity should last for about 40 minutes to lower blood pressure and cholesterol. It's OK to work up to the 40-minute period over time. Examples of moderate-intensity activity are walking 1 mile in 15 minutes. Or doing 30 to 45 minutes of yard work.    Step up your daily activity level.  Along with your exercise program, try being more active the whole day. Walk instead of drive. Or park further away so that you take more steps each day. Do more household tasks or yard work. You may not be able to meet the advised mount of physical activity. But doing some moderate- or vigorous-intensity aerobic activity can help reduce your risk for heart disease. Your healthcare provider can help you figure out what is best for you.    Choose 1 or more activities you enjoy.  Walking is one of the easiest things you can do. You can also try swimming, riding a bike, dancing, or taking an exercise class.    When to call your healthcare provider  Call your healthcare provider if you have any of these:     Chest pain or feel dizzy or lightheaded    Burning, tightness,  pressure, or heaviness in your chest, neck, shoulders, back, or arms    Abnormal shortness of breath    More joint or muscle pain    A very fast or irregular heartbeat (palpitations)  Matilde last reviewed this educational content on 7/1/2019 2000-2021 [employer]. All rights reserved. This information is not intended as a substitute for professional medical care. Always follow your healthcare professional's instructions.          Urinary Incontinence (Male)    Urinary incontinence means not being able to control the release of urine from the bladder.   Causes  Common causes of urinary incontinence in men include:    Infection    Certain medicines    Aging    Poor pelvic muscle tone    Bladder spasms    Obesity    Trouble urinating and fully emptying the bladder (urinary retention)  Other things that can cause incontinence are:     Nervous system diseases    Diabetes    Sleep apnea    Urinary tract infections    Prostate surgery    Pelvic injury  Constipation and smoking have also been identified as risk factors.   Symptoms    Urge incontinence (overactive bladder). This is a sudden urge to urinate. It occurs even though there may not be much urine in the bladder. The need to urinate often during the night is common. It's due to bladder spasms.    Stress incontinence. This is urine leakage that you can't control. It can occur with sneezing, coughing, and other actions that put stress on the bladder.    Treatment  Treatment depends on what is causing the condition. Bladder infections are treated with antibiotics. Urinary retention is treated with a bladder catheter.   Home care  Follow these guidelines when caring for yourself at home:    Don't have any foods and drinks that may irritate the bladder. This includes:  ? Chocolate  ? Alcohol  ? Caffeine  ? Carbonated drinks  ? Acidic fruits and juices    Limit fluids to 6 to 8 cups a day.    Lose weight if you are overweight. This will reduce your  symptoms.    If advised, do regular pelvic muscle-strengthening exercises such as Kegel exercises.    If needed, wear absorbent pads to catch urine. Change the pads often. This is for good hygiene and to prevent skin and bladder infections.    Bathe daily for good hygiene.    If an antibiotic was prescribed to treat a bladder infection, take it until it's finished. Keep taking it even if you are feeling better. This is to make sure your infection has cleared.    If a catheter was left in place, keep bacteria from getting into the collection bag. Don't disconnect the catheter from the collection bag.    Use a leg band to secure the catheter drainage tube, so it does not pull on the catheter. Drain the collection bag when it becomes full. To do this, use the drain spout at the bottom of the bag. Don't disconnect the bag from the catheter.    Don't pull on or try to remove a catheter. The catheter must be removed by a healthcare provider.    If you smoke, stop. Ask your provider for help if you can't do this on your own.  Follow-up care  Follow up with your healthcare provider, or as advised.  When to get medical advice  Call your healthcare provider right away if any of these occur:    Fever over 100.4 F (38 C), or as directed by your provider    Bladder pain or fullness    Belly swelling, nausea, or vomiting    Back pain    Weakness, dizziness, or fainting    If a catheter was left in place, return if:  ? The catheter falls out  ? The catheter stops draining for 6 hours  ? Your urine gets cloudy or smells bad  Bilna last reviewed this educational content on 1/1/2020 2000-2021 [employer]. All rights reserved. This information is not intended as a substitute for professional medical care. Always follow your healthcare professional's instructions.

## 2022-08-15 NOTE — RESULT ENCOUNTER NOTE
Tj Robles,  Your results look good.  The blood sugar is a bit elevated but that reference range is only for a fasting specimen.  If you had eaten anything this morning it is not relevant.      Danielle Orlando MD

## 2022-09-07 ENCOUNTER — MYC MEDICAL ADVICE (OUTPATIENT)
Dept: FAMILY MEDICINE | Facility: CLINIC | Age: 57
End: 2022-09-07

## 2022-09-07 DIAGNOSIS — G89.4 CHRONIC PAIN SYNDROME: ICD-10-CM

## 2022-09-07 DIAGNOSIS — G93.32 CHRONIC FATIGUE SYNDROME: ICD-10-CM

## 2022-09-07 DIAGNOSIS — F33.42 RECURRENT MAJOR DEPRESSIVE DISORDER, IN FULL REMISSION (H): ICD-10-CM

## 2022-09-07 DIAGNOSIS — M79.7 FIBROMYALGIA: ICD-10-CM

## 2022-09-08 NOTE — TELEPHONE ENCOUNTER
Dr. Orlando-    See pts message- traveling 9/27-12/7    Ok to authorize early refill on Savella and Lyrica(180 tab with 1 refill sent 8/15/22)?    CECIL GallegoN RN  [facility]

## 2022-09-09 RX ORDER — PREGABALIN 150 MG/1
CAPSULE ORAL
Qty: 60 CAPSULE | Refills: 0 | Status: SHIPPED | OUTPATIENT
Start: 2022-09-09 | End: 2022-09-12

## 2022-09-12 RX ORDER — PREGABALIN 150 MG/1
CAPSULE ORAL
Qty: 120 CAPSULE | Refills: 0 | Status: SHIPPED | OUTPATIENT
Start: 2022-09-12 | End: 2022-12-08

## 2022-09-12 NOTE — TELEPHONE ENCOUNTER
Writer responded via [employer].    CECIL RivasN, RN  [facility]

## 2022-09-12 NOTE — TELEPHONE ENCOUNTER
"Dr. Orlando-Please review and sign if agree.  1. Savella (#60) and Pregabalin (#120) pended for requested/updated quantities    \"Dr. Orlando, I beg your pardon in advance for bothering you with this. I have discovered that the quantities for the two medications in your most recent Rx scripts were inadvertently reversed. The correct requested quantities are as follows:     Savella = 30 extra days (qty: 60)  Pregablin = 60 extra days (qty: 120)     According to Yara, these quantities were reversed, with qty 120 for Savella and 60 for pregablin in the Rx scripts. The correct figures are 60 for Savella and 120 for pregablin.     I would greatly appreciate your forwarding the corrected Rx scripts according to the foregoing quantities.     Thank you.     Best regards,  Margaret POWELL. Incidentally, in my first [employer] message to you in this subject, I wrote that my trip would be for 91 days. Actually, I was counting 91 days starting from the day I wrote the message (20 days before my departure to [facility]) to the end of my trip (duration: 71 days). I counted the needed quantities for my Rx for the 91-day span, inclusive of the medicine I will be taking before I depart.\"    Thank you!  CECIL RivasN, RN  [facility]    "

## 2022-10-29 ENCOUNTER — HEALTH MAINTENANCE LETTER (OUTPATIENT)
Age: 57
End: 2022-10-29

## 2022-12-08 ENCOUNTER — MYC REFILL (OUTPATIENT)
Dept: FAMILY MEDICINE | Facility: CLINIC | Age: 57
End: 2022-12-08

## 2022-12-08 DIAGNOSIS — G89.4 CHRONIC PAIN SYNDROME: ICD-10-CM

## 2022-12-08 DIAGNOSIS — G93.32 CHRONIC FATIGUE SYNDROME: ICD-10-CM

## 2022-12-08 DIAGNOSIS — M79.7 FIBROMYALGIA: ICD-10-CM

## 2022-12-09 ENCOUNTER — MYC MEDICAL ADVICE (OUTPATIENT)
Dept: FAMILY MEDICINE | Facility: CLINIC | Age: 57
End: 2022-12-09

## 2022-12-09 DIAGNOSIS — G93.32 CHRONIC FATIGUE SYNDROME: ICD-10-CM

## 2022-12-09 DIAGNOSIS — M79.7 FIBROMYALGIA: ICD-10-CM

## 2022-12-09 DIAGNOSIS — G89.4 CHRONIC PAIN SYNDROME: ICD-10-CM

## 2022-12-09 RX ORDER — PREGABALIN 150 MG/1
CAPSULE ORAL
Qty: 180 CAPSULE | Refills: 1 | Status: SHIPPED | OUTPATIENT
Start: 2022-12-09 | End: 2022-12-13

## 2022-12-09 NOTE — TELEPHONE ENCOUNTER
Routing refill request to provider for review/approval because:  Drug not on the [facility] refill protocol     Last Written Prescription Date:  9/12/22  Last Fill Quantity: 120,  # refills: 0   Last office visit: 8/15/2022 with prescribing provider:  Darion   Future Office Visit:      CARLITA Chan RN  [facility]

## 2022-12-13 RX ORDER — PREGABALIN 150 MG/1
CAPSULE ORAL
Qty: 180 CAPSULE | Refills: 1 | Status: SHIPPED | OUTPATIENT
Start: 2022-12-13 | End: 2023-06-21

## 2022-12-14 ENCOUNTER — MYC REFILL (OUTPATIENT)
Dept: FAMILY MEDICINE | Facility: CLINIC | Age: 57
End: 2022-12-14

## 2022-12-14 ENCOUNTER — MYC MEDICAL ADVICE (OUTPATIENT)
Dept: FAMILY MEDICINE | Facility: CLINIC | Age: 57
End: 2022-12-14

## 2022-12-14 DIAGNOSIS — M79.7 FIBROMYALGIA: ICD-10-CM

## 2022-12-14 DIAGNOSIS — F33.42 RECURRENT MAJOR DEPRESSIVE DISORDER, IN FULL REMISSION (H): ICD-10-CM

## 2022-12-14 DIAGNOSIS — G93.32 CHRONIC FATIGUE SYNDROME: ICD-10-CM

## 2022-12-14 DIAGNOSIS — G89.4 CHRONIC PAIN SYNDROME: ICD-10-CM

## 2022-12-15 NOTE — TELEPHONE ENCOUNTER
Routing refill request to provider for review/approval because:  Drug not on the [facility] refill protocol     Last Written Prescription Date:  9/12/222  Last Fill Quantity: 60,  # refills: 0   Last office visit: 8/15/2022 with prescribing provider:  Darion   Future Office Visit:      Patient Comment: Please raise the quantity to 180 for a 90-day Rx.    CARLITA Chan RN  [facility]

## 2023-02-22 DIAGNOSIS — M79.7 FIBROMYALGIA: ICD-10-CM

## 2023-02-22 DIAGNOSIS — G89.4 CHRONIC PAIN SYNDROME: ICD-10-CM

## 2023-02-22 DIAGNOSIS — G93.32 CHRONIC FATIGUE SYNDROME: ICD-10-CM

## 2023-02-23 NOTE — TELEPHONE ENCOUNTER
Routing refill request to provider for review/approval because:  Drug not on the [facility] refill protocol     Last Written Prescription Date:  8/15/22  Last Fill Quantity: 180,  # refills: 1   Last office visit: 8/15/2022 with prescribing provider:  Darion   Future Office Visit:      CARLITA Chan RN  [facility]

## 2023-03-27 ENCOUNTER — MYC MEDICAL ADVICE (OUTPATIENT)
Dept: FAMILY MEDICINE | Facility: CLINIC | Age: 58
End: 2023-03-27
Payer: COMMERCIAL

## 2023-03-27 NOTE — TELEPHONE ENCOUNTER
Please let him know that he needs to be in the state of [place] in at the time of the virtual visit.  (Medical practice law)  Dnaielle Orlando MD

## 2023-03-27 NOTE — TELEPHONE ENCOUNTER
I recommend virtual visit (NOT e-visit).  I do need to have the form prior to the appointment.  He can either upload them, fax them or drop them off.    Danielle Orlando MD  [facility]

## 2023-03-28 ENCOUNTER — TELEPHONE (OUTPATIENT)
Dept: FAMILY MEDICINE | Facility: CLINIC | Age: 58
End: 2023-03-28
Payer: COMMERCIAL

## 2023-03-28 NOTE — MR AVS SNAPSHOT
After Visit Summary   4/19/2017    Margaret Izaguirre    MRN: [id number]           Patient Information     Date Of Birth          1965        Visit Information        Provider Department      4/19/2017 10:30 AM Darrell Shelby MD [facility], [place], Sleep Study        Today's Diagnoses     ENEDINA (obstructive sleep apnea)    -  1      Care Instructions      Your BMI is Body mass index is 29.86 kg/(m^2).  Weight management is a personal decision.  If you are interested in exploring weight loss strategies, the following discussion covers the approaches that may be successful. Body mass index (BMI) is one way to tell whether you are at a healthy weight, overweight, or obese. It measures your weight in relation to your height.  A BMI of 18.5 to 24.9 is in the healthy range. A person with a BMI of 25 to 29.9 is considered overweight, and someone with a BMI of 30 or greater is considered obese. More than two-thirds of American adults are considered overweight or obese.  Being overweight or obese increases the risk for further weight gain. Excess weight may lead to heart disease and diabetes.  Creating and following plans for healthy eating and physical activity may help you improve your health.  Weight control is part of healthy lifestyle and includes exercise, emotional health, and healthy eating habits. Careful eating habits lifelong are the mainstay of weight control. Though there are significant health benefits from weight loss, long-term weight loss with diet alone may be very difficult to achieve- studies show long-term success with dietary management in less than 10% of people. Attaining a healthy weight may be especially difficult to achieve in those with severe obesity. In some cases, medications, devices and surgical management might be considered.  What can you do?  If you are overweight or obese and are interested in methods for weight loss, you should discuss this with your  provider.     Consider reducing daily calorie intake by 500 calories.     Keep a food journal.     Avoiding skipping meals, consider cutting portions instead.    Diet combined with exercise helps maintain muscle while optimizing fat loss. Strength training is particularly important for building and maintaining muscle mass. Exercise helps reduce stress, increase energy, and improves fitness. Increasing exercise without diet control, however, may not burn enough calories to loose weight.       Start walking three days a week 10-20 minutes at a time    Work towards walking thirty minutes five days a week     Eventually, increase the speed of your walking for 1-2 minutes at time    In addition, we recommend that you review healthy lifestyles and methods for weight loss available through the National Institutes of Health patient information sites:  http://win.niddk.nih.gov/publications/index.htm    And look into health and wellness programs that may be available through your health insurance provider, employer, local community center, or juve club.    Weight management plan: Patient was referred to their PCP to discuss a diet and exercise plan.            Follow-ups after your visit        Follow-up notes from your care team     Return in about 2 weeks (around 5/3/2017).      Future tests that were ordered for you today     Open Future Orders        Priority Expected Expires Ordered    HST-Home Sleep Apnea Test Routine  10/19/2017 4/19/2017            Who to contact     If you have questions or need follow up information about today's clinic visit or your schedule please [facility] directly at [phone].  Normal or non-critical lab and imaging results will be communicated to you by MyChart, letter or phone within 4 business days after the clinic has received the results. If you do not hear from us within 7 days, please contact the clinic through MyChart or phone. If you have a critical or  "abnormal lab result, we will notify you by phone as soon as possible.  Submit refill requests through Pollen or call your pharmacy and they will forward the refill request to us. Please allow 3 business days for your refill to be completed.          Additional Information About Your Visit        [employer] Information     Pollen gives you secure access to your electronic health record. If you see a primary care provider, you can also send messages to your care team and make appointments. If you have questions, please call your primary care clinic.  If you do not have a primary care provider, please call [phone] and they will assist you.        Care EveryWhere ID     This is your Care EveryWhere ID. This could be used by other organizations to access your [facility] medical records  [id number]        Your Vitals Were     Respirations Height Pulse Oximetry BMI (Body Mass Index)          18 1.816 m (5' 11.5\") 96% 29.86 kg/m2         Blood Pressure from Last 3 Encounters:   04/19/17 135/83   09/01/16 136/88   03/16/16 116/70    Weight from Last 3 Encounters:   04/19/17 98.5 kg (217 lb 1.6 oz)   09/01/16 98 kg (216 lb)   03/16/16 95.9 kg (211 lb 8 oz)              We Performed the Following     Sleep Positioning Device  ()        Primary Care Provider Office Phone # Fax #    Danielle Orlando [phone]       [facility] [address]  [facility] [address]        Thank you!     Thank you for choosing [facility], SLEEP STUDY  for your care. Our goal is always to provide you with excellent care. Hearing back from our patients is one way we can continue to improve our services. Please take a few minutes to complete the written survey that you may receive in the mail after your visit with us. Thank you!             Your Updated Medication List - Protect others around you: Learn how to safely use, store and throw away your medicines at www.disposemymeds.org.          This list is " accurate as of: 4/19/17  3:31 PM.  Always use your most recent med list.                   Brand Name Dispense Instructions for use    B-12 1000 MCG Tbcr     100 tablet    Take 1,000 mcg by mouth daily       calcium 600 + D 600-400 MG-UNIT per tablet   Generic drug:  calcium-vitamin D      Take 1 tablet by mouth daily       DULoxetine 60 MG EC capsule    CYMBALTA    90 capsule    Take 1 capsule (60 mg) by mouth daily       fexofenadine 180 MG tablet    ALLEGRA    90 tablet    Take 1 tablet (180 mg) by mouth daily       fish oil-omega-3 fatty acids 1000 MG capsule      1 tab daily       ibuprofen 200 MG capsule      2 capsules 3 - 4 times/day PRN       multivitamin per tablet     100    ONE DAILY       pregabalin 150 MG capsule    LYRICA    60 capsule    Take 1 capsule (150 mg) by mouth 2 times daily       tiZANidine 4 MG tablet    ZANAFLEX     Take 2 tablets (8 mg) by mouth At Bedtime       traZODone 50 MG tablet    DESYREL    30 tablet    Take 1 tablet (50 mg) by mouth nightly as needed for sleep       TYLENOL CAPS 500 MG OR      1 CAPSULE EVERY 4 HOURS AS NEEDED       VITAMIN D3 PO      Take 5,000 Units by mouth daily       zinc sulfate 220 (50 ZN) MG capsule    ZINCATE     Take 50 mg by mouth          Patient informed/Family informed/Explanation of wait/Warm blanket/TV

## 2023-03-28 NOTE — TELEPHONE ENCOUNTER
Reason for Call:  Form, our goal is to have forms completed with 72 hours, however, some forms may require a visit or additional information.    Type of letter, form or note:  medical    Who is the form from?: Patient    Where did the form come from: Patient or family brought in       What clinic location was the form placed at?: [facility]    Where the form was placed: [facility] Box/Folder    What number is listed as a contact on the form?: [phone]       Additional comments: Mail in enclosed envelope    Call taken on 3/28/2023 at 8:33 AM by Lucas Paul

## 2023-03-29 ENCOUNTER — VIRTUAL VISIT (OUTPATIENT)
Dept: FAMILY MEDICINE | Facility: CLINIC | Age: 58
End: 2023-03-29
Payer: COMMERCIAL

## 2023-03-29 ENCOUNTER — MYC MEDICAL ADVICE (OUTPATIENT)
Dept: FAMILY MEDICINE | Facility: CLINIC | Age: 58
End: 2023-03-29

## 2023-03-29 DIAGNOSIS — G93.32 CHRONIC FATIGUE SYNDROME: Primary | ICD-10-CM

## 2023-03-29 DIAGNOSIS — R10.13 DYSPEPSIA: ICD-10-CM

## 2023-03-29 DIAGNOSIS — G47.33 OBSTRUCTIVE SLEEP APNEA: ICD-10-CM

## 2023-03-29 DIAGNOSIS — G89.4 CHRONIC PAIN SYNDROME: ICD-10-CM

## 2023-03-29 DIAGNOSIS — M79.7 FIBROMYALGIA: ICD-10-CM

## 2023-03-29 DIAGNOSIS — G47.21 DELAYED SLEEP PHASE SYNDROME: ICD-10-CM

## 2023-03-29 DIAGNOSIS — F33.42 RECURRENT MAJOR DEPRESSIVE DISORDER, IN FULL REMISSION (H): ICD-10-CM

## 2023-03-29 PROCEDURE — 99214 OFFICE O/P EST MOD 30 MIN: CPT | Mod: VID | Performed by: FAMILY MEDICINE

## 2023-03-29 ASSESSMENT — PATIENT HEALTH QUESTIONNAIRE - PHQ9
10. IF YOU CHECKED OFF ANY PROBLEMS, HOW DIFFICULT HAVE THESE PROBLEMS MADE IT FOR YOU TO DO YOUR WORK, TAKE CARE OF THINGS AT HOME, OR GET ALONG WITH OTHER PEOPLE: SOMEWHAT DIFFICULT
SUM OF ALL RESPONSES TO PHQ QUESTIONS 1-9: 4
SUM OF ALL RESPONSES TO PHQ QUESTIONS 1-9: 4

## 2023-03-29 NOTE — PROGRESS NOTES
Margaret is a 58 year old who is being evaluated via a billable video visit.      How would you like to obtain your AVS? MyChart  If the video visit is dropped, the invitation should be resent by: Text to cell phone: [phone]  Will anyone else be joining your video visit? No          Assessment & Plan     Chronic fatigue syndrome  Fibromyalgia  Chronic pain syndrome  Chronic pain is stable and he has a good plan for managing pain.  He'll continue on Savella, Lyrica, and Zanaflex.  Chronic fatigue syndrome is worse and I do worry that his recurrent travel [place] is contributing to this.  Of course he needs to attend to his elderly mother and travel to see her.  I will complete his disability insurance forms.  There has been no change in his functional capacity.    - tiZANidine (ZANAFLEX) 4 MG tablet  Dispense: 180 tablet; Refill: 1    Obstructive sleep apnea  Delayed sleep phase syndrome  I recommended he schedule a follow-up with sleep clinic.  He may need an updated sleep study before getting new dental appliance  - Adult Sleep Eval & Management  Referral    Dyspepsia  well controlled on nightly famotidine - Continue current medication regimen.     Recurrent major depressive disorder, in full remission (H)  Controlled on Savella.    Danielle Orlando MD  [facility]        Subjective   Margaret is a 58 year old, presenting for the following health issues:  Forms    Additional Questions 3/29/2023   Roomed by Nusrat   Accompanied by Self     Forms 3/29/2023   Any forms needing to be completed Yes     History of Present Illness       Reason for visit:  To complete an Attending Physician's Statement for my long-term disability insurance.    He eats 4 or more servings of fruits and vegetables daily.He consumes 0 sweetened beverage(s) daily.He exercises with enough effort to increase his heart rate 9 or less minutes per day.  He exercises with enough effort to increase  his heart rate 3 or less days per week.   He is taking medications regularly.    Today's PHQ-9         PHQ-9 Total Score: 4    PHQ-9 Q9 Thoughts of better off dead/self-harm past 2 weeks :   Not at all    How difficult have these problems made it for you to do your work, take care of things at home, or get along with other people: Somewhat difficult     Fibromyalgia/Chronic Fatigue/Depression Follow-Up  A lot of fatigue lately.    Went to [place] last summer and again in the fall.  Returned in December.  Hasn't felt well since then - low energy.  His baseline pain fluctuates (pain flares can last up to a week) but the severe fatigue has been relentless.  Now going to [facility] again in 10 days (April 9) because his mother's health is failing.  He'll be there one month.  Tries not to nap but does nap some days.  He uses reading as a barometer for how well he is doing.  Some days he cannot read - he can't focus and concentrate.  Visually focusing on TV and the light can bother him.  Usually can sleep ok unless he's having a pain flare with the pain making sleep more uncomfortable. He sometimes sleeps on a recliner if his back is bothering him.  Sometimes he paces at night if pain is 6/10 or above.  He uses topicals for pain but will take ibuprofen for uncontrolled pain - uses that sparingly.    He still sees eYsenia Silveira RN, [facility].      He has a dental appliance for his ENEDINA and needs that updated.  He hasn't been seen by the sleep clinic since 2017.    He uses famotidine nightly for his dyspepsia and finds that very effective.    PHQ 3/16/2022 8/15/2022 3/29/2023   PHQ-9 Total Score 5 5 4   Q9: Thoughts of better off dead/self-harm past 2 weeks Not at all Not at all Not at all     GUERRERO-7 SCORE 2/24/2021 7/27/2021 8/15/2022   Total Score - - -   Total Score 1 0 0         Review of Systems         Objective    Vitals - Patient Reported  Systolic (Patient Reported): 124  Diastolic (Patient  Reported): 86  Weight (Patient Reported): 81.6 kg (180 lb)  Pain Score: No Pain (0)    Physical Exam   GENERAL: Healthy, alert and no distress  EYES: Eyes grossly normal to inspection.  No discharge or erythema, or obvious scleral/conjunctival abnormalities.  RESP: No audible wheeze, cough, or visible cyanosis.  No visible retractions or increased work of breathing.    SKIN: Visible skin clear. No significant rash, abnormal pigmentation or lesions.  NEURO: Cranial nerves grossly intact.  Mentation and speech appropriate for age.  PSYCH: Mentation appears normal, affect normal/bright, judgement and insight intact, normal speech and appearance well-groomed.          Video-Visit Details    Type of service:  Video Visit   Video Start Time: 12:00 PM  Video End Time:12:26 PM    Originating Location (pt. Location): Home  Distant Location (provider location):  Off-site  Platform used for Video Visit: [employer]

## 2023-03-30 NOTE — TELEPHONE ENCOUNTER
Forms completed and mailed back to patient's home in provided return envelope.    I made copy for abstraction and a copy for my personal forms folder.

## 2023-05-22 ENCOUNTER — MYC MEDICAL ADVICE (OUTPATIENT)
Dept: FAMILY MEDICINE | Facility: CLINIC | Age: 58
End: 2023-05-22
Payer: COMMERCIAL

## 2023-05-22 DIAGNOSIS — Z12.11 ENCOUNTER FOR SCREENING COLONOSCOPY: Primary | ICD-10-CM

## 2023-05-24 NOTE — TELEPHONE ENCOUNTER
Dr. Orlando: pended colonoscopy    Dr. Orlando, I have returned from [place] and am contacting you regarding the colonoscopy that you and I discussed back in late March. I am ready to schedule colonoscopy. My preferred location is [facility] [address] [facility] in [place]. I would greatly appreciate your creating a referral for colonoscopy at this location. Thank you in advance for your assistance.    Shaylee POST RN  [facility]

## 2023-05-24 NOTE — TELEPHONE ENCOUNTER
Reception: please fax to [facility].    yaM Labst message sent to patient.    Shaylee POST RN  [facility]

## 2023-06-17 ENCOUNTER — MYC MEDICAL ADVICE (OUTPATIENT)
Dept: FAMILY MEDICINE | Facility: CLINIC | Age: 58
End: 2023-06-17
Payer: COMMERCIAL

## 2023-06-17 DIAGNOSIS — F33.42 RECURRENT MAJOR DEPRESSIVE DISORDER, IN FULL REMISSION (H): ICD-10-CM

## 2023-06-17 DIAGNOSIS — G89.4 CHRONIC PAIN SYNDROME: ICD-10-CM

## 2023-06-17 DIAGNOSIS — M79.7 FIBROMYALGIA: ICD-10-CM

## 2023-06-17 DIAGNOSIS — G93.32 CHRONIC FATIGUE SYNDROME: ICD-10-CM

## 2023-06-21 RX ORDER — PREGABALIN 150 MG/1
CAPSULE ORAL
Qty: 180 CAPSULE | Refills: 1 | Status: SHIPPED | OUTPATIENT
Start: 2023-06-21 | End: 2023-10-02

## 2023-06-21 NOTE — TELEPHONE ENCOUNTER
Dr Orlando,    Pt has 8/17/23 preventative exam scheduled with you.    He is requesting refills on pregabalin and savella    Rxs pended if ok or advise    (last seen by you on 3/29/23)    Colette Russell, RN, BSN  [facility]

## 2023-07-11 ENCOUNTER — TELEPHONE (OUTPATIENT)
Dept: FAMILY MEDICINE | Facility: CLINIC | Age: 58
End: 2023-07-11
Payer: COMMERCIAL

## 2023-07-11 NOTE — TELEPHONE ENCOUNTER
[facility] pharmacist calling in regarding medication interaction between cimetidine and tizanidine    cimetidine increases concentration of tizanidine, can cause drowsiness, low BP & HR    Pharmacist wants to just give us a heads up and he will provide counseling to the patient when he picks up the tizanidine on things to watch out for.     RN tried to clarify with pharmacist if he has picked these up previously and taken together, which he confirmed that patient has filled both of these before (which he did not see before calling)    Routing to provider as FYI. May close encounter if no issues    CECIL GallegoN RN  [facility]

## 2023-07-18 ASSESSMENT — SLEEP AND FATIGUE QUESTIONNAIRES
HOW LIKELY ARE YOU TO NOD OFF OR FALL ASLEEP WHILE LYING DOWN TO REST IN THE AFTERNOON WHEN CIRCUMSTANCES PERMIT: WOULD NEVER DOZE
HOW LIKELY ARE YOU TO NOD OFF OR FALL ASLEEP WHILE WATCHING TV: WOULD NEVER DOZE
HOW LIKELY ARE YOU TO NOD OFF OR FALL ASLEEP WHILE SITTING AND READING: WOULD NEVER DOZE
HOW LIKELY ARE YOU TO NOD OFF OR FALL ASLEEP WHILE SITTING QUIETLY AFTER LUNCH WITHOUT ALCOHOL: WOULD NEVER DOZE
HOW LIKELY ARE YOU TO NOD OFF OR FALL ASLEEP WHEN YOU ARE A PASSENGER IN A CAR FOR AN HOUR WITHOUT A BREAK: SLIGHT CHANCE OF DOZING
HOW LIKELY ARE YOU TO NOD OFF OR FALL ASLEEP WHILE SITTING INACTIVE IN A PUBLIC PLACE: WOULD NEVER DOZE
HOW LIKELY ARE YOU TO NOD OFF OR FALL ASLEEP WHILE SITTING AND TALKING TO SOMEONE: WOULD NEVER DOZE
HOW LIKELY ARE YOU TO NOD OFF OR FALL ASLEEP IN A CAR, WHILE STOPPED FOR A FEW MINUTES IN TRAFFIC: WOULD NEVER DOZE

## 2023-07-21 ENCOUNTER — TRANSFERRED RECORDS (OUTPATIENT)
Dept: HEALTH INFORMATION MANAGEMENT | Facility: CLINIC | Age: 58
End: 2023-07-21
Payer: COMMERCIAL

## 2023-07-25 ENCOUNTER — VIRTUAL VISIT (OUTPATIENT)
Dept: SLEEP MEDICINE | Facility: CLINIC | Age: 58
End: 2023-07-25
Attending: FAMILY MEDICINE
Payer: COMMERCIAL

## 2023-07-25 VITALS
BODY MASS INDEX: 26.6 KG/M2 | HEIGHT: 71 IN | SYSTOLIC BLOOD PRESSURE: 140 MMHG | DIASTOLIC BLOOD PRESSURE: 90 MMHG | WEIGHT: 190 LBS

## 2023-07-25 DIAGNOSIS — G47.21 DELAYED SLEEP PHASE SYNDROME: ICD-10-CM

## 2023-07-25 DIAGNOSIS — F33.42 RECURRENT MAJOR DEPRESSIVE DISORDER, IN FULL REMISSION (H): ICD-10-CM

## 2023-07-25 DIAGNOSIS — G93.32 CHRONIC FATIGUE SYNDROME: Primary | ICD-10-CM

## 2023-07-25 DIAGNOSIS — M79.7 FIBROMYALGIA: ICD-10-CM

## 2023-07-25 DIAGNOSIS — G47.33 OBSTRUCTIVE SLEEP APNEA: ICD-10-CM

## 2023-07-25 PROCEDURE — 99204 OFFICE O/P NEW MOD 45 MIN: CPT | Mod: VID | Performed by: PHYSICIAN ASSISTANT

## 2023-07-25 ASSESSMENT — PAIN SCALES - GENERAL: PAINLEVEL: NO PAIN (0)

## 2023-07-25 NOTE — PATIENT INSTRUCTIONS
[facility]  Search engine: https://mms.aadsm.org/members/directory/search_bootstrap.php?org_id=ADSM&   Certified in Dental Sleep Medicine    Lucas Massey  Degree: DDS  [address]  [address]  [place] [address]  Professional Phone: [phone]  Website: [email or web address]    Ed Pineda  Degree: DDS  [facility]  [address]  [address]  [place] [address]  Professional Phone: [phone]: [phone]    Yaa Willett  [facility]  [address]  [place] [address]  Professional Phone: [phone]  Website: [email or web address]      Adrian Mishra  Degree: DDS  [address]  [address]  [place] [address]  Professional Phone: [phone]  Fax: [phone]    Roney Bledsoe  Degree: DDS  [facility]  [address]  [place] [address]  Professional Phone: [phone]  Website: [email or web address]      [facility]-you should verify insurance coverage  [address]  [address]  [place] [address]  Professional Phone: [phone]  Website: [email or web address]      Judit Soriano--DOES NOT ACCEPT INSURANCE  Degree: DDS--you should verify insurance coverage  [facility]  [address]  [address]  [place] [address]  Professional Phone: [phone]     Lindsay Gomes  Degree: DDS, PhD  [facility]  [address] [place] [address] [id number] [place] [address]   [address],   [address]   [place] [address]   Appointments: [phone]   Fax: [phone]   [facility]   [address] [place] [address]   [address]   [place] [address]   Appointments: [phone]   Fax: [phone]                Everette  Daniel  Degree: ALEXIS  [address] [place] [address]  Professional Phone: [phone]  Fax: [phone]  Website: [email or web address]      Saurav Trinh  Degree: DDS  [facility]  [address] [place] [address]    Mariona Mulet Pradera  Degree: ALEXIS, MS  [facility]  [address] [place] [address]  Professional Phone: [phone]      Skylar Dickerson  Degree: ALEXIS, MS  [facility]  [address]  [address]  [place] [address]  Professional Phone: [phone]    Rita Bowman  Degree: MIKES  [facility]  [address]  [address]  [place] [address]  [place] Office     Primitivo Kuo  Degree: ALEXIS  [facility]  [address] [place] [address]  Professional Phone: [phone]  Website: [email or web address]      Han Mendoza  Degree: ALEXIS  [facility]  [address] [place] [address]  Professional Phone: [phone]  Fax: [phone]      Onofre Patel  Degree: ALEXIS  [facility]  [address]  [address]  [place] [address]    Erwin Craig   Degree: ALEXIS   [facility]   [address]   [address]  [place] [address]  Phone [phone]  Website: [email or web address]                 ACCEPT MEDICARE  Jarret Reese DDS  [address], [place] [address]  [phone]; [phone] (fax)  Recommend    Mayur Harris DDS, MS   [facility]   [address]   [address]   [place] [address]   Appointments: [phone]   Fax: [phone]     Cooper Sanchez DDS   [address]   [place] [address]  Appointments [phone]      ADDITIONAL PROVIDERS    Hardik Lake DDS   [address],   [address]   [place] [address]   Appointments: [phone]   Fax: [phone]       Jordon Young DDS, [facility]  Professional Building  [address] [place] [address]   Appointments: [phone] Ext: [phone]  Fax: [phone]   [email or web address]

## 2023-07-25 NOTE — PROGRESS NOTES
Virtual Visit Details    Type of service:  Video Visit   Video Start Time: 1:41PM  Video End Time:2:06PM  Originating Location (pt. Location): Home    Distant Location (provider location):  Off-site  Platform used for Video Visit: [facility]    Outpatient Sleep Medicine Consultation:      Name: Margaret Izaguirre MRN# [id number]   Age: 58 year old YOB: 1965     Date of Consultation: July 24, 2023  Consultation is requested by: Danielle Orlando MD  [address] [place] [address] Danielle Orlando  Primary care provider: Danielle Orlando       Reason for Sleep Consult:     Margaret Izaguirre is sent by Danielle Orlando for a sleep consultation regarding EENDINA.    Patient s Reason for visit  Margaret Izaguirre main reason for visit: I would like a sleep study because it has been long since the last one.  Patient states problem(s) started: 2008  Margaret Izaguirre's goals for this visit: To assess whether I am a good candidate for a sleep study         Assessment and Plan:     Summary Sleep Diagnoses:  1. Obstructive sleep apnea  2. Delayed sleep phase syndrome  Comorbid Diagnoses:  3. Chronic fatigue syndrome  4. Fibromyalgia  5. Recurrent major depressive disorder, in full remission (H)    Patient presents to clinic today to reestablish care of his mild obstructive sleep apnea.  He was initially diagnosed with ENEDINA back in 2010 and was treated with CPAP for couple years, then switched to oral appliance therapy.  Currently he is on no treatment as his oral appliance is no longer fitting his teeth after having some dental work done.  He is interested in getting another oral appliance made as he recalls improvement in his sleep quality and snoring with the oral appliance.  Before getting a new appliance made, he is interested in updating his sleep study to see if things have changed since initial diagnosis in 2010.  I think this is a good idea as things certainly tend to change over time but suspect sleep apnea  "severity to be similar given weight is the same and symptoms appear to be same. We discussed updating sleep study either with repeat in lab polysomnogram or via home sleep apnea testing.  Patient prefers to complete HST and orders were placed today. He will likely sleep better at home and given his delayed sleep phase will hopefully be able to capture more sleep at home.  If HST is negative or inconclusive can always have him come in for another PSG. Assuming sleep study is positive patient would like a referral to the sleep dentist for another mandibular advancement device.  He was not interested in retrial of CPAP today.  We will plan to see him back shortly after sleep study is completed for results discussion, but if follow-up is scheduled far out he is comfortable with a MyChart message of results as well.  Education materials provided in AVS.  He asked that I give him a list of sleep dentists so he can start checking with insurance on who would be covered.  - HST-Home Sleep Apnea Test - Noxturnal Returnable; Future         History of Present Illness:     Margaret Izaguirre is a 58-year-old male with chronic fatigue syndrome, fibromyalgia, chronic pain syndrome, depression, mild ENDEINA who presents to clinic today to reestablish care.  He was most recently seen in our sleep clinic by Dr. Ye sánchez in April 2017.    Originally diagnosed with ENEDINA via PSG 3/28/2010 (190#) showing AHI 11.2, RDI 17.1, REM RDI 20.4, supine RDI 26, oxygen izabela 89%. Patient was set up with auto CPAP 5 to 15 cm H2O following this study but ultimately CPAP was discontinued after a few years due to generalized mask and pressure discomfort per prior notes, per patient \"I felt chilled with it from the air dropping my body tempterature\".     Patient was referred to sleep dentistry 9/2015 for oral appliance therapy in place of CPAP. Titration PSG with oral appliance completed 3/14/2016 (210#, BMI 30.4).  After 2 adjustments of oral appliance " "residual AHI 8.2 overall, supine AHI 10.6, nonsupine AHI 4.3, no hypoxemia.  No abnormal movements during sleep or other abnormal findings.    Presents to my clinic today at recommendation of PCP, \"he thinks I should do another sleep study because it's been a while and also I am not using any device anymore since I had some work done and this appliance no longer fits my mouth so these things combined is why I am here\".  Patient reports he had some dental work done right before the onset of COVID about 3 years ago, then due to COVID he was not able to get back into the sleep dentist to get mouthguard adjusted to fit properly so discontinued.  States \"I miss the mouthguard it gave me better sleep quality I can tell my sleep quality is worse without it and I am still snoring\".    SLEEP-WAKE SCHEDULE:     Work/School Days: Patient goes to school/work: No   Usually gets into bed at Approximately midnight  Takes patient about An hour to fall asleep  Has trouble falling asleep 3-4 nights per week  Wakes up in the middle of the night 1-2 times.  Wakes up due to Pain;External stimuli (bed partner, pets, noise, etc);Use the bathroom  He has trouble falling back asleep 2 times a week.   It usually takes An hour or longer to get back to sleep  Patient is usually up at It depends upon the previous night's sleep and day's schedule, for example this morning cats woke him up at 5AM and then couldn't/didn't return to sleep   Uses alarm: Yes    Weekends/Non-work Days/All Other Days:  Usually gets into bed at Around midnight   Takes patient about An hour to fall asleep  Patient is usually up at 10 am  Uses alarm: No    Sleep Need  Patient estimates he gets 6 solid hours (7 + hours total) sleep on average   Patient thinks he needs about Eight or more hours sleep    Margaret Izaguirre prefers to sleep in this position(s): Back   Patient states they do the following activities in bed: Read;Use phone, computer, or tablet    Naps  Patient takes " "a purposeful nap None times a week and naps are usually n/a in duration  He dozes off unintentionally Never   Patient has had a driving accident or near-miss due to sleepiness/drowsiness: No    SLEEP DISRUPTIONS:    Breathing/Snoring  Patient snores:Yes  Other people complain about his snoring: Yes  Patient has been told he stops breathing in his sleep:No  He has issues with the following: Morning mouth dryness    Movement:  Patient gets pain, discomfort, with an urge to move:  Yes - \"what it is is my fibromyalgia gets wose at nighttime and it's a pattern so sometimes I'm already asleep and then pain shoots up and it goes away and comes back\"  It happens when he is resting:  Yes  It happens more at night:  Yes  Patient has been told he kicks his legs at night:  No     Behaviors in Sleep:  Margaret Izaguirre has experienced the following behaviors while sleeping: Sleepwalking episode on Ambien, ended up in hospital  Denies sleep talking, sleep eating, bruxism, dream enactment, recurring nightmares, night terrors, sleep paralysis, hypnagogic/hypnopompic hallucinations, cataplexy      Is there anything else you would like your sleep provider to know: Sleepwalking is due to my allergic reaction to zolpidem    CAFFEINE AND OTHER SUBSTANCES:    Patient consumes caffeinated beverages per day:  4 cups of coffee  Last caffeine use is usually: About 2 pm  List of any prescribed or over the counter stimulants that patient takes: None  List of any prescribed or over the counter sleep medication patient takes: None  List of previous sleep medications that patient has tried: Zolpidem  Patient drinks alcohol to help them sleep: No  Patient drinks alcohol near bedtime: No    Family History:  Patient has a family member been diagnosed with a sleep disorder: No      SCALES:    EPWORTH SLEEPINESS SCALE         7/18/2023     9:06 AM    [place] Sleepiness Scale Farheen Menchaca  2921-1523<br>ESS - [place]/English - Final version - 21 Nov 07 - [place] [employer] [place].)   Sitting and reading Would never doze   Watching TV Would never doze   Sitting, inactive in a public place (e.g. a theatre or a meeting) Would never doze   As a passenger in a car for an hour without a break Slight chance of dozing   Lying down to rest in the afternoon when circumstances permit Would never doze   Sitting and talking to someone Would never doze   Sitting quietly after a lunch without alcohol Would never doze   In a car, while stopped for a few minutes in traffic Would never doze   Manning Score (MC) 1   Manning Score (Sleep) 1         INSOMNIA SEVERITY INDEX (VILMA)          7/18/2023     8:59 AM   Insomnia Severity Index (VILMA)   Difficulty falling asleep 2   Difficulty staying asleep 2   Problems waking up too early 1   How SATISFIED/DISSATISFIED are you with your CURRENT sleep pattern? 3   How NOTICEABLE to others do you think your sleep problem is in terms of impairing the quality of your life? 2   How WORRIED/DISTRESSED are you about your current sleep problem? 3   To what extent do you consider your sleep problem to INTERFERE with your daily functioning (e.g. daytime fatigue, mood, ability to function at work/daily chores, concentration, memory, mood, etc.) CURRENTLY? 2   VILMA Total Score 15       Guidelines for Scoring/Interpretation:  Total score categories:  0-7 = No clinically significant insomnia   8-14 = Subthreshold insomnia   15-21 = Clinical insomnia (moderate severity)  22-28 = Clinical insomnia (severe)  Used via courtesy of www.[employer].va.gov with permission from Matias Barrios PhD., [facility]      PATIENT HEALTH QUESTIONNAIRE-9 (PHQ - 9)        3/29/2023     8:07 AM   PHQ-9 ([employer])   1.  Little interest or pleasure in doing things 0   2.  Feeling down, depressed, or hopeless 0   3.  Trouble falling or staying asleep, or sleeping too much 1   4.  Feeling tired or having little energy 2   5.  Poor appetite or overeating 0   6.  Feeling bad about yourself  - or that you are a failure or have let yourself or your family down 0   7.  Trouble concentrating on things, such as reading the newspaper or watching television 1   8.  Moving or speaking so slowly that other people could have noticed. Or the opposite - being so fidgety or restless that you have been moving around a lot more than usual 0   9.  Thoughts that you would be better off dead, or of hurting yourself in some way 0   PHQ-9 Total Score 4   6.  Feeling bad about yourself 0   7.  Trouble concentrating 1   8.  Moving slowly or restless 0   9.  Suicidal or self-harm thoughts 0   1.  Little interest or pleasure in doing things Not at all   2.  Feeling down, depressed, or hopeless Not at all   3.  Trouble falling or staying asleep, or sleeping too much Several days   4.  Feeling tired or having little energy More than half the days   5.  Poor appetite or overeating Not at all   6.  Feeling bad about yourself Not at all   7.  Trouble concentrating Several days   8.  Moving slowly or restless Not at all   9.  Suicidal or self-harm thoughts Not at all   PHQ-9 via [facility] TOTAL SCORE-----> 4 (Minimal depression)   Difficulty at work, home, or with people Somewhat difficult       Developed by Juanito Aguirre, Neha Mary, Tito Holt and colleagues, with an educational maddison from [employer] No permission required to reproduce, translate, display or distribute.        Allergies:    Allergies   Allergen Reactions     Zolpidem Tartrate      Hospitalized after prolonged, severe sleep-walking episode       Medications:    Current Outpatient Medications   Medication Sig Dispense Refill     cetirizine (ZYRTEC) 10 MG tablet Take 1 tablet (10 mg) by mouth daily       cimetidine (TAGAMET) 400 MG tablet Take 1 tablet (400 mg) by mouth At Bedtime 90 tablet 3     IBUPROFEN 200 MG PO CAPS 2 capsules 3 - 4 times/day PRN       milnacipran (SAVELLA) 100 MG TABS tablet TAKE 1 TABLET(100 MG) BY MOUTH TWICE DAILY 180  "tablet 1     pregabalin (LYRICA) 150 MG capsule TAKE 1 CAPSULE(150 MG) BY MOUTH TWICE DAILY 180 capsule 1     tiZANidine (ZANAFLEX) 4 MG tablet Take 2 tablets (8 mg) by mouth At Bedtime 180 tablet 1       Problem List:  Patient Active Problem List    Diagnosis Date Noted     Recurrent major depressive disorder, in full remission (H) 03/16/2022     Priority: Medium     Dyspepsia 03/16/2022     Priority: Medium     Chronic urticaria 08/22/2017     Priority: Medium     Adenomatous polyp of colon      Priority: Medium     4/2015 - rpt in 3 yrs.       Obstructive sleep apnea 03/02/2013     Priority: Medium     Chronic nasal congestion 03/02/2013     Priority: Medium     Delayed sleep phase syndrome 03/02/2013     Priority: Medium     Major depression      Priority: Medium     suicide attempt age 11 in [place]       Memory loss      Priority: Medium     mild cognitive dysfunction, executive function, Dr. Gerber, [facility]       Chronic pain syndrome 07/22/2010     Priority: Medium     Allergic urticaria 04/24/2010     Priority: Medium     Chronic fatigue syndrome      Priority: Medium     diagnosed at [facility]    Here are the dates concerning the end of my employment at [employer], which is the final 'gainful activity\" I have been engaged in:     (a) The last day I was physically in the office at [employer]:  2/18/2015     (b) The date of formal termination from employment at [employer] following expiration of the medical layoff status: 3/22/2016        Fibromyalgia      Priority: Medium        Past Medical/Surgical History:  Past Medical History:   Diagnosis Date     Adenomatous polyp of colon     4/2015 - rpt in 3 yrs.     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 Gerber, [facility]     Obsessive-compulsive personality disorder (H)      Rhabdomyolysis 12/10    [facility]     Past Surgical History:   Procedure Laterality Date     COLONOSCOPY  05/06/2005    WNL     COLONOSCOPY       ENT SURGERY  7/21/2017     WISDOM TOOTH EXTRACTION  2005       Social History:  Social History     Socioeconomic History     Marital status:      Spouse name: Whitney Coulter     Number of children: 1     Years of education: 19     Highest education level: Not on file   Occupational History     Occupation:      Employer: [employer]     Employer: [facility] [employer]   Tobacco Use     Smoking status: Never     Smokeless tobacco: Never   Vaping Use     Vaping Use: Never used   Substance and Sexual Activity     Alcohol use: Yes     Comment: Very occasional use (once every few months)     Drug use: No     Sexual activity: Not Currently     Partners: Female     Birth control/protection: None   Other Topics Concern      Service Not Asked     Blood Transfusions Not Asked     Caffeine Concern Not Asked     Occupational Exposure Not Asked     Hobby Hazards Not Asked     Sleep Concern Not Asked     Stress Concern Not Asked     Weight Concern Not Asked     Special Diet Not Asked     Back Care Not Asked     Exercise Not Asked     Bike Helmet Not Asked     Seat Belt Not Asked     Self-Exams Not Asked     Parent/sibling w/ CABG, MI or angioplasty before 65F 55M? No   Social History Narrative     Not on file     Social Determinants of Health     Financial Resource Strain: Not on file   Food Insecurity: Not on file   Transportation Needs: Not on file   Physical Activity: Not on file   Stress: Not on file   Social Connections: Not on file   Intimate Partner Violence: Not on file   Housing Stability: Not on file       Family History:  Family History  "  Problem Relation Age of Onset     Depression Mother      Neurologic Disorder Father         ALS     Cerebrovascular Disease Father      Cerebrovascular Disease Paternal Grandfather      Depression Maternal Aunt      Depression Other         cousins     Mental Illness Cousin        Review of Systems:  In the last TWO WEEKS have you experienced any of the following symptoms?  Fevers: No  Night Sweats: No  Weight Gain: No  Pain at Night: Yes  Double Vision: No  Changes in Vision: No  Difficulty Breathing through Nose: No  Sore Throat in Morning: No  Dry Mouth in the Morning: Yes  Shortness of Breath Lying Flat: No  Shortness of Breath With Activity: No  Awakening with Shortness of Breath: No  Increased Cough: No  Heart Racing at Night: No  Swelling in Feet or Legs: No  Diarrhea at Night: No  Heartburn at Night: No  Urinating More than Once at Night: Yes  Losing Control of Urine at Night: No  Joint Pains at Night: No  Headaches in Morning: No  Weakness in Arms or Legs: No  Depressed Mood: No  Anxiety: No     Physical Examination:  Vitals: BP (!) 140/90   Ht 1.803 m (5' 11\")   Wt 86.2 kg (190 lb)   BMI 26.50 kg/m    BMI= Body mass index is 26.5 kg/m .  General appearance: Awake, alert, cooperative. Well groomed. Sitting comfortably in chair. In no apparent distress.  HEENT: Head: Normocephalic, atraumatic. Eyes:Conjunctiva clear. Sclera normal. Nose: External appearance without deformity.   Pulmonary:  Able to speak easily in full sentences. No cough or wheeze.   Skin:  No rashes or significant lesions on visible skin.   Neurologic: Alert, oriented x3.   Psychiatric: Mood euthymic. Affect congruent with full range and intensity.         Data: All pertinent previous laboratory data reviewed     Recent Labs   Lab Test 08/15/22  1112 07/27/21  0953    136   POTASSIUM 4.0 4.0   CHLORIDE 105 104   CO2 22 26   ANIONGAP 10 6   * 89   BUN 19 9   CR 0.71 0.81   BECKIE 9.4 9.6       Recent Labs   Lab Test " 07/27/21  0953   WBC 5.8   RBC 4.83   HGB 14.0   HCT 42.0   MCV 87   MCH 29.0   MCHC 33.3   RDW 12.4          Recent Labs   Lab Test 08/15/22  1112   PROTTOTAL 8.1   ALBUMIN 4.1   BILITOTAL 0.4   ALKPHOS 52   AST 13   ALT 34       TSH (mU/L)   Date Value   12/21/2020 3.91   05/15/2019 1.33       No results found for: UAMP, UBARB, BENZODIAZEUR, UCANN, UCOC, OPIT, UPCP    Iron Saturation Index   Date/Time Value Ref Range Status   04/13/2015 01:06 PM 29 15 - 46 % Final     Ferritin   Date/Time Value Ref Range Status   08/15/2006 01:10  20 - 300 ng/mL Final       No results found for: PH, PHARTERIAL, PO2, AF0SHTHMYFW, SAT, PCO2, HCO3, BASEEXCESS, LYNN, BEB    @LABRCNTIPR(phv:4,pco2v:4,po2v:4,hco3v:4,alisha:4,o2per:4)@    Echocardiology: No results found for this or any previous visit (from the past 4320 hour(s)).    Chest x-ray: No results found for this or any previous visit from the past 365 days.      Chest CT: No results found for this or any previous visit from the past 365 days.      PFT: Most Recent [facility] Pulmonary Function Testing    No results found for: 20001  No results found for: 20002  No results found for: 20003  No results found for: 20015  No results found for: 20016  No results found for: 20027  No results found for: 20028  No results found for: 20029  No results found for: 20079  No results found for: 20080  No results found for: 20081  No results found for: 20335  No results found for: 20105  No results found for: 20053  No results found for: 20054  No results found for: 20055      Juli Olivia PA-C 7/24/2023     [facility]  [address], [place] [address]     [facility]  [address], [place] [address]    Chart documentation was completed, in part, with [employer] voice-recognition software. Even though reviewed, some grammatical, spelling, and word errors may remain.    47 minutes spent on day of  encounter reviewing medical records, history and physical examination, review of previous testing and interpretation, documentation and further activities as noted above

## 2023-07-25 NOTE — NURSING NOTE
Has patient had flu shot for current/most recent flu season? If so, when? Yes: 9/18/2022    Is the patient currently in the state of [place]? YES    Visit mode:VIDEO    If the visit is dropped, the patient can be reconnected by: VIDEO VISIT: Text to cell phone: [phone]    Will anyone else be joining the visit? NO      How would you like to obtain your AVS? MyChart    Are changes needed to the allergy or medication list? NO    Reason for visit: Consult      Marija Jeffrey

## 2023-08-23 ASSESSMENT — SLEEP AND FATIGUE QUESTIONNAIRES
HOW LIKELY ARE YOU TO NOD OFF OR FALL ASLEEP WHILE SITTING INACTIVE IN A PUBLIC PLACE: SLIGHT CHANCE OF DOZING
HOW LIKELY ARE YOU TO NOD OFF OR FALL ASLEEP WHILE WATCHING TV: SLIGHT CHANCE OF DOZING
HOW LIKELY ARE YOU TO NOD OFF OR FALL ASLEEP IN A CAR, WHILE STOPPED FOR A FEW MINUTES IN TRAFFIC: WOULD NEVER DOZE
HOW LIKELY ARE YOU TO NOD OFF OR FALL ASLEEP WHILE SITTING AND TALKING TO SOMEONE: WOULD NEVER DOZE
HOW LIKELY ARE YOU TO NOD OFF OR FALL ASLEEP WHEN YOU ARE A PASSENGER IN A CAR FOR AN HOUR WITHOUT A BREAK: SLIGHT CHANCE OF DOZING
HOW LIKELY ARE YOU TO NOD OFF OR FALL ASLEEP WHILE SITTING AND READING: SLIGHT CHANCE OF DOZING
HOW LIKELY ARE YOU TO NOD OFF OR FALL ASLEEP WHILE LYING DOWN TO REST IN THE AFTERNOON WHEN CIRCUMSTANCES PERMIT: MODERATE CHANCE OF DOZING
HOW LIKELY ARE YOU TO NOD OFF OR FALL ASLEEP WHILE SITTING QUIETLY AFTER LUNCH WITHOUT ALCOHOL: SLIGHT CHANCE OF DOZING

## 2023-08-30 ENCOUNTER — OFFICE VISIT (OUTPATIENT)
Dept: SLEEP MEDICINE | Facility: CLINIC | Age: 58
End: 2023-08-30
Attending: PHYSICIAN ASSISTANT
Payer: COMMERCIAL

## 2023-08-30 DIAGNOSIS — F33.42 RECURRENT MAJOR DEPRESSIVE DISORDER, IN FULL REMISSION (H): ICD-10-CM

## 2023-08-30 DIAGNOSIS — G47.21 DELAYED SLEEP PHASE SYNDROME: ICD-10-CM

## 2023-08-30 DIAGNOSIS — G93.32 CHRONIC FATIGUE SYNDROME: ICD-10-CM

## 2023-08-30 DIAGNOSIS — G47.33 OBSTRUCTIVE SLEEP APNEA: ICD-10-CM

## 2023-08-30 DIAGNOSIS — M79.7 FIBROMYALGIA: ICD-10-CM

## 2023-08-30 PROCEDURE — G0399 HOME SLEEP TEST/TYPE 3 PORTA: HCPCS | Performed by: INTERNAL MEDICINE

## 2023-08-31 ENCOUNTER — DOCUMENTATION ONLY (OUTPATIENT)
Dept: SLEEP MEDICINE | Facility: CLINIC | Age: 58
End: 2023-08-31
Payer: COMMERCIAL

## 2023-08-31 NOTE — NURSING NOTE
Pt returned HST device. It was downloaded and forwarded data to the clinical specialist for scoring.  Paris Villagomez CMA on 8/31/2023 at 8:53 AM

## 2023-08-31 NOTE — PROGRESS NOTES
This HSAT was performed using a Noxturnal T3 device which recorded snore, sound, movement activity, body position, nasal pressure, oronasal thermal airflow, pulse, oximetry and both chest and abdominal respiratory effort. HSAT data was restricted to the time patient states they were in bed.     HSAT was scored using 1B 4% hypopnea rule.     HST AHI (Non-PAT): 8.3  Snoring was reported as mild and moderate.  Time with SpO2 below 89% was 1.8 minutes.   Overall signal quality was good     Pt will follow up with sleep provider to determine appropriate therapy.

## 2023-08-31 NOTE — PROGRESS NOTES
HST POST-STUDY QUESTIONNAIRE    What time did you go to bed?  11:30pm  How long do you think it took to fall asleep?  45 mins  What time did you wake up to start the day?  7:00am  Did you get up during the night at all?  No  If you woke up, do you remember approximately what time(s)? Woke up several times, brief periods, time unknown.  Did you have any difficulty with the equipment?  No  Did you us any type of treatment with this study?  None  Was the head of the bed elevated? No  Did you sleep in a recliner?  No  Did you stop using CPAP at least 3 days before this test?  NA  Any other information you'd like us to know?

## 2023-09-04 NOTE — PROCEDURES
"HOME SLEEP STUDY INTERPRETATION        Patient: Margaret Izaguirre  MRN: [id number]  YOB: 1965  Study Date: 8/30/2023  PCP/Referring Provider: Danielle Orlando;   Ordering Provider:   Juli Olivia PA-C       Indications for Home Study: Margaret Izaguirre is a 58 year old male who presents with symptoms suggestive of obstructive sleep apnea.    Estimated body mass index is 26.5 kg/m  as calculated from the following:    Height as of 7/25/23: 1.803 m (5' 11\").    Weight as of 7/25/23: 86.2 kg (190 lb).  Total score - Marsteller: 7 (8/23/2023 11:39 AM)        Data: A full night home sleep study was performed recording the standard physiologic parameters including body position, movement, sound, nasal pressure, thermal oral airflow, chest and abdominal movements with respiratory inductance plethysmography, and oxygen saturation by pulse oximetry. Pulse rate was estimated by oximetry recording. This study was considered adequate based on > 4 hours of quality oximetry and respiratory recording. As specified by the AASM Manual for the Scoring of Sleep and Associated events, version 2.3, Rule VIII.D 1B, 4% oxygen desaturation scoring for hypopneas is used as a standard of care on all home sleep apnea testing.        Analysis Time:  410.1 minutes        Respiration:   Sleep Associated Hypoxemia: sustained hypoxemia was not present. Baseline oxygen saturation was 93%.  Time with saturation less than or equal to 88% was 0.5 minutes. The lowest oxygen saturation was 86%.   Snoring: Snoring was present.  Respiratory events: The home study revealed a presence of 14 obstructive apneas and 4 mixed and central apneas. There were 39 hypopneas resulting in a combined apnea/hypopnea index [AHI] of 8.3 events per hour.  AHI was 13.4 per hour supine, N/A per hour prone, 2.8 per hour on left side, and 5.2 per hour on right side.   Pattern: Excluding events noted above, respiratory rate and pattern was Normal.      Position: Percent " of time spent: supine - 50.4%, prone - 0%, on left - 41%, on right - 8.5%.      Heart Rate: By pulse oximetry normal rate was noted.       Assessment:   Mild obstructive sleep apnea.  Sleep associated hypoxemia was not present.    Recommendations:  Depending on clinic indications for treatment of mild obstructive sleep apnea, following therapy options can be considered.  Patient can consider oral appliance therapy through referral to dental sleep medicine for treatment of mild obstructive sleep apnea.  If there is excessive daytime sleepiness or other qualifying medical comorbidity, consider auto titrating CPAP therapy for treatment.  Suggest optimizing sleep hygiene and avoiding sleep deprivation.        Diagnosis Code(s): Obstructive Sleep Apnea G47.33    Vernon Young MD, September 4, 2023   Diplomate, American Board of Psychiatry and Neurology, Sleep Medicine

## 2023-09-13 ENCOUNTER — MYC MEDICAL ADVICE (OUTPATIENT)
Dept: FAMILY MEDICINE | Facility: CLINIC | Age: 58
End: 2023-09-13
Payer: COMMERCIAL

## 2023-09-25 ASSESSMENT — ENCOUNTER SYMPTOMS
HEADACHES: 0
WEAKNESS: 1
NERVOUS/ANXIOUS: 0
MYALGIAS: 1
FREQUENCY: 0
PALPITATIONS: 0
PARESTHESIAS: 1
SORE THROAT: 0
EYE PAIN: 0
DIZZINESS: 0
HEMATURIA: 0
COUGH: 0
ABDOMINAL PAIN: 0
HEMATOCHEZIA: 0
JOINT SWELLING: 0
DIARRHEA: 0
CONSTIPATION: 0
NAUSEA: 0
DYSURIA: 0
SHORTNESS OF BREATH: 0
FEVER: 0
CHILLS: 0
ARTHRALGIAS: 0
HEARTBURN: 0

## 2023-09-25 ASSESSMENT — ACTIVITIES OF DAILY LIVING (ADL): CURRENT_FUNCTION: NO ASSISTANCE NEEDED

## 2023-09-25 NOTE — NURSING NOTE
HST pick-up, HST drop-off, and follow-up appointment with provider have all been scheduled.  Spring Weinstein, CMA

## 2023-10-01 ASSESSMENT — PATIENT HEALTH QUESTIONNAIRE - PHQ9
10. IF YOU CHECKED OFF ANY PROBLEMS, HOW DIFFICULT HAVE THESE PROBLEMS MADE IT FOR YOU TO DO YOUR WORK, TAKE CARE OF THINGS AT HOME, OR GET ALONG WITH OTHER PEOPLE: SOMEWHAT DIFFICULT
SUM OF ALL RESPONSES TO PHQ QUESTIONS 1-9: 5
SUM OF ALL RESPONSES TO PHQ QUESTIONS 1-9: 5

## 2023-10-02 ENCOUNTER — OFFICE VISIT (OUTPATIENT)
Dept: FAMILY MEDICINE | Facility: CLINIC | Age: 58
End: 2023-10-02
Attending: FAMILY MEDICINE
Payer: COMMERCIAL

## 2023-10-02 VITALS
RESPIRATION RATE: 18 BRPM | OXYGEN SATURATION: 97 % | SYSTOLIC BLOOD PRESSURE: 128 MMHG | HEIGHT: 71 IN | DIASTOLIC BLOOD PRESSURE: 80 MMHG | BODY MASS INDEX: 28.42 KG/M2 | TEMPERATURE: 98.2 F | WEIGHT: 203 LBS | HEART RATE: 88 BPM

## 2023-10-02 DIAGNOSIS — M79.7 FIBROMYALGIA: ICD-10-CM

## 2023-10-02 DIAGNOSIS — G93.32 CHRONIC FATIGUE SYNDROME: ICD-10-CM

## 2023-10-02 DIAGNOSIS — Z23 NEED FOR PROPHYLACTIC VACCINATION AND INOCULATION AGAINST INFLUENZA: ICD-10-CM

## 2023-10-02 DIAGNOSIS — F33.42 RECURRENT MAJOR DEPRESSIVE DISORDER, IN FULL REMISSION (H): ICD-10-CM

## 2023-10-02 DIAGNOSIS — G89.4 CHRONIC PAIN SYNDROME: ICD-10-CM

## 2023-10-02 DIAGNOSIS — Z00.00 ROUTINE GENERAL MEDICAL EXAMINATION AT A HEALTH CARE FACILITY: Primary | ICD-10-CM

## 2023-10-02 DIAGNOSIS — Z12.5 SCREENING FOR PROSTATE CANCER: ICD-10-CM

## 2023-10-02 DIAGNOSIS — R10.13 DYSPEPSIA: ICD-10-CM

## 2023-10-02 LAB
ALBUMIN SERPL BCG-MCNC: 4.6 G/DL (ref 3.5–5.2)
ALP SERPL-CCNC: 51 U/L (ref 40–129)
ALT SERPL W P-5'-P-CCNC: 21 U/L (ref 0–70)
ANION GAP SERPL CALCULATED.3IONS-SCNC: 13 MMOL/L (ref 7–15)
AST SERPL W P-5'-P-CCNC: 23 U/L (ref 0–45)
BILIRUB SERPL-MCNC: 0.3 MG/DL
BUN SERPL-MCNC: 15.1 MG/DL (ref 8–23)
CALCIUM SERPL-MCNC: 9.3 MG/DL (ref 8.8–10.2)
CHLORIDE SERPL-SCNC: 102 MMOL/L (ref 98–107)
CREAT SERPL-MCNC: 0.88 MG/DL (ref 0.67–1.17)
DEPRECATED HCO3 PLAS-SCNC: 24 MMOL/L (ref 22–29)
EGFRCR SERPLBLD CKD-EPI 2021: >90 ML/MIN/1.73M2
GLUCOSE SERPL-MCNC: 101 MG/DL (ref 70–99)
POTASSIUM SERPL-SCNC: 4 MMOL/L (ref 3.4–5.3)
PROT SERPL-MCNC: 7.8 G/DL (ref 6.4–8.3)
PSA SERPL DL<=0.01 NG/ML-MCNC: 0.69 NG/ML (ref 0–4.5)
SODIUM SERPL-SCNC: 139 MMOL/L (ref 135–145)

## 2023-10-02 PROCEDURE — G0103 PSA SCREENING: HCPCS | Performed by: FAMILY MEDICINE

## 2023-10-02 PROCEDURE — 90682 RIV4 VACC RECOMBINANT DNA IM: CPT | Performed by: FAMILY MEDICINE

## 2023-10-02 PROCEDURE — 99214 OFFICE O/P EST MOD 30 MIN: CPT | Mod: 25 | Performed by: FAMILY MEDICINE

## 2023-10-02 PROCEDURE — 36415 COLL VENOUS BLD VENIPUNCTURE: CPT | Performed by: FAMILY MEDICINE

## 2023-10-02 PROCEDURE — 99396 PREV VISIT EST AGE 40-64: CPT | Mod: 25 | Performed by: FAMILY MEDICINE

## 2023-10-02 PROCEDURE — 90471 IMMUNIZATION ADMIN: CPT | Performed by: FAMILY MEDICINE

## 2023-10-02 PROCEDURE — 80053 COMPREHEN METABOLIC PANEL: CPT | Performed by: FAMILY MEDICINE

## 2023-10-02 RX ORDER — PREGABALIN 150 MG/1
CAPSULE ORAL
Qty: 180 CAPSULE | Refills: 1 | Status: SHIPPED | OUTPATIENT
Start: 2023-10-02 | End: 2024-04-17

## 2023-10-02 ASSESSMENT — ENCOUNTER SYMPTOMS
CONSTIPATION: 0
DIZZINESS: 0
WEAKNESS: 1
MYALGIAS: 1
NERVOUS/ANXIOUS: 0
PALPITATIONS: 0
HEARTBURN: 0
HEMATOCHEZIA: 0
HEADACHES: 0
CHILLS: 0
FREQUENCY: 0
PARESTHESIAS: 1
NAUSEA: 0
DYSURIA: 0
HEMATURIA: 0
SORE THROAT: 0
JOINT SWELLING: 0
FEVER: 0
EYE PAIN: 0
COUGH: 0
SHORTNESS OF BREATH: 0
ARTHRALGIAS: 0
DIARRHEA: 0
ABDOMINAL PAIN: 0

## 2023-10-02 ASSESSMENT — ACTIVITIES OF DAILY LIVING (ADL): CURRENT_FUNCTION: NO ASSISTANCE NEEDED

## 2023-10-02 NOTE — PROGRESS NOTES
"SUBJECTIVE:   Margaret is a 58 year old who presents for Preventive Visit.      10/2/2023    10:19 AM   Additional Questions   Roomed by Argelia         10/2/2023    10:19 AM   Patient Reported Additional Medications   Patient reports taking the following new medications none       Healthy Habits:     In general, how would you rate your overall health?  Fair    Frequency of exercise:  None    Do you usually eat at least 4 servings of fruit and vegetables a day, include whole grains    & fiber and avoid regularly eating high fat or \"junk\" foods?  Yes    Taking medications regularly:  Yes    Ability to successfully perform activities of daily living:  No assistance needed    Home Safety:  No safety concerns identified    Hearing Impairment:  No hearing concerns    In the past 6 months, have you been bothered by leaking of urine?  No    In general, how would you rate your overall mental or emotional health?  Good    Additional concerns today:  No    Fibro/CFS - Pain has been better but fatigue has been worse. Pain episodes last a day or two but fatigue seems more relentless.  Came back from [facility] in May.  Going back mid-November for a month.  Depression - he continues to see his therapist every few weeks.   Dyspepsia - symptoms are well controlled on famotidine 400 mg QHS.  He wonders about cutting down on that.    Today's PHQ-9 Score:       10/1/2023     1:01 PM   PHQ-9 SCORE   PHQ-9 Total Score MyChart 5 (Mild depression)   PHQ-9 Total Score 5         8/15/2022    10:59 AM 3/29/2023     8:07 AM 10/1/2023     1:01 PM   PHQ   PHQ-9 Total Score 5 4 5   Q9: Thoughts of better off dead/self-harm past 2 weeks Not at all Not at all Not at all         2/24/2021    11:11 AM 7/27/2021     9:49 AM 8/15/2022    10:59 AM   GUERRERO-7 SCORE   Total Score 1 0 0          Have you ever done Advance Care Planning? (For example, a Health Directive, POLST, or a discussion with a medical provider or your loved ones about your wishes): Yes, patient " [facility] has an Advance Care Planning document and will bring a copy to the clinic.       Fall risk  Fallen 2 or more times in the past year?: No  Any fall with injury in the past year?: No    Cognitive Screening   1) Repeat 3 items (Leader, Season, Table)    2) Clock draw: NORMAL  3) 3 item recall: Recalls 2 objects   Results: NORMAL clock, 1-2 items recalled: COGNITIVE IMPAIRMENT LESS LIKELY    Mini-CogTM Copyright SUNNY Moss. Licensed by the author for use in [facility]; reprinted with permission (jose@[facility]). All rights reserved.      Do you have sleep apnea, excessive snoring or daytime drowsiness? : yes    Reviewed and updated as needed this visit by clinical staff   Tobacco  Allergies  Meds  Problems             Reviewed and updated as needed this visit by Provider    Allergies  Meds  Problems            Social History     Tobacco Use    Smoking status: Never    Smokeless tobacco: Never   Substance Use Topics    Alcohol use: Yes     Comment: Very occasional use (once every few months)             9/25/2023     1:52 PM   Alcohol Use   Prescreen: >3 drinks/day or >7 drinks/week? No     Do you have a current opioid prescription? No  Do you use any other controlled substances or medications that are not prescribed by a provider? None      Current providers sharing in care for this patient include:   Patient Care Team:  Danielle Orlando MD as PCP - General (Family Medicine)  Danielle Orlando MD as Assigned PCP  Juli Olivia PA-C as Assigned Sleep Provider    The following health maintenance items are reviewed in Epic and correct as of today:  Health Maintenance   Topic Date Due    ANNUAL REVIEW OF HM ORDERS  07/27/2022    MEDICARE ANNUAL WELLNESS VISIT  08/15/2023    CMP  08/15/2023    PSA  08/15/2023    INFLUENZA VACCINE (1) 09/01/2023    COVID-19 Vaccine (6 - 2023-24 season) 09/01/2023    PHQ-9  04/02/2024    LIPID  12/21/2025    COLORECTAL CANCER SCREENING  07/21/2026     "ADVANCE CARE PLANNING  10/02/2028    DTAP/TDAP/TD IMMUNIZATION (4 - Td or Tdap) 05/15/2029    HEPATITIS C SCREENING  Completed    HIV SCREENING  Completed    DEPRESSION ACTION PLAN  Completed    ZOSTER IMMUNIZATION  Completed    Pneumococcal Vaccine: Pediatrics (0 to 5 Years) and At-Risk Patients (6 to 64 Years)  Aged Out    IPV IMMUNIZATION  Aged Out    HPV IMMUNIZATION  Aged Out    MENINGITIS IMMUNIZATION  Aged Out     BP Readings from Last 3 Encounters:   10/02/23 128/80   07/25/23 (!) 140/90   08/15/22 124/86    Wt Readings from Last 3 Encounters:   10/02/23 92.1 kg (203 lb)   07/25/23 86.2 kg (190 lb)   08/15/22 84.8 kg (187 lb)               Review of Systems   Constitutional:  Negative for chills and fever.   HENT:  Negative for congestion, ear pain, hearing loss and sore throat.    Eyes:  Negative for pain and visual disturbance.   Respiratory:  Negative for cough and shortness of breath.    Cardiovascular:  Negative for chest pain, palpitations and peripheral edema.   Gastrointestinal:  Negative for abdominal pain, constipation, diarrhea, heartburn, hematochezia and nausea.   Genitourinary:  Negative for dysuria, frequency, genital sores, hematuria, impotence, penile discharge and urgency.   Musculoskeletal:  Positive for myalgias. Negative for arthralgias and joint swelling.   Skin:  Negative for rash.   Neurological:  Positive for weakness and paresthesias. Negative for dizziness and headaches.   Psychiatric/Behavioral:  Negative for mood changes. The patient is not nervous/anxious.          OBJECTIVE:   /80   Pulse 88   Temp 98.2  F (36.8  C) (Oral)   Resp 18   Ht 1.803 m (5' 11\")   Wt 92.1 kg (203 lb)   SpO2 97%   BMI 28.31 kg/m   Estimated body mass index is 28.31 kg/m  as calculated from the following:    Height as of this encounter: 1.803 m (5' 11\").    Weight as of this encounter: 92.1 kg (203 lb).  Physical Exam  GENERAL: healthy, alert and no distress  EYES: Eyes grossly normal to " inspection, conjunctivae and sclerae normal  HENT: ear canals and TM's normal  NECK: no adenopathy, no asymmetry, masses, or scars and thyroid normal to palpation  RESP: lungs clear to auscultation - no rales, rhonchi or wheezes  CV: regular rate and rhythm, normal S1 S2, no S3 or S4, no murmur, click or rub, no peripheral edema  ABDOMEN: soft, nontender, no hepatosplenomegaly, no masses  MS: no gross musculoskeletal defects noted, no edema  SKIN: no suspicious lesions or rashes  NEURO: Grossly normal strength and tone, mentation intact and speech normal  PSYCH: mentation appears normal, affect normal/bright        ASSESSMENT / PLAN:     Routine general medical examination at a health care facility  Reviewed/updated Health Maintenance     Screening for prostate cancer  - PROSTATE SPEC ANTIGEN SCREEN    Need for prophylactic vaccination and inoculation against influenza  - INFLUENZA VACCINE 18-64Y (FLUBLOK)    Dyspepsia  I recommended that he try decreasing the cimetidine dose from 400 to 200 mg QHS.  If that goes well he can then try discontinuing it.    - cimetidine (TAGAMET) 200 MG tablet  Dispense: 90 tablet; Refill: 0    Chronic fatigue syndrome  Fibromyalgia  Chronic pain syndrome  His fatigue is poorly controlled but stable while his fibro is under better control. He'll continue his medication regimen.  I encouraged him to try his stationary bike for short periods to prevent deconditioning - see how he tolerates it.    - COMPREHENSIVE METABOLIC PANEL  - pregabalin (LYRICA) 150 MG capsule  Dispense: 180 capsule; Refill: 1  - tiZANidine (ZANAFLEX) 4 MG tablet  Dispense: 180 tablet; Refill: 1  - milnacipran (SAVELLA) 100 MG TABS tablet  Dispense: 180 tablet; Refill: 1    Recurrent major depressive disorder, in full remission (H24)  stable/adequately controlled.  Continue Savella and psychotherapy.  - milnacipran (SAVELLA) 100 MG TABS tablet  Dispense: 180 tablet; Refill: 1      COUNSELING:  Reviewed preventive  "health counseling, as reflected in patient instructions         BMI:   Estimated body mass index is 28.31 kg/m  as calculated from the following:    Height as of this encounter: 1.803 m (5' 11\").    Weight as of this encounter: 92.1 kg (203 lb).   Weight management plan: weight gain noted, recommended trial of increased activity as above  Wt Readings from Last 3 Encounters:   10/02/23 92.1 kg (203 lb)   07/25/23 86.2 kg (190 lb)   08/15/22 84.8 kg (187 lb)        He reports that he has never smoked. He has never used smokeless tobacco.    Appropriate preventive services were discussed with this patient, including applicable screening as appropriate for fall prevention, nutrition, physical activity, Tobacco-use cessation, weight loss and cognition.  Checklist reviewing preventive services available has been given to the patient.    Reviewed patients plan of care and provided an AVS. The Basic Care Plan (routine screening as documented in Health Maintenance) for Margaret meets the Care Plan requirement. This Care Plan has been established and reviewed with the Patient.      Danielle Orlando MD  [facility]      Identified Health Risks:  I have reviewed Opioid Use Disorder and Substance Use Disorder risk factors and made any needed referrals. The patient was provided with suggestions to help him develop a healthy physical lifestyle.  He is at risk for lack of exercise and has been provided with information to increase physical activity for the benefit of his well-being.  The patient's PHQ-9 score is consistent with mild depression which we addressed as above.  "

## 2023-10-02 NOTE — PATIENT INSTRUCTIONS
For COVID vaccination you can call the COVID vaccination scheduling number: [phone] and press option #1.    Patient Education   Personalized Prevention Plan  You are due for the preventive services outlined below.  Your care team is available to assist you in scheduling these services.  If you have already completed any of these items, please share that information with your care team to update in your medical record.  Health Maintenance Due   Topic Date Due    ANNUAL REVIEW OF HM ORDERS  07/27/2022    Annual Wellness Visit  08/15/2023    Comprehensive Metabolic Panel  08/15/2023    Prostate Test  08/15/2023    Flu Vaccine (1) 09/01/2023    COVID-19 Vaccine (6 - 2023-24 season) 09/01/2023     Your Health Risk Assessment indicates you feel you are not in good health    A healthy lifestyle helps keep the body fit and the mind alert. It helps protect you from disease, helps you fight disease, and helps prevent chronic disease (disease that doesn't go away) from getting worse. This is important as you get older and begin to notice twinges in muscles and joints and a decline in the strength and stamina you once took for granted. A healthy lifestyle includes good healthcare, good nutrition, weight control, recreation, and regular exercise. Avoid harmful substances and do what you can to keep safe. Another part of a healthy lifestyle is stay mentally active and socially involved.    Good healthcare   Have a wellness visit every year.   If you have new symptoms, let us know right away. Don't wait until the next checkup.   Take medicines exactly as prescribed and keep your medicines in a safe place. Tell us if your medicine causes problems.   Healthy diet and weight control   Eat 3 or 4 small, nutritious, low-fat, high-fiber meals a day. Include a variety of fruits, vegetables, and whole-grain foods.   Make sure you get enough calcium in your diet. Calcium, vitamin D, and exercise help prevent osteoporosis (bone  "thinning).   If you live alone, try eating with others when you can. That way you get a good meal and have company while you eat it.   Try to keep a healthy weight. If you eat more calories than your body uses for energy, it will be stored as fat and you will gain weight.     Recreation   Recreation is not limited to sports and team events. It includes any activity that provides relaxation, interest, enjoyment, and exercise. Recreation provides an outlet for physical, mental, and social energy. It can give a sense of worth and achievement. It can help you stay healthy.    Mental Exercise and Social Involvement  Mental and emotional health is as important as physical health. Keep in touch with friends and family. Stay as active as possible. Continue to learn and challenge yourself.   Things you can do to stay mentally active are:  Learn something new, like a foreign language or musical instrument.   Play SCRABBLE or do crossword puzzles. If you cannot find people to play these games with you at home, you can play them with others on your computer through the Internet.   Join a games club--anything from card games to chess or checkers or lawn bowling.   Start a new hobby.   Go back to school.   Volunteer.   Read.   Keep up with world events.  Learning About Being Physically Active  What is physical activity?     Being physically active means doing any kind of activity that gets your body moving.  The types of physical activity that can help you get fit and stay healthy include:  Aerobic or \"cardio\" activities. These make your heart beat faster and make you breathe harder, such as brisk walking, riding a bike, or running. They strengthen your heart and lungs and build up your endurance.  Strength training activities. These make your muscles work against, or \"resist,\" something. Examples include lifting weights or doing push-ups. These activities help tone and strengthen your muscles and bones.  Stretches. These let you " move your joints and muscles through their full range of motion. Stretching helps you be more flexible.  Reaching a balance between these three types of physical activity is important because each one contributes to your overall fitness.  What are the benefits of being active?  Being active is one of the best things you can do for your health. It helps you to:  Feel stronger and have more energy to do all the things you like to do.  Focus better at school or work.  Feel, think, and sleep better.  Reach and stay at a healthy weight.  Lose fat and build lean muscle.  Lower your risk for serious health problems, including diabetes, heart attack, high blood pressure, and some cancers.  Keep your heart, lungs, bones, muscles, and joints strong and healthy.  How can you make being active part of your life?  Start slowly. Make it your long-term goal to get at least 30 minutes of exercise on most days of the week. Walking is a good choice. You also may want to do other activities, such as running, swimming, cycling, or playing tennis or team sports.  Pick activities that you like--ones that make your heart beat faster, your muscles stronger, and your muscles and joints more flexible. If you find more than one thing you like doing, do them all. You don't have to do the same thing every day.  Get your heart pumping every day. Any activity that makes your heart beat faster and keeps it at that rate for a while counts.  Here are some great ways to get your heart beating faster:  Go for a brisk walk, run, or bike ride.  Go for a hike or swim.  Go in-line skating.  Play a game of touch football, basketball, or soccer.  Ride a bike.  Play tennis or racquetball.  Climb stairs.  Even some household chores can be aerobic--just do them at a faster pace. Vacuuming, raking or mowing the lawn, sweeping the garage, and washing and waxing the car all can help get your heart rate up.  Strengthen your muscles during the week. You don't have  "to lift heavy weights or grow big, bulky muscles to get stronger. Doing a few simple activities that make your muscles work against, or \"resist,\" something can help you get stronger.  For example, you can:  Do push-ups or sit-ups, which use your own body weight as resistance.  Lift weights or dumbbells or use stretch bands at home or in a gym or community center.  Stretch your muscles often. Stretching will help you as you become more active. It can help you stay flexible, loosen tight muscles, and avoid injury. It can also help improve your balance and posture and can be a great way to relax.  Be sure to stretch the muscles you'll be using when you work out. It's best to warm your muscles slightly before you stretch them. Walk or do some other light aerobic activity for a few minutes, and then start stretching.  When you stretch your muscles:  Do it slowly. Stretching is not about going fast or making sudden movements.  Don't push or bounce during a stretch.  Hold each stretch for at least 15 to 30 seconds, if you can. You should feel a stretch in the muscle, but not pain.  Breathe out as you do the stretch. Then breathe in as you hold the stretch. Don't hold your breath.  If you're worried about how more activity might affect your health, have a checkup before you start. Follow any special advice your doctor gives you for getting a smart start.  Where can you learn more?  Go to https://www.[employer].net/patiented  Enter W332 in the search box to learn more about \"Learning About Being Physically Active.\"  Current as of: October 10, 2022               Content Version: 13.7    5348-9001 [employer].   Care instructions adapted under license by your healthcare professional. If you have questions about a medical condition or this instruction, always ask your healthcare professional. [employer] disclaims any warranty or liability for your use of this information.      Learning About " Depression Screening  What is depression screening?  Depression screening is a way to see if you have depression symptoms. It may be done by a doctor or counselor. It's often part of a routine checkup. That's because your mental health is just as important as your physical health.  Depression is a mental health condition that affects how you feel, think, and act. You may:  Have less energy.  Lose interest in your daily activities.  Feel sad and grouchy for a long time.  Depression is very common. It affects people of all ages.  Many things can lead to depression. Some people become depressed after they have a stroke or find out they have a major illness like cancer or heart disease. The death of a loved one or a breakup may lead to depression. It can run in families. Most experts believe that a combination of inherited genes and stressful life events can cause it.  What happens during screening?  You may be asked to fill out a form about your depression symptoms. You and the doctor will discuss your answers. The doctor may ask you more questions to learn more about how you think, act, and feel.  What happens after screening?  If you have symptoms of depression, your doctor will talk to you about your options.  Doctors usually treat depression with medicines or counseling. Often, combining the two works best. Many people don't get help because they think that they'll get over the depression on their own. But people with depression may not get better unless they get treatment.  The cause of depression is not well understood. There may be many factors involved. But if you have depression, it's not your fault.  A serious symptom of depression is thinking about death or suicide. If you or someone you care about talks about this or about feeling hopeless, get help right away.  It's important to know that depression can be treated. Medicine, counseling, and self-care may help.  Where can you learn more?  Go to  "https://www.[employer].net/patiented  Enter T185 in the search box to learn more about \"Learning About Depression Screening.\"  Current as of: October 20, 2022               Content Version: 13.7 2006-2023 [employer].   Care instructions adapted under license by your healthcare professional. If you have questions about a medical condition or this instruction, always ask your healthcare professional. [employer] disclaims any warranty or liability for your use of this information.         "

## 2023-10-03 NOTE — RESULT ENCOUNTER NOTE
Tj Robles,  Your lab results look great!  This includes PSA (prostate cancer blood test) and comprehensive metabolic panel results (liver function, kidney function, blood sugar, and blood salts).     Danielle Orlando MD

## 2023-10-11 ENCOUNTER — PATIENT OUTREACH (OUTPATIENT)
Dept: GASTROENTEROLOGY | Facility: CLINIC | Age: 58
End: 2023-10-11
Payer: COMMERCIAL

## 2023-10-31 ASSESSMENT — SLEEP AND FATIGUE QUESTIONNAIRES
HOW LIKELY ARE YOU TO NOD OFF OR FALL ASLEEP WHILE SITTING INACTIVE IN A PUBLIC PLACE: WOULD NEVER DOZE
HOW LIKELY ARE YOU TO NOD OFF OR FALL ASLEEP WHILE LYING DOWN TO REST IN THE AFTERNOON WHEN CIRCUMSTANCES PERMIT: MODERATE CHANCE OF DOZING
HOW LIKELY ARE YOU TO NOD OFF OR FALL ASLEEP WHEN YOU ARE A PASSENGER IN A CAR FOR AN HOUR WITHOUT A BREAK: SLIGHT CHANCE OF DOZING
HOW LIKELY ARE YOU TO NOD OFF OR FALL ASLEEP IN A CAR, WHILE STOPPED FOR A FEW MINUTES IN TRAFFIC: WOULD NEVER DOZE
HOW LIKELY ARE YOU TO NOD OFF OR FALL ASLEEP WHILE SITTING QUIETLY AFTER LUNCH WITHOUT ALCOHOL: WOULD NEVER DOZE
HOW LIKELY ARE YOU TO NOD OFF OR FALL ASLEEP WHILE SITTING AND TALKING TO SOMEONE: WOULD NEVER DOZE
HOW LIKELY ARE YOU TO NOD OFF OR FALL ASLEEP WHILE WATCHING TV: WOULD NEVER DOZE
HOW LIKELY ARE YOU TO NOD OFF OR FALL ASLEEP WHILE SITTING AND READING: SLIGHT CHANCE OF DOZING

## 2023-11-07 ENCOUNTER — VIRTUAL VISIT (OUTPATIENT)
Dept: SLEEP MEDICINE | Facility: CLINIC | Age: 58
End: 2023-11-07
Payer: COMMERCIAL

## 2023-11-07 VITALS — WEIGHT: 190 LBS | BODY MASS INDEX: 26.6 KG/M2 | HEIGHT: 71 IN

## 2023-11-07 DIAGNOSIS — G47.33 OBSTRUCTIVE SLEEP APNEA: Primary | ICD-10-CM

## 2023-11-07 PROCEDURE — 99213 OFFICE O/P EST LOW 20 MIN: CPT | Mod: VID | Performed by: PHYSICIAN ASSISTANT

## 2023-11-07 ASSESSMENT — PAIN SCALES - GENERAL: PAINLEVEL: MILD PAIN (2)

## 2023-11-07 NOTE — PATIENT INSTRUCTIONS
[facility] Sleep Medicine Dentists  Search engine: https://mms.aadsm.org/members/directory/search_bootstrap.php?org_id=[email or web address]&   Certified in Dental Sleep Medicine    Lucas Massey  Degree: DDS  [address]  [address]  [place] [address]  Professional Phone: [phone]  Website: [email or web address]    Ed Pineda  Degree: DDS  [facility]  [address]  [address]  [place] [address]  Professional Phone: [phone]: [phone]    Yaa Willett  [facility]  [address]  [place] [address]  Professional Phone: [phone]  Website: [email or web address]      Adrian Mishra  Degree: DDS  [address]  [address]  [place] [address]  Professional Phone: [phone]  Fax: [phone]    Roney Bledsoe  Degree: DDS  [facility]  [address]  [place] [address]  Professional Phone: [phone]  Website: [email or web address]      [facility]-you should verify insurance coverage  [address]  [address]  [place] [address]  Professional Phone: [phone]  Website: [email or web address]      Judit Soriano--DOES NOT ACCEPT INSURANCE  Degree: DDS--you should verify insurance coverage  [facility]  [address]  [address]  [place] [address]  Professional Phone: [phone]     Lindsay Gomes  Degree: DDS, PhD  [facility]  [address] [place] [address] [address] [place] [address]   [address]   [address]   [place] [address]   Appointments: [phone]   Fax: [phone]   [facility]   [address] [place] [address]   [address]   [place] [address]   Appointments: [phone]   Fax: [phone]                Everette  Daniel  Degree: ALEXIS  [address] [place] [address]  Professional Phone: [phone]  Fax: [phone]  Website: [email or web address]      Saurav Trinh  Degree: DDS  [facility]  [address] [place] [address]    Mariona Mulet Pradera  Degree: ALEXIS, MS  [facility], Oral [facility]  [address] [place] [address]  Professional Phone: [phone]      Skylar Dickerson  Degree: ALEXIS, MS  [facility]  [address]  [address]  [place] [address]  Professional Phone: [phone]    Rita Bowman  Degree: MIKES  [facility]  [address]  [address]  [place] [address]  [place] Office     Primitivo Kuo  Degree: ALEXIS  [facility]  [address] [place] [address]  Professional Phone: [phone]  Website: [email or web address]      Han Menodza  Degree: ALEXIS  [facility]  [address] [place] [address]  Professional Phone: [phone]  Fax: [phone]      Onofre Patel  Degree: ALEXIS  [facility]  [address] [place]  [address]  [place] [address]    Erwin Craig   Degree: ALEXIS   [facility]   [address]   [address]  [place] [address]  Phone [phone]  Website: [email or web address]                 ACCEPT MEDICARE  Jarret Reese DDS  [address], [place] [address]  [phone]; [phone] (fax)  [place]    Mayur Harris DDS, MS   [facility]   [address]   [address]   [place] [address]   Appointments: [phone]   Fax: [phone]     Cooper Sanchez DDS   [address]   [place] [address]  Appointments [phone]      ADDITIONAL PROVIDERS    Hardik Lake DDS   [address],   [address]   [place] [address]   Appointments: [phone]   Fax: [phone]       Jordon Young DDS, [facility]  Professional Building  [address] [place] [address]   Appointments: [phone] Ext: [phone]  Fax: [phone]   [email or web address]

## 2023-11-07 NOTE — NURSING NOTE
Patient confirms medications and allergies are accurate via patients echeck in completion, and or denies any changes since last reviewed/verified.       Is the patient currently in the state of [place]? YES    Visit mode:VIDEO    If the visit is dropped, the patient can be reconnected by: VIDEO VISIT: Text to cell phone:   Telephone Information:   Mobile [phone]       Will anyone else be joining the visit? NO  (If patient encounters technical issues they should call [phone]    How would you like to obtain your AVS? MyChart    Are changes needed to the allergy or medication list? No    Reason for visit: RECHECK    Jordana MELARA

## 2023-11-07 NOTE — PROGRESS NOTES
Virtual Visit Details    Type of service:  Video Visit   Video Start Time: 12:02PM  Video End Time:12:12PM  Originating Location (pt. Location): Home    Distant Location (provider location):  Off-site  Platform used for Video Visit: [facility]   Outpatient Sleep Medicine  Nov 7, 2023       Name: Margaret Izaguirre MRN# [id number]   Age: 58 year old YOB: 1965            Assessment and Plan:   1. Obstructive sleep apnea  During this visit, we reviewed the summary of the study including position, event distribution, oximetry and heart rate. Mild obstructive sleep apnea was seen with AHI 8.3. Sleep associated hypoxemia was not present.    As planned at last visit referral placed today to sleep dentistry for new oral appliance.  Patient is unsure what specific dentist he wants to see so he will let me know once he makes a decision and verifies with insurance.  In the interim as he awaits to see sleep dentistry discussed avoidance of supine sleep with positional therapy device.  Patient will follow-up with me as needed/pending sleep dentistry recommendations.  - Sleep Dental Referral; Future       Chief Complaint      Chief Complaint   Patient presents with    RECHECK          History of Present Illness:   Margaret Izaguirre is a 58 year old male who presents to the clinic for results of recent sleep study completed on 8/30/23. Study was completed to reevaluate ENEDINA severity.     Originally diagnosed with ENEDINA via PSG 3/28/2010 (190#) showing AHI 11.2, RDI 17.1, REM RDI 20.4, supine RDI 26, oxygen izabela 89%.     Reviewed results of sleep study with patient as follows:  HOME SLEEP STUDY INTERPRETATION  Patient: Margaret Izaguirre  MRN: [id number]  YOB: 1965  Study Date: 8/30/2023  PCP/Referring Provider: Danielle Orlando;   Ordering Provider:   Juli Olivia PA-C    Indications for Home Study: Margaret Izaguirre is a 58 year old male who presents with symptoms suggestive of  "obstructive sleep apnea.     Estimated body mass index is 26.5 kg/m  as calculated from the following:    Height as of 7/25/23: 1.803 m (5' 11\").    Weight as of 7/25/23: 86.2 kg (190 lb).  Total score - Sterling: 7 (8/23/2023 11:39 AM)     Data: A full night home sleep study was performed recording the standard physiologic parameters including body position, movement, sound, nasal pressure, thermal oral airflow, chest and abdominal movements with respiratory inductance plethysmography, and oxygen saturation by pulse oximetry. Pulse rate was estimated by oximetry recording. This study was considered adequate based on > 4 hours of quality oximetry and respiratory recording. As specified by the AASM Manual for the Scoring of Sleep and Associated events, version 2.3, Rule VIII.D 1B, 4% oxygen desaturation scoring for hypopneas is used as a standard of care on all home sleep apnea testing.     Analysis Time:  410.1 minutes     Respiration:   Sleep Associated Hypoxemia: sustained hypoxemia was not present. Baseline oxygen saturation was 93%.  Time with saturation less than or equal to 88% was 0.5 minutes. The lowest oxygen saturation was 86%.   Snoring: Snoring was present.  Respiratory events: The home study revealed a presence of 14 obstructive apneas and 4 mixed and central apneas. There were 39 hypopneas resulting in a combined apnea/hypopnea index [AHI] of 8.3 events per hour.  AHI was 13.4 per hour supine, N/A per hour prone, 2.8 per hour on left side, and 5.2 per hour on right side.   Pattern: Excluding events noted above, respiratory rate and pattern was Normal.     Position: Percent of time spent: supine - 50.4%, prone - 0%, on left - 41%, on right - 8.5%.     Heart Rate: By pulse oximetry normal rate was noted.     Past medical/surgical history, family history, social history, medications and allergies were reviewed.           Physical Examination:   Ht 1.803 m (5' 11\")   Wt 86.2 kg (190 lb)   BMI 26.50 kg/m  "   General appearance: Awake, alert, cooperative. Well groomed. Sitting comfortably in chair. In no apparent distress.  HEENT: Head: Normocephalic, atraumatic. Eyes:Conjunctiva clear. Sclera normal. Nose: External appearance without deformity.   Pulmonary:  Able to speak easily in full sentences. No cough or wheeze.   Skin:  No rashes or significant lesions on visible skin.   Neurologic: Alert, oriented x3.   Psychiatric: Mood euthymic. Affect congruent with full range and intensity.      CC:  Danielle Orlando PA-C  Nov 7, 2023     [facility]  [address] [place] [address]     [facility]  [address] [address]    Chart documentation was completed, in part, with Innohub voice-recognition software. Even though reviewed, some grammatical, spelling, and word errors may remain.    20 minutes spent on day of encounter doing chart review, history and exam, documentation, and further activities as noted above

## 2024-02-28 NOTE — TELEPHONE ENCOUNTER
Routing refill request to provider for review/approval because:  Drug not on the [facility] refill protocol     Last Written Prescription Date:  12/27/21  Last Fill Quantity: 60,  # refills: 0   Last office visit: 7/27/2021 with prescribing provider:  Darion Johnson Office Visit:      Shaylee Chan RN  [facility]                   [Follow-Up] : a follow-up visit [Sleep Apnea] : sleep apnea [Asthma] : asthma

## 2024-04-02 DIAGNOSIS — G93.32 CHRONIC FATIGUE SYNDROME: ICD-10-CM

## 2024-04-02 DIAGNOSIS — M79.7 FIBROMYALGIA: ICD-10-CM

## 2024-04-02 DIAGNOSIS — G89.4 CHRONIC PAIN SYNDROME: ICD-10-CM

## 2024-04-06 ENCOUNTER — MYC MEDICAL ADVICE (OUTPATIENT)
Dept: FAMILY MEDICINE | Facility: CLINIC | Age: 59
End: 2024-04-06
Payer: COMMERCIAL

## 2024-04-10 NOTE — TELEPHONE ENCOUNTER
The forms were in your folder, I placed them there yesterday and Juan grabbed them earlier today to ensure we had the same ones he attached.  That is also why I did not schedule him until I received the forms, if you review this thread from the first message.      I would not have documented that I put them there if I had not.

## 2024-04-17 ENCOUNTER — VIRTUAL VISIT (OUTPATIENT)
Dept: FAMILY MEDICINE | Facility: CLINIC | Age: 59
End: 2024-04-17
Payer: COMMERCIAL

## 2024-04-17 DIAGNOSIS — G89.4 CHRONIC PAIN SYNDROME: ICD-10-CM

## 2024-04-17 DIAGNOSIS — M79.7 FIBROMYALGIA: ICD-10-CM

## 2024-04-17 DIAGNOSIS — F33.42 RECURRENT MAJOR DEPRESSIVE DISORDER, IN FULL REMISSION (H): ICD-10-CM

## 2024-04-17 DIAGNOSIS — G93.32 CHRONIC FATIGUE SYNDROME: ICD-10-CM

## 2024-04-17 PROCEDURE — 99214 OFFICE O/P EST MOD 30 MIN: CPT | Mod: 95 | Performed by: FAMILY MEDICINE

## 2024-04-17 PROCEDURE — 96127 BRIEF EMOTIONAL/BEHAV ASSMT: CPT | Mod: 95 | Performed by: FAMILY MEDICINE

## 2024-04-17 PROCEDURE — G2211 COMPLEX E/M VISIT ADD ON: HCPCS | Mod: 95 | Performed by: FAMILY MEDICINE

## 2024-04-17 RX ORDER — PREGABALIN 150 MG/1
CAPSULE ORAL
Qty: 180 CAPSULE | Refills: 1 | Status: SHIPPED | OUTPATIENT
Start: 2024-04-17

## 2024-04-17 NOTE — PROGRESS NOTES
"Margaret is a 59 year old who is being evaluated via a billable video visit.    How would you like to obtain your AVS? MyChart  If the video visit is dropped, the invitation should be resent by: Text to cell phone: [phone]  Will anyone else be joining your video visit? No      Assessment & Plan     Chronic fatigue syndrome  Fibromyalgia  Chronic pain syndrome  stable/adequately controlled.  As he is occasionally using an extra tizanidine I increased his 3-month dispense to #190.    - pregabalin (LYRICA) 150 MG capsule  Dispense: 180 capsule; Refill: 1  - milnacipran (SAVELLA) 100 MG TABS tablet  Dispense: 180 tablet; Refill: 1  - tiZANidine (ZANAFLEX) 4 MG tablet  Dispense: 190 tablet; Refill: 1    Recurrent major depressive disorder, in full remission (H24)  stable/adequately controlled - Continue current medication regimen.   - milnacipran (SAVELLA) 100 MG TABS tablet  Dispense: 180 tablet; Refill: 1    I will complete his disability paperwork.    The longitudinal plan of care for the diagnosis(es)/condition(s) as documented were addressed during this visit. Due to the added complexity in care, I will continue to support Margaret in the subsequent management and with ongoing continuity of care.     Return in about 6 months (around 10/17/2024) for routine preventive care, with me (Dr. Orlando), in person.         Arielle Robles is a 59 year old, presenting for the following health issues:  Forms      4/17/2024     9:09 AM   Additional Questions   Roomed by JOHNSON MAYFIELD     Video Start Time: 10:06 AM     HPI       Fibro/CFS - Pain has been \"not too bad.\"  He always has a \"low grade\" pain constantly but has had severe/incapacitating pain just a couple times in the past month.  Pain flares can last hours to days. He was in [place] for 2 weeks in March and he returned a month ago.  He has been very fatigued since then as the travel and jet lag typically exacerbate his CFS.  He uses a deep soaking tub and finds that " helpful.  He tries to avoid ibuprofen and uses it a few times per month during pain exacerbations.  He was able to wean off the cimetidine. Still sees Yesenia Silveira RN (Fibro nurse) monthly.      Dizziness - He fell in [place]. He's had longstanding dizziness symptoms with his CFS/Fibro, but has never previously had a fall.  He had a sudden episode of lightheadedness and weakness while walking in a crowd at a train station in [place].  The crowd was moving fast and he fell down.  The dizziness felt more like vertigo than presyncope and he had no LOC.  No injury to his body (but his cell phone was cracked).  No preceding chest pain, palpitations, nausea.  He thinks he fell because he was walking briskly in a crowd and couldn't stop or slow down.        3/29/2023     8:07 AM 10/1/2023     1:01 PM 4/9/2024     1:00 PM   PHQ   PHQ-9 Total Score 4 5 7   Q9: Thoughts of better off dead/self-harm past 2 weeks Not at all Not at all Not at all          Objective         Vitals:  No vitals were obtained today due to virtual visit.    Physical Exam   GENERAL: alert and no distress  EYES: Eyes grossly normal to inspection.  No discharge or erythema, or obvious scleral/conjunctival abnormalities.  RESP: No audible wheeze, cough, or visible cyanosis.    SKIN: Visible skin clear. No significant rash, abnormal pigmentation or lesions.  NEURO: Cranial nerves grossly intact.  Mentation and speech appropriate for age.  PSYCH: Appropriate affect, tone, and pace of words        Video-Visit Details    Type of service:  Video Visit   Video End Time:10:31 AM  Originating Location (pt. Location): Home  Distant Location (provider location):  Off-site  Platform used for Video Visit: Koki  Signed Electronically by: Danielle Orlando MD

## 2024-06-17 ENCOUNTER — MYC REFILL (OUTPATIENT)
Dept: FAMILY MEDICINE | Facility: CLINIC | Age: 59
End: 2024-06-17
Payer: COMMERCIAL

## 2024-06-17 DIAGNOSIS — G89.4 CHRONIC PAIN SYNDROME: ICD-10-CM

## 2024-06-17 DIAGNOSIS — M79.7 FIBROMYALGIA: ICD-10-CM

## 2024-06-17 DIAGNOSIS — G93.32 CHRONIC FATIGUE SYNDROME: ICD-10-CM

## 2024-06-17 RX ORDER — PREGABALIN 150 MG/1
CAPSULE ORAL
Qty: 180 CAPSULE | Refills: 1 | OUTPATIENT
Start: 2024-06-17

## 2024-06-24 ENCOUNTER — MYC MEDICAL ADVICE (OUTPATIENT)
Dept: PEDIATRICS | Facility: CLINIC | Age: 59
End: 2024-06-24
Payer: COMMERCIAL

## 2024-06-24 ASSESSMENT — PATIENT HEALTH QUESTIONNAIRE - PHQ9
SUM OF ALL RESPONSES TO PHQ QUESTIONS 1-9: 3
10. IF YOU CHECKED OFF ANY PROBLEMS, HOW DIFFICULT HAVE THESE PROBLEMS MADE IT FOR YOU TO DO YOUR WORK, TAKE CARE OF THINGS AT HOME, OR GET ALONG WITH OTHER PEOPLE: SOMEWHAT DIFFICULT
SUM OF ALL RESPONSES TO PHQ QUESTIONS 1-9: 3

## 2024-06-25 ENCOUNTER — OFFICE VISIT (OUTPATIENT)
Dept: FAMILY MEDICINE | Facility: CLINIC | Age: 59
End: 2024-06-25
Payer: COMMERCIAL

## 2024-06-25 VITALS
TEMPERATURE: 97.8 F | RESPIRATION RATE: 16 BRPM | HEIGHT: 70 IN | WEIGHT: 204 LBS | SYSTOLIC BLOOD PRESSURE: 134 MMHG | BODY MASS INDEX: 29.2 KG/M2 | HEART RATE: 88 BPM | OXYGEN SATURATION: 95 % | DIASTOLIC BLOOD PRESSURE: 87 MMHG

## 2024-06-25 DIAGNOSIS — L29.9 ITCHING: Primary | ICD-10-CM

## 2024-06-25 LAB
ALBUMIN SERPL BCG-MCNC: 4.6 G/DL (ref 3.5–5.2)
ALP SERPL-CCNC: 47 U/L (ref 40–150)
ALT SERPL W P-5'-P-CCNC: 31 U/L (ref 0–70)
AST SERPL W P-5'-P-CCNC: 22 U/L (ref 0–45)
BASOPHILS # BLD AUTO: 0 10E3/UL (ref 0–0.2)
BASOPHILS NFR BLD AUTO: 1 %
BILIRUB DIRECT SERPL-MCNC: <0.2 MG/DL (ref 0–0.3)
BILIRUB SERPL-MCNC: 0.4 MG/DL
EOSINOPHIL # BLD AUTO: 0.2 10E3/UL (ref 0–0.7)
EOSINOPHIL NFR BLD AUTO: 5 %
ERYTHROCYTE [DISTWIDTH] IN BLOOD BY AUTOMATED COUNT: 13 % (ref 10–15)
HCT VFR BLD AUTO: 43.3 % (ref 40–53)
HGB BLD-MCNC: 14.1 G/DL (ref 13.3–17.7)
IMM GRANULOCYTES # BLD: 0 10E3/UL
IMM GRANULOCYTES NFR BLD: 0 %
LYMPHOCYTES # BLD AUTO: 1.3 10E3/UL (ref 0.8–5.3)
LYMPHOCYTES NFR BLD AUTO: 26 %
MCH RBC QN AUTO: 29.3 PG (ref 26.5–33)
MCHC RBC AUTO-ENTMCNC: 32.6 G/DL (ref 31.5–36.5)
MCV RBC AUTO: 90 FL (ref 78–100)
MONOCYTES # BLD AUTO: 0.5 10E3/UL (ref 0–1.3)
MONOCYTES NFR BLD AUTO: 10 %
NEUTROPHILS # BLD AUTO: 2.9 10E3/UL (ref 1.6–8.3)
NEUTROPHILS NFR BLD AUTO: 58 %
PLATELET # BLD AUTO: 335 10E3/UL (ref 150–450)
PROT SERPL-MCNC: 7.4 G/DL (ref 6.4–8.3)
RBC # BLD AUTO: 4.82 10E6/UL (ref 4.4–5.9)
WBC # BLD AUTO: 5 10E3/UL (ref 4–11)

## 2024-06-25 PROCEDURE — 80076 HEPATIC FUNCTION PANEL: CPT | Performed by: FAMILY MEDICINE

## 2024-06-25 PROCEDURE — 85025 COMPLETE CBC W/AUTO DIFF WBC: CPT | Performed by: FAMILY MEDICINE

## 2024-06-25 PROCEDURE — 36415 COLL VENOUS BLD VENIPUNCTURE: CPT | Performed by: FAMILY MEDICINE

## 2024-06-25 PROCEDURE — 99213 OFFICE O/P EST LOW 20 MIN: CPT | Performed by: FAMILY MEDICINE

## 2024-06-25 ASSESSMENT — PAIN SCALES - GENERAL: PAINLEVEL: NO PAIN (0)

## 2024-06-25 NOTE — PATIENT INSTRUCTIONS
Try Sarna cream, cool compresses for itch control.    Consider double rinse cycle on your clothing washer.

## 2024-06-25 NOTE — PROGRESS NOTES
"  Assessment & Plan     Itching  Diffuse and persistent/ongoing pruritus of upper torso and scalp with normal-appearing skin.  He's already tried gentle detergents and no new personal care products.   I recommended topical cooling agents such as cool compresses or Sarna cream.  We'll check liver enzymes and CBC but I anticipate these will be normal.  If it persists we could consider referral to dermatology or allergy.    - Hepatic panel (Albumin, ALT, AST, Bili, Alk Phos, TP)  - CBC with platelets and differential      Subjective   Margaret is a 59 year old, presenting for the following health issues:  Derm Problem (Itching skin for the last 3 months )        6/25/2024    10:49 AM   Additional Questions   Roomed by Gisell Clarke     History of Present Illness       Reason for visit:  Itches on the skin of an unxplained cause  Symptom onset:  More than a month  Symptoms include:  Itching in the upper body (chest and up) plus the scalp  Symptom intensity:  Moderate  Symptom progression:  Staying the same  Had these symptoms before:  No  What makes it worse:  Nothing that I can think of  What makes it better:  Applying an external itch control ointment/creamHe consumes 0 sweetened beverage(s) daily.He exercises with enough effort to increase his heart rate 20 to 29 minutes per day.  He exercises with enough effort to increase his heart rate 5 days per week.   He is taking medications regularly.     Itchy skin x 3 months  Upper torso - front and back, also scalp  Pretty severe - it wakes him up from sleep  Waxes and wanes - some days without any itch.  No visible rash  He has eliminated various detergents - using only \"free and clear\"  No new personal care products or foods.  No new symptoms: no fevers, no abdominal pain  Has used Benadryl cream which dulls the itching but only temporarily   Has tried oral benadryl and Zyrtec with no improvement in symptoms   Itch was present before he got new cat.  He has a history of " "Urticaria for which he uses Zyrtec but this itch has no rash.        Objective    /87 (BP Location: Right arm, Patient Position: Sitting, Cuff Size: Adult Large)   Pulse 88   Temp 97.8  F (36.6  C) (Temporal)   Resp 16   Ht 1.79 m (5' 10.47\")   Wt 92.5 kg (204 lb)   SpO2 95%   BMI 28.88 kg/m    Body mass index is 28.88 kg/m .  Physical Exam   GEN:  no apparent distress  EYES: sclerae anicteric  LUNGS:  normal respiratory effort, and lungs clear to auscultation bilaterally - no rales, rhonchi or wheezes  CV: regular rate and rhythm, normal S1 S2, no S3 or S4, and no murmur, click or rub  ABD:  soft, nontender, no mass, no hepatosplenomegaly  SKIN:  few excoriations across upper chest, no rash, no xerosis          Signed Electronically by: Danielle Orlando MD    "

## 2024-06-26 NOTE — RESULT ENCOUNTER NOTE
Tj Robles,  Your hepatic panel results (liver tests) and CBC (blood cell counts) are all normal.  Hopefully the itching will settle down.  If not, let me know and I will refer you to dermatology.   Danielle Orlando MD

## 2024-07-23 ENCOUNTER — OFFICE VISIT (OUTPATIENT)
Dept: URGENT CARE | Facility: URGENT CARE | Age: 59
End: 2024-07-23
Payer: COMMERCIAL

## 2024-07-23 VITALS
TEMPERATURE: 97.9 F | OXYGEN SATURATION: 96 % | SYSTOLIC BLOOD PRESSURE: 149 MMHG | RESPIRATION RATE: 16 BRPM | HEART RATE: 95 BPM | DIASTOLIC BLOOD PRESSURE: 93 MMHG

## 2024-07-23 DIAGNOSIS — L03.011 CELLULITIS OF FINGER OF RIGHT HAND: ICD-10-CM

## 2024-07-23 DIAGNOSIS — L03.011 PARONYCHIA OF RIGHT MIDDLE FINGER: Primary | ICD-10-CM

## 2024-07-23 PROCEDURE — 10060 I&D ABSCESS SIMPLE/SINGLE: CPT | Performed by: FAMILY MEDICINE

## 2024-07-23 RX ORDER — CEPHALEXIN 500 MG/1
500 CAPSULE ORAL 3 TIMES DAILY
Qty: 21 CAPSULE | Refills: 0 | Status: SHIPPED | OUTPATIENT
Start: 2024-07-23 | End: 2024-07-30

## 2024-07-23 NOTE — PATIENT INSTRUCTIONS
Please finish the antibiotic even if you are feeling better.  Watch for worsening signs of infection such as increasing redness increasing warmth increasing pain and any red streaking    Ema Nevarez MD

## 2024-07-23 NOTE — PROGRESS NOTES
Chief Complaint   Patient presents with    Urgent Care    Infection    Finger     Patient presents with his middle right finger red and swollen.      Margaret was seen today for urgent care, infection and finger.    Diagnoses and all orders for this visit:    Paronychia of right middle finger    Cellulitis of finger of right hand  -     cephALEXin (KEFLEX) 500 MG capsule; Take 1 capsule (500 mg) by mouth 3 times daily for 7 days      ASSESSMENT:   Paronychia    PLAN:  See orders in epic  Change dressing every 24 hrs   Otc analgesics for the pain   Continue on topical bacitracin   Also do keflex to help with the pain     Procedure Note:     The finger was first soaked in warm, soapy water. the distal finger was prepped with alcohol and under clean conditions,Ethyl chloride was used.  abscess was incised with #11 blade.  purulent fluid was drained.  No complications.  Area was then bandaged. Digital block was not used.  As there was redness noted would consider treating with keflex     Ema Nevarez MD       SUBJECTIVE:  Margaret Izaguirre is a 59 year old male who presents complaining of right third finger pain.  He has noted some redness and swelling along the cuticle margin.  Symptoms began yesterday.   Severity: moderate.  He denies any trauma to the area.  No fevers or chills noted.  No migration of redness or swelling proximally.    Past Medical History:   Diagnosis Date    Adenomatous polyp of colon     4/2015 - rpt in 3 yrs.    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 Gerber, [facility]    Obsessive-compulsive personality disorder (H)     Rhabdomyolysis 12/10     [facility]     Current Outpatient Medications   Medication Sig Dispense Refill    cephALEXin (KEFLEX) 500 MG capsule Take 1 capsule (500 mg) by mouth 3 times daily for 7 days 21 capsule 0    cetirizine (ZYRTEC) 10 MG tablet Take 1 tablet (10 mg) by mouth daily      IBUPROFEN 200 MG PO CAPS 2 capsules 3 - 4 times/day PRN      milnacipran (SAVELLA) 100 MG TABS tablet TAKE 1 TABLET(100 MG) BY MOUTH TWICE DAILY 180 tablet 1    pregabalin (LYRICA) 150 MG capsule TAKE 1 CAPSULE(150 MG) BY MOUTH TWICE DAILY 180 capsule 1    tiZANidine (ZANAFLEX) 4 MG tablet Take 2 tablets (8 mg) by mouth at bedtime daily.  May use a 3rd tab during the night as needed. 190 tablet 1     Social History     Tobacco Use    Smoking status: Never    Smokeless tobacco: Never   Substance Use Topics    Alcohol use: Yes     Comment: Very occasional use (once every few months)       ROS:  Review of Systems  Complete ROS negative except as stated above    OBJECTIVE:  BP (!) 149/93 (BP Location: Right arm)   Pulse 95   Temp 97.9  F (36.6  C) (Temporal)   Resp 16   SpO2 96%   Hand exam:  examination of third finger reveals paronychia with redness, tenderness and swelling along distal finger.      Ema Nevarez MD

## 2024-09-04 ENCOUNTER — MYC MEDICAL ADVICE (OUTPATIENT)
Dept: FAMILY MEDICINE | Facility: CLINIC | Age: 59
End: 2024-09-04
Payer: COMMERCIAL

## 2024-09-04 DIAGNOSIS — G89.4 CHRONIC PAIN SYNDROME: ICD-10-CM

## 2024-09-04 DIAGNOSIS — M79.7 FIBROMYALGIA: ICD-10-CM

## 2024-09-04 DIAGNOSIS — G93.32 CHRONIC FATIGUE SYNDROME: ICD-10-CM

## 2024-09-04 DIAGNOSIS — F33.42 RECURRENT MAJOR DEPRESSIVE DISORDER, IN FULL REMISSION (H): ICD-10-CM

## 2024-09-05 NOTE — TELEPHONE ENCOUNTER
Pended rx.    Dr. Orlando, I hope you have been well. I have a travel prescription request for Savella 100mg.     I will be leaving for [facility] on September 22 and returning to [place] on October 3. Among the prescriptions I take, I will run out of Savella (100mg) while traveling because, according to the [facility] that I use,  my next refill is not available until October 1, at which time I will still be in [place] (please see the attached screenshot of the [facility] pharmacy).      Would it be possible for me to receive a prescription for a 7-day supply of Savella? This way (a) I will not run out until I come home on October 3 and then (b) refill the Rx in full, which tends to take a few days because my pharmacy often does not have Savella in stock.     My pharmacy is [facility] in [place] (on [address] [facility]).     Thank you in advance for your assistance.    CECIL OlivaN, PHN, RN  [facility]  [phone]

## 2024-10-05 SDOH — HEALTH STABILITY: PHYSICAL HEALTH: ON AVERAGE, HOW MANY MINUTES DO YOU ENGAGE IN EXERCISE AT THIS LEVEL?: 30 MIN

## 2024-10-05 SDOH — HEALTH STABILITY: PHYSICAL HEALTH: ON AVERAGE, HOW MANY DAYS PER WEEK DO YOU ENGAGE IN MODERATE TO STRENUOUS EXERCISE (LIKE A BRISK WALK)?: 7 DAYS

## 2024-10-05 ASSESSMENT — SOCIAL DETERMINANTS OF HEALTH (SDOH): HOW OFTEN DO YOU GET TOGETHER WITH FRIENDS OR RELATIVES?: ONCE A WEEK

## 2024-10-10 ENCOUNTER — OFFICE VISIT (OUTPATIENT)
Dept: FAMILY MEDICINE | Facility: CLINIC | Age: 59
End: 2024-10-10
Attending: FAMILY MEDICINE
Payer: COMMERCIAL

## 2024-10-10 ENCOUNTER — ANCILLARY PROCEDURE (OUTPATIENT)
Dept: GENERAL RADIOLOGY | Facility: CLINIC | Age: 59
End: 2024-10-10
Attending: FAMILY MEDICINE
Payer: COMMERCIAL

## 2024-10-10 VITALS
WEIGHT: 199.5 LBS | RESPIRATION RATE: 20 BRPM | DIASTOLIC BLOOD PRESSURE: 78 MMHG | TEMPERATURE: 98.4 F | HEART RATE: 94 BPM | BODY MASS INDEX: 28.56 KG/M2 | SYSTOLIC BLOOD PRESSURE: 124 MMHG | HEIGHT: 70 IN | OXYGEN SATURATION: 97 %

## 2024-10-10 DIAGNOSIS — M25.561 ACUTE PAIN OF RIGHT KNEE: ICD-10-CM

## 2024-10-10 DIAGNOSIS — M79.7 FIBROMYALGIA: ICD-10-CM

## 2024-10-10 DIAGNOSIS — Z00.00 ENCOUNTER FOR MEDICARE ANNUAL WELLNESS EXAM: Primary | ICD-10-CM

## 2024-10-10 DIAGNOSIS — Z51.81 MEDICATION MONITORING ENCOUNTER: ICD-10-CM

## 2024-10-10 DIAGNOSIS — G93.32 CHRONIC FATIGUE SYNDROME: ICD-10-CM

## 2024-10-10 DIAGNOSIS — G89.4 CHRONIC PAIN SYNDROME: ICD-10-CM

## 2024-10-10 DIAGNOSIS — F33.42 RECURRENT MAJOR DEPRESSIVE DISORDER, IN FULL REMISSION (H): ICD-10-CM

## 2024-10-10 DIAGNOSIS — M22.2X1 PATELLOFEMORAL PAIN SYNDROME OF RIGHT KNEE: ICD-10-CM

## 2024-10-10 LAB
ANION GAP SERPL CALCULATED.3IONS-SCNC: 10 MMOL/L (ref 7–15)
BUN SERPL-MCNC: 17.2 MG/DL (ref 8–23)
CALCIUM SERPL-MCNC: 9.5 MG/DL (ref 8.8–10.4)
CHLORIDE SERPL-SCNC: 101 MMOL/L (ref 98–107)
CREAT SERPL-MCNC: 0.83 MG/DL (ref 0.67–1.17)
EGFRCR SERPLBLD CKD-EPI 2021: >90 ML/MIN/1.73M2
GLUCOSE SERPL-MCNC: 110 MG/DL (ref 70–99)
HCO3 SERPL-SCNC: 27 MMOL/L (ref 22–29)
POTASSIUM SERPL-SCNC: 4.4 MMOL/L (ref 3.4–5.3)
SODIUM SERPL-SCNC: 138 MMOL/L (ref 135–145)

## 2024-10-10 PROCEDURE — 99214 OFFICE O/P EST MOD 30 MIN: CPT | Mod: 25 | Performed by: FAMILY MEDICINE

## 2024-10-10 PROCEDURE — 73562 X-RAY EXAM OF KNEE 3: CPT | Mod: TC | Performed by: RADIOLOGY

## 2024-10-10 PROCEDURE — 36415 COLL VENOUS BLD VENIPUNCTURE: CPT | Performed by: FAMILY MEDICINE

## 2024-10-10 PROCEDURE — 80048 BASIC METABOLIC PNL TOTAL CA: CPT | Performed by: FAMILY MEDICINE

## 2024-10-10 PROCEDURE — 99396 PREV VISIT EST AGE 40-64: CPT | Performed by: FAMILY MEDICINE

## 2024-10-10 RX ORDER — PREGABALIN 150 MG/1
CAPSULE ORAL
Qty: 180 CAPSULE | Refills: 1 | Status: SHIPPED | OUTPATIENT
Start: 2024-10-10

## 2024-10-10 ASSESSMENT — PATIENT HEALTH QUESTIONNAIRE - PHQ9
10. IF YOU CHECKED OFF ANY PROBLEMS, HOW DIFFICULT HAVE THESE PROBLEMS MADE IT FOR YOU TO DO YOUR WORK, TAKE CARE OF THINGS AT HOME, OR GET ALONG WITH OTHER PEOPLE: SOMEWHAT DIFFICULT
SUM OF ALL RESPONSES TO PHQ QUESTIONS 1-9: 2
SUM OF ALL RESPONSES TO PHQ QUESTIONS 1-9: 2

## 2024-10-10 ASSESSMENT — PAIN SCALES - GENERAL: PAINLEVEL: MODERATE PAIN (5)

## 2024-10-10 NOTE — RESULT ENCOUNTER NOTE
Tj Robles,  Good news!  Your xrays show no evidence of degenerative joint disease (osteoarthritis) in the knee.  The cause of your pain is likely patellofemoral syndrome which is due to mechanical misalignment of the knee cap - often due to imbalance of the quadriceps muscles.  The treatment is Physical Therapy (PT).      I placed a referral for you to see Physical Therapy.  If you don't hear from a representative within 2 business days, please call [phone] to schedule.  If pain persists despite PT the next step would be a consultation with Ortho - usually Sports Medicine (non-surgical Orthopedics doctor).    Danielle Orlando MD

## 2024-10-10 NOTE — PROGRESS NOTES
Preventive Care Visit  [facility]  Danielle Orlando MD, Family Medicine  Oct 10, 2024      Assessment & Plan     Encounter for Medicare annual wellness exam  Reviewed/updated Health Maintenance     Chronic fatigue syndrome  Fibromyalgia  Chronic pain syndrome  stable/adequately controlled - Continue current medication regimen.   - milnacipran (SAVELLA) 100 MG TABS tablet  Dispense: 180 tablet; Refill: 1  - pregabalin (LYRICA) 150 MG capsule  Dispense: 180 capsule; Refill: 1  - tiZANidine (ZANAFLEX) 4 MG tablet  Dispense: 190 tablet; Refill: 1    Recurrent major depressive disorder, in full remission (H)  stable/well controlled - Continue current medication regimen.   - milnacipran (SAVELLA) 100 MG TABS tablet  Dispense: 180 tablet; Refill: 1    Medication monitoring encounter  - Basic metabolic panel  (Ca, Cl, CO2, Creat, Gluc, K, Na, BUN)    Acute pain of right knee  Most likely patellofemoral syndrome but OA/DJD a consideration given focal tenderness of medial joint line.  We'll get xrays.   - XR Knee Right 3 Views      Counseling  Appropriate preventive services were addressed with this patient via screening, questionnaire, or discussion as appropriate for fall prevention, nutrition, physical activity, Tobacco-use cessation, social engagement, weight loss and cognition.  Checklist reviewing preventive services available has been given to the patient.  Reviewed patient's diet, addressing concerns and/or questions.   He is at risk for psychosocial distress and has been provided with information to reduce risk.   Information on urinary incontinence and treatment options given to patient.  (He notes mild post-void dribbling with no other obstructive symptoms.)      See Patient Instructions        Arielle Robles is a 59 year old, presenting for the following:  Medicare Visit and Knee Pain (For pass 3 weeks)        10/10/2024    11:44 AM   Additional Questions   Roomed by Delmy    Accompanied by alone         10/10/2024    11:44 AM   Patient Reported Additional Medications   Patient reports taking the following new medications none       Health Care Directive  Patient does not have a Health Care Directive or Living Will: Discussed advance care planning with patient; information given to patient to review.    Knee Pain    Started 3 weeks ago - before going to [place]  Got worse when he was in [place]  Use some ibuprofen and pain patches  Returned from [place] a week ago  Anterior knee  Intermittent  Worse bearing weight on it  No locking, catching, or give-way     CFS/Fibromyalgia - Reasonably controlled on current regimen.  He usually has a CFS flare after transoceanic travel but he has done surprisingly well since he got back from [place] a week ago - no profound crash in energy.          4/9/2024     1:00 PM 6/24/2024    11:42 AM 10/10/2024    11:35 AM   PHQ   PHQ-9 Total Score 7 3 2   Q9: Thoughts of better off dead/self-harm past 2 weeks Not at all Not at all Not at all              10/5/2024   General Health   How would you rate your overall physical health? (!) FAIR   Feel stress (tense, anxious, or unable to sleep) Only a little      (!) STRESS CONCERN      10/5/2024   Nutrition   Diet: Regular (no restrictions)            10/5/2024   Exercise   Days per week of moderate/strenous exercise 7 days   Average minutes spent exercising at this level 30 min            10/5/2024   Social Factors   Frequency of gathering with friends or relatives Once a week   Worry food won't last until get money to buy more No   Food not last or not have enough money for food? No   Do you have housing? (Housing is defined as stable permanent housing and does not include staying ouside in a car, in a tent, in an abandoned building, in an overnight shelter, or couch-surfing.) Yes   Are you worried about losing your housing? No   Lack of transportation? No   Unable to get utilities (heat,electricity)? No             10/5/2024   Fall Risk   Fallen 2 or more times in the past year? No   Trouble with walking or balance? No             10/5/2024   Activities of Daily Living- Home Safety   Needs help with the following daily activites None of the above   Safety concerns in the home None of the above            10/5/2024   Dental   Dentist two times every year? Yes            10/5/2024   Hearing Screening   Hearing concerns? None of the above            10/5/2024   Driving Risk Screening   Patient/family members have concerns about driving No            10/5/2024   General Alertness/Fatigue Screening   Have you been more tired than usual lately? No            10/5/2024   Urinary Incontinence Screening   Bothered by leaking urine in past 6 months Yes            10/5/2024   TB Screening   Were you born outside of the [place]? Yes          Today's PHQ-9 Score:       10/10/2024    11:35 AM   PHQ-9 SCORE   PHQ-9 Total Score MyChart 2 (Minimal depression)   PHQ-9 Total Score 2         10/5/2024   Substance Use   Alcohol more than 3/day or more than 7/wk Not Applicable   Do you have a current opioid prescription? No   How severe/bad is pain from 1 to 10? 7/10   Do you use any other substances recreationally? No        Social History     Tobacco Use    Smoking status: Never    Smokeless tobacco: Never   Vaping Use    Vaping status: Never Used   Substance Use Topics    Alcohol use: Yes     Comment: Very occasional use (once every few months)    Drug use: No       Last PSA:   PSA   Date Value Ref Range Status   04/25/2018 0.87 0 - 4 ug/L Final     Comment:     Assay Method:  Chemiluminescence using Siemens Vista analyzer     Prostate Specific Antigen Screen   Date Value Ref Range Status   10/02/2023 0.69 0.00 - 4.50 ng/mL Final   08/15/2022 1.02 0.00 - 4.00 ug/L Final     ASCVD Risk   The 10-year ASCVD risk score (Shasta MAST, et al., 2019) is: 8.6%    Values used to calculate the score:      Age: 59 years      Sex: Male      Is Non-  : No      Diabetic: No      Tobacco smoker: No      Systolic Blood Pressure: 124 mmHg      Is BP treated: No      HDL Cholesterol: 41 mg/dL      Total Cholesterol: 164 mg/dL          Reviewed and updated as needed this visit by Provider   Tobacco  Allergies  Meds  Problems  Med Hx  Surg Hx  Fam Hx     Sexual Activity          BP Readings from Last 3 Encounters:   10/10/24 124/78   07/23/24 (!) 149/93   06/25/24 134/87    Wt Readings from Last 3 Encounters:   10/10/24 90.5 kg (199 lb 8 oz)   06/25/24 92.5 kg (204 lb)   11/07/23 86.2 kg (190 lb)                  Patient Active Problem List   Diagnosis    Chronic fatigue syndrome    Fibromyalgia    Allergic urticaria    Chronic pain syndrome    Memory loss    Major depression    Obstructive sleep apnea    Chronic nasal congestion    Delayed sleep phase syndrome    Adenomatous polyp of colon    Chronic urticaria    Recurrent major depressive disorder, in full remission (H)    Dyspepsia     Past Surgical History:   Procedure Laterality Date    COLONOSCOPY  05/06/2005    WNL    COLONOSCOPY      ENT SURGERY  7/21/2017    WISDOM TOOTH EXTRACTION  2005       Social History     Tobacco Use    Smoking status: Never    Smokeless tobacco: Never   Substance Use Topics    Alcohol use: Yes     Comment: Very occasional use (once every few months)     Family History   Problem Relation Age of Onset    Depression Mother     Neurologic Disorder Father         ALS    Cerebrovascular Disease Father     Cerebrovascular Disease Paternal Grandfather     Depression Maternal Aunt     Depression Other         cousins    Mental Illness Cousin          Current providers sharing in care for this patient include:  Patient Care Team:  Danielle Orlando MD as PCP - General (Family Medicine)  Juli Olivia PA-C as Assigned Sleep Provider  Danielle Orlando MD as Assigned PCP    The following health maintenance items are reviewed in Epic and correct as of  "today:  Health Maintenance   Topic Date Due    ANNUAL REVIEW OF HM ORDERS  07/27/2022    CMP  10/02/2024    PHQ-9  04/10/2025    PSA  10/02/2025    MEDICARE ANNUAL WELLNESS VISIT  10/10/2025    LIPID  12/21/2025    COLORECTAL CANCER SCREENING  07/21/2026    GLUCOSE  10/02/2026    DTAP/TDAP/TD IMMUNIZATION (4 - Td or Tdap) 05/15/2029    ADVANCE CARE PLANNING  10/10/2029    HEPATITIS C SCREENING  Completed    HIV SCREENING  Completed    DEPRESSION ACTION PLAN  Completed    INFLUENZA VACCINE  Completed    ZOSTER IMMUNIZATION  Completed    RSV VACCINE  Completed    COVID-19 Vaccine  Completed    Pneumococcal Vaccine: Pediatrics (0 to 5 Years) and At-Risk Patients (6 to 64 Years)  Aged Out    HPV IMMUNIZATION  Aged Out    MENINGITIS IMMUNIZATION  Aged Out    RSV MONOCLONAL ANTIBODY  Aged Out            Objective    Exam  /78 (BP Location: Right arm, Patient Position: Sitting, Cuff Size: Adult Regular)   Pulse 94   Temp 98.4  F (36.9  C) (Temporal)   Resp 20   Ht 1.79 m (5' 10.47\")   Wt 90.5 kg (199 lb 8 oz)   SpO2 97%   BMI 28.24 kg/m     Estimated body mass index is 28.24 kg/m  as calculated from the following:    Height as of this encounter: 1.79 m (5' 10.47\").    Weight as of this encounter: 90.5 kg (199 lb 8 oz).    Physical Exam  GENERAL: healthy, alert and no distress  EYES: Eyes grossly normal to inspection, conjunctivae and sclerae normal  HENT: ear canals and TM's normal  NECK: no adenopathy, no asymmetry, masses, or scars and thyroid normal to palpation  RESP: lungs clear to auscultation - no rales, rhonchi or wheezes  CV: regular rate and rhythm, normal S1 S2, no S3 or S4, no murmur, click or rub  ABDOMEN: soft, nontender, no hepatosplenomegaly, no masses  MS: no gross musculoskeletal defects noted, no edema  SKIN: no suspicious lesions or rashes  NEURO: Grossly normal strength and tone, mentation intact and speech normal  PSYCH: mentation appears normal, affect normal/bright  KNEE:  right knee " with no effusion, erythema, or warmth.  Some focal tenderness to palpation over the medial joint line and no tenderness to palpation over the lateral joint line, patellar facets, LCL, tibial tuberosity.  No pain with patellar grind..  Negative Anterior Drawer.  Full range of motion.  No pain with hyperflexion.         10/10/2024   Mini Cog   Clock Draw Score 2 Normal   3 Item Recall 2 objects recalled   Mini Cog Total Score 4                 Signed Electronically by: Danielle Orlando MD

## 2024-10-10 NOTE — PATIENT INSTRUCTIONS
Patient Education   Preventive Care Advice   This is general advice given by our system to help you stay healthy. However, your care team may have specific advice just for you. Please talk to your care team about your preventive care needs.  Nutrition  Eat 5 or more servings of fruits and vegetables each day.  Try wheat bread, brown rice and whole grain pasta (instead of white bread, rice, and pasta).  Get enough calcium and vitamin D. Check the label on foods and aim for 100% of the RDA (recommended daily allowance).  Lifestyle  Exercise at least 150 minutes each week  (30 minutes a day, 5 days a week).  Do muscle strengthening activities 2 days a week. These help control your weight and prevent disease.  No smoking.  Wear sunscreen to prevent skin cancer.  Have a dental exam and cleaning every 6 months.  Yearly exams  See your health care team every year to talk about:  Any changes in your health.  Any medicines your care team has prescribed.  Preventive care, family planning, and ways to prevent chronic diseases.  Shots (vaccines)   HPV shots (up to age 26), if you've never had them before.  Hepatitis B shots (up to age 59), if you've never had them before.  COVID-19 shot: Get this shot when it's due.  Flu shot: Get a flu shot every year.  Tetanus shot: Get a tetanus shot every 10 years.  Pneumococcal, hepatitis A, and RSV shots: Ask your care team if you need these based on your risk.  Shingles shot (for age 50 and up)  General health tests  Diabetes screening:  Starting at age 35, Get screened for diabetes at least every 3 years.  If you are younger than age 35, ask your care team if you should be screened for diabetes.  Cholesterol test: At age 39, start having a cholesterol test every 5 years, or more often if advised.  Bone density scan (DEXA): At age 50, ask your care team if you should have this scan for osteoporosis (brittle bones).  Hepatitis C: Get tested at least once in your life.  STIs (sexually  transmitted infections)  Before age 24: Ask your care team if you should be screened for STIs.  After age 24: Get screened for STIs if you're at risk. You are at risk for STIs (including HIV) if:  You are sexually active with more than one person.  You don't use condoms every time.  You or a partner was diagnosed with a sexually transmitted infection.  If you are at risk for HIV, ask about PrEP medicine to prevent HIV.  Get tested for HIV at least once in your life, whether you are at risk for HIV or not.  Cancer screening tests  Cervical cancer screening: If you have a cervix, begin getting regular cervical cancer screening tests starting at age 21.  Breast cancer scan (mammogram): If you've ever had breasts, begin having regular mammograms starting at age 40. This is a scan to check for breast cancer.  Colon cancer screening: It is important to start screening for colon cancer at age 45.  Have a colonoscopy test every 10 years (or more often if you're at risk) Or, ask your provider about stool tests like a FIT test every year or Cologuard test every 3 years.  To learn more about your testing options, visit:   .  For help making a decision, visit:   https://bit.ly/za42777.  Prostate cancer screening test: If you have a prostate, ask your care team if a prostate cancer screening test (PSA) at age 55 is right for you.  Lung cancer screening: If you are a current or former smoker ages 50 to 80, ask your care team if ongoing lung cancer screenings are right for you.  For informational purposes only. Not to replace the advice of your health care provider. Copyright   2023 [employer]. All rights reserved. Clinically reviewed by the [facility] Transitions Program. [employer] [id number] - REV 01/24.

## 2024-10-11 NOTE — RESULT ENCOUNTER NOTE
Tj Robles,  Your blood sugar is a bit elevated (assuming you hadn't eaten for 3 hours prior to the lab draw).  Everything else on the basic metabolic panel is normal.    Danielle Orlando MD

## 2024-10-12 ASSESSMENT — ACTIVITIES OF DAILY LIVING (ADL)
STAND: ACTIVITY IS NOT DIFFICULT
SWELLING: I DO NOT HAVE THE SYMPTOM
KNEE_ACTIVITY_OF_DAILY_LIVING_SCORE: 71.43
WEAKNESS: I HAVE THE SYMPTOM BUT IT DOES NOT AFFECT MY ACTIVITY
LIMPING: THE SYMPTOM AFFECTS MY ACTIVITY SLIGHTLY
WALK: ACTIVITY IS SOMEWHAT DIFFICULT
HOW_WOULD_YOU_RATE_THE_OVERALL_FUNCTION_OF_YOUR_KNEE_DURING_YOUR_USUAL_DAILY_ACTIVITIES?: ABNORMAL
PLEASE_INDICATE_YOR_PRIMARY_REASON_FOR_REFERRAL_TO_THERAPY:: KNEE
STIFFNESS: THE SYMPTOM AFFECTS MY ACTIVITY SLIGHTLY
HOW_WOULD_YOU_RATE_THE_CURRENT_FUNCTION_OF_YOUR_KNEE_DURING_YOUR_USUAL_DAILY_ACTIVITIES_ON_A_SCALE_FROM_0_TO_100_WITH_100_BEING_YOUR_LEVEL_OF_KNEE_FUNCTION_PRIOR_TO_YOUR_INJURY_AND_0_BEING_THE_INABILITY_TO_PERFORM_ANY_OF_YOUR_USUAL_DAILY_ACTIVITIES?: 75
KNEE_ACTIVITY_OF_DAILY_LIVING_SUM: 50
SIT WITH YOUR KNEE BENT: ACTIVITY IS SOMEWHAT DIFFICULT
WALK: ACTIVITY IS SOMEWHAT DIFFICULT
PAIN: THE SYMPTOM AFFECTS MY ACTIVITY MODERATELY
LIMPING: THE SYMPTOM AFFECTS MY ACTIVITY SLIGHTLY
AS_A_RESULT_OF_YOUR_KNEE_INJURY,_HOW_WOULD_YOU_RATE_YOUR_CURRENT_LEVEL_OF_DAILY_ACTIVITY?: ABNORMAL
KNEEL ON THE FRONT OF YOUR KNEE: ACTIVITY IS MINIMALLY DIFFICULT
GO DOWN STAIRS: ACTIVITY IS SOMEWHAT DIFFICULT
RISE FROM A CHAIR: ACTIVITY IS NOT DIFFICULT
HOW_WOULD_YOU_RATE_THE_OVERALL_FUNCTION_OF_YOUR_KNEE_DURING_YOUR_USUAL_DAILY_ACTIVITIES?: ABNORMAL
WEAKNESS: I HAVE THE SYMPTOM BUT IT DOES NOT AFFECT MY ACTIVITY
STIFFNESS: THE SYMPTOM AFFECTS MY ACTIVITY SLIGHTLY
GIVING WAY, BUCKLING OR SHIFTING OF KNEE: THE SYMPTOM AFFECTS MY ACTIVITY SLIGHTLY
PAIN: THE SYMPTOM AFFECTS MY ACTIVITY MODERATELY
GO UP STAIRS: ACTIVITY IS SOMEWHAT DIFFICULT
GIVING WAY, BUCKLING OR SHIFTING OF KNEE: THE SYMPTOM AFFECTS MY ACTIVITY SLIGHTLY
GO DOWN STAIRS: ACTIVITY IS SOMEWHAT DIFFICULT
STAND: ACTIVITY IS NOT DIFFICULT
HOW_WOULD_YOU_RATE_THE_CURRENT_FUNCTION_OF_YOUR_KNEE_DURING_YOUR_USUAL_DAILY_ACTIVITIES_ON_A_SCALE_FROM_0_TO_100_WITH_100_BEING_YOUR_LEVEL_OF_KNEE_FUNCTION_PRIOR_TO_YOUR_INJURY_AND_0_BEING_THE_INABILITY_TO_PERFORM_ANY_OF_YOUR_USUAL_DAILY_ACTIVITIES?: 75
GO UP STAIRS: ACTIVITY IS SOMEWHAT DIFFICULT
SQUAT: ACTIVITY IS MINIMALLY DIFFICULT
SQUAT: ACTIVITY IS MINIMALLY DIFFICULT
AS_A_RESULT_OF_YOUR_KNEE_INJURY,_HOW_WOULD_YOU_RATE_YOUR_CURRENT_LEVEL_OF_DAILY_ACTIVITY?: ABNORMAL
SIT WITH YOUR KNEE BENT: ACTIVITY IS SOMEWHAT DIFFICULT
RISE FROM A CHAIR: ACTIVITY IS NOT DIFFICULT
RAW_SCORE: 50
KNEEL ON THE FRONT OF YOUR KNEE: ACTIVITY IS MINIMALLY DIFFICULT
SWELLING: I DO NOT HAVE THE SYMPTOM

## 2024-10-17 ENCOUNTER — THERAPY VISIT (OUTPATIENT)
Dept: PHYSICAL THERAPY | Facility: CLINIC | Age: 59
End: 2024-10-17
Attending: FAMILY MEDICINE
Payer: COMMERCIAL

## 2024-10-17 DIAGNOSIS — M22.2X1 PATELLOFEMORAL PAIN SYNDROME OF RIGHT KNEE: ICD-10-CM

## 2024-10-17 PROCEDURE — 97110 THERAPEUTIC EXERCISES: CPT | Mod: GP

## 2024-10-17 PROCEDURE — 97530 THERAPEUTIC ACTIVITIES: CPT | Mod: GP

## 2024-10-17 PROCEDURE — 97161 PT EVAL LOW COMPLEX 20 MIN: CPT | Mod: GP

## 2024-10-17 NOTE — PROGRESS NOTES
PHYSICAL THERAPY EVALUATION  Type of Visit: Evaluation        Fall Risk Screen:  Fall screen completed by: PT  Have you fallen 2 or more times in the past year?: No  Have you fallen and had an injury in the past year?: No  Is patient a fall risk?: No    Subjective       Presenting condition or subjective complaint: Knee pain and difficulty walking (at times). Started 1 month ago idiopathic pain. Has had some R knee pain in the past that comes and goes, this episode is continuous pain. Was traveling recently with increased amounts of walking and walking speed. Returned in October and noticed increased pain. Pain increases 1 hour after elliptical training.   Date of onset: 10/10/24    Relevant medical history: Depression; Dizziness; Fibromyalgia; Pain at night or rest; Sleep disorder like apnea   Dates & types of surgery: Septoturbinoplasty 7/21/2017    Prior diagnostic imaging/testing results: X-ray- unremarkable, no degenerative joint disease.      Prior therapy history for the same diagnosis, illness or injury: No      Living Environment  Social support: With family members   Type of home: [facility]   Stairs to enter the home: No       Ramp: No   Stairs inside the home: Yes 14 Is there a railing: Yes     Help at home: None  Equipment owned:       Employment: No    Hobbies/Interests: Travel, reading, computers, pets    Patient goals for therapy: To be free from the knee pain and discomfort when walking. Would like to return to elliptical for 20-30 minutes daily      Objective   KNEE EVALUATION  Pain spikes and then goes down to a dull ache around 2-3/10  PAIN: Pain Level at Rest: 0/10  Pain Level with Use: 8/10  Pain Location: Right knee in medial anterior knee, deep in the joint. Has buckling sensation. Denies clicking or popping.   Pain Quality: Sharp and dull ache  Pain Frequency: constant  Pain is Worst: nighttime  Pain is Exacerbated By: activity, pivoting  Pain is Relieved By: NSAIDs, otc medications, and  topical patches and gels   Pain Progression: Unchanged  INTEGUMENTARY (edema, incisions): WNL, maybe slight inflammation in R medial joint  POSTURE: WNL  GAIT:  Gait Deviations: Antalgic    STRENGTH:   Pain: - none + mild ++ moderate +++ severe  Strength Scale: 0-5/5 Left Right   Knee Flexion 5 5   Knee Extension 4, + (mild) 5   Quad Set 5 5   Hip ADD: B 5/5, L painful    SPECIAL TESTS:  Thessaly's test: (+) but SL stance is also provoking    FUNCTIONAL TESTS: Double Leg Squat: Able to perform full deep squat without pain  Single Leg Squat: Knee valgus, Hip internal rotation, and Improper use of glutes/hips. Single leg stance painful on R LE  PALPATION:   + Tenderness At Location Left Right   Medial Joint Line  +   Lateral Joint Line  -   Patellar Tendon  -   IT Band  -   Incisional     Popliteal     Biceps Femoris  -   Semitendinosis  -   Semimembranosis  -   Glut Medius     Patellar Medial  -   Patellar Lateral  -   Patellar Superior  -   Patellar Inferior   -     JOINT MOBILITY: WNL    Assessment & Plan   CLINICAL IMPRESSIONS  Medical Diagnosis: Patellofemoral pain syndrome of right knee    Treatment Diagnosis: Right knee pain,   Impression/Assessment: Patient is a 59 year old male with knee pain limiting walking, exercise, and activities complaints.  The following significant findings have been identified: Pain, Decreased strength, Impaired balance, Impaired gait, Impaired muscle performance, and Decreased activity tolerance. These impairments interfere with their ability to perform self care tasks, recreational activities, and community mobility as compared to previous level of function.     Clinical Decision Making (Complexity):  Clinical Presentation: Stable/Uncomplicated  Clinical Presentation Rationale: based on medical and personal factors listed in PT evaluation  Clinical Decision Making (Complexity): Low complexity    PLAN OF CARE  Treatment Interventions:  Modalities: Cryotherapy, Dry Needling,  Ultrasound, Vasoneumatic Device  Interventions: Manual Therapy, Neuromuscular Re-education, Therapeutic Activity, Therapeutic Exercise, Self-Care/Home Management    Long Term Goals     PT Goal 1  Goal Identifier: Functional mobility  Goal Description: Patient will be able to use elliptical machine for at least 30 minutes without pain or symptoms during or after use for exercise  Rationale: to maximize safety and independence with performance of ADLs and functional tasks;to maximize safety and independence with self cares  Target Date: 01/08/25  PT Goal 2  Goal Identifier: Single leg squat  Goal Description: Patient will perform at lesat 5 single leg squats on right leg with single upper extremity assist with <2/10 pain and no signs of instability  Rationale: to maximize safety and independence with performance of ADLs and functional tasks;to maximize safety and independence within the community;to maximize safety and independence within the home  Target Date: 01/08/25  PT Goal 3  Goal Identifier: Pain  Goal Description: Patient will report no more than 2/10 pain in right knee with desired activities for at least 2 weeks to show improved symtpoms  Rationale: to maximize safety and independence with performance of ADLs and functional tasks  Target Date: 01/08/25      Frequency of Treatment: 1x per 2 weeks for 12 weeks, adjusting frequency as indicated by patient presentation  Duration of Treatment: 12 weeks    Recommended Referrals to Other Professionals:   Education Assessment:   Learner/Method: Patient    Risks and benefits of evaluation/treatment have been explained.   Patient/Family/caregiver agrees with Plan of Care.     Evaluation Time:     PT Eval, Low Complexity Minutes (50808): 20       Signing Clinician: Theresa Burrows PT        [facility]                                                                                   OUTPATIENT PHYSICAL THERAPY      PLAN OF TREATMENT FOR  OUTPATIENT REHABILITATION   Patient's Last Name, First Name, Margaret Green    YOB: 1965   Provider's Name   [facility]   Medical Record No.  [id number]     Onset Date: 10/10/24  Start of Care Date: 10/17/24     Medical Diagnosis:  Patellofemoral pain syndrome of right knee      PT Treatment Diagnosis:  Right knee pain, Plan of Treatment  Frequency/Duration: 1x per 2 weeks for 12 weeks, adjusting frequency as indicated by patient presentation/ 12 weeks    Certification date from 10/17/24 to 01/08/25         See note for plan of treatment details and functional goals     Theresa Burrows, PT                         I CERTIFY THE NEED FOR THESE SERVICES FURNISHED UNDER        THIS PLAN OF TREATMENT AND WHILE UNDER MY CARE     (Physician attestation of this document indicates review and certification of the therapy plan).              Referring Provider:  Danielle Orlando    Initial Assessment  See Epic Evaluation- Start of Care Date: 10/17/24

## 2024-11-04 ENCOUNTER — THERAPY VISIT (OUTPATIENT)
Dept: PHYSICAL THERAPY | Facility: CLINIC | Age: 59
End: 2024-11-04
Attending: FAMILY MEDICINE
Payer: COMMERCIAL

## 2024-11-04 DIAGNOSIS — M22.2X1 PATELLOFEMORAL PAIN SYNDROME OF RIGHT KNEE: Primary | ICD-10-CM

## 2024-11-04 PROCEDURE — 97110 THERAPEUTIC EXERCISES: CPT | Mod: GP

## 2024-11-04 PROCEDURE — 97530 THERAPEUTIC ACTIVITIES: CPT | Mod: GP

## 2024-11-20 ENCOUNTER — THERAPY VISIT (OUTPATIENT)
Dept: PHYSICAL THERAPY | Facility: CLINIC | Age: 59
End: 2024-11-20
Payer: COMMERCIAL

## 2024-11-20 DIAGNOSIS — M22.2X1 PATELLOFEMORAL PAIN SYNDROME OF RIGHT KNEE: Primary | ICD-10-CM

## 2024-11-20 PROCEDURE — 97110 THERAPEUTIC EXERCISES: CPT | Mod: GP

## 2024-11-20 PROCEDURE — 97530 THERAPEUTIC ACTIVITIES: CPT | Mod: GP

## 2024-12-05 ENCOUNTER — THERAPY VISIT (OUTPATIENT)
Dept: PHYSICAL THERAPY | Facility: CLINIC | Age: 59
End: 2024-12-05
Payer: COMMERCIAL

## 2024-12-05 ENCOUNTER — TRANSFERRED RECORDS (OUTPATIENT)
Dept: HEALTH INFORMATION MANAGEMENT | Facility: CLINIC | Age: 59
End: 2024-12-05

## 2024-12-05 DIAGNOSIS — M22.2X1 PATELLOFEMORAL PAIN SYNDROME OF RIGHT KNEE: Primary | ICD-10-CM

## 2024-12-16 ENCOUNTER — TRANSFERRED RECORDS (OUTPATIENT)
Dept: HEALTH INFORMATION MANAGEMENT | Facility: CLINIC | Age: 59
End: 2024-12-16
Payer: COMMERCIAL

## 2024-12-18 PROBLEM — M23.203 DEGENERATIVE TEAR OF MEDIAL MENISCUS OF RIGHT KNEE: Status: ACTIVE | Noted: 2024-12-18

## 2025-03-10 ENCOUNTER — TRANSFERRED RECORDS (OUTPATIENT)
Dept: HEALTH INFORMATION MANAGEMENT | Facility: CLINIC | Age: 60
End: 2025-03-10
Payer: COMMERCIAL

## 2025-03-20 ENCOUNTER — MYC MEDICAL ADVICE (OUTPATIENT)
Dept: FAMILY MEDICINE | Facility: CLINIC | Age: 60
End: 2025-03-20
Payer: COMMERCIAL

## 2025-03-28 PROBLEM — M23.203 DEGENERATIVE TEAR OF MEDIAL MENISCUS OF RIGHT KNEE: Status: ACTIVE | Noted: 2024-12-18

## 2025-04-08 ASSESSMENT — PATIENT HEALTH QUESTIONNAIRE - PHQ9
10. IF YOU CHECKED OFF ANY PROBLEMS, HOW DIFFICULT HAVE THESE PROBLEMS MADE IT FOR YOU TO DO YOUR WORK, TAKE CARE OF THINGS AT HOME, OR GET ALONG WITH OTHER PEOPLE: NOT DIFFICULT AT ALL
SUM OF ALL RESPONSES TO PHQ QUESTIONS 1-9: 3
SUM OF ALL RESPONSES TO PHQ QUESTIONS 1-9: 3

## 2025-04-09 ENCOUNTER — VIRTUAL VISIT (OUTPATIENT)
Dept: FAMILY MEDICINE | Facility: CLINIC | Age: 60
End: 2025-04-09
Payer: COMMERCIAL

## 2025-04-09 DIAGNOSIS — M79.7 FIBROMYALGIA: ICD-10-CM

## 2025-04-09 DIAGNOSIS — F33.42 RECURRENT MAJOR DEPRESSIVE DISORDER, IN FULL REMISSION: ICD-10-CM

## 2025-04-09 DIAGNOSIS — G93.32 CHRONIC FATIGUE SYNDROME: Primary | ICD-10-CM

## 2025-04-09 DIAGNOSIS — G89.4 CHRONIC PAIN SYNDROME: ICD-10-CM

## 2025-04-09 DIAGNOSIS — R03.0 ELEVATED BLOOD PRESSURE READING WITHOUT DIAGNOSIS OF HYPERTENSION: ICD-10-CM

## 2025-04-09 PROCEDURE — 96127 BRIEF EMOTIONAL/BEHAV ASSMT: CPT | Mod: 95 | Performed by: FAMILY MEDICINE

## 2025-04-09 PROCEDURE — 98005 SYNCH AUDIO-VIDEO EST LOW 20: CPT | Performed by: FAMILY MEDICINE

## 2025-04-09 RX ORDER — PREGABALIN 150 MG/1
150 CAPSULE ORAL 2 TIMES DAILY
Qty: 60 CAPSULE | Refills: 0 | Status: SHIPPED | OUTPATIENT
Start: 2025-04-09

## 2025-04-09 NOTE — PROGRESS NOTES
Margaret is a 60 year old who is being evaluated via a billable video visit.    How would you like to obtain your AVS? MyChart  If the video visit is dropped, the invitation should be resent by: Text to cell phone: [phone]  Will anyone else be joining your video visit? No      Assessment & Plan       ICD-10-CM    1. Chronic fatigue syndrome  G93.32 pregabalin (LYRICA) 150 MG capsule      2. Fibromyalgia  M79.7 pregabalin (LYRICA) 150 MG capsule      3. Chronic pain syndrome  G89.4 pregabalin (LYRICA) 150 MG capsule      4. Recurrent major depressive disorder, in full remission  F33.42 pregabalin (LYRICA) 150 MG capsule      5. Elevated blood pressure reading without diagnosis of hypertension  R03.0          I gave him an extra month of Lyrica for upcoming travel (with note of explanation to pharmacist).  That will actually help sync the Lyrica Rx with his 2 other prescriptions.  He will need them all renewed in July.    I'd like to see him in person when he return to the [place] in July to monitor his BP and he has already scheduled that.    I spent a total of 29 minutes on the day of the visit.   Time spent by me today doing chart review, history and exam, documentation and further activities per the note    The longitudinal plan of care for the diagnosis(es)/condition(s) as documented were addressed during this visit. Due to the added complexity in care, I will continue to support Margaret in the subsequent management and with ongoing continuity of care.    Danielle Orlando MD  [facility]   Margaret is a 60 year old, presenting for the following health issues:  Forms      4/9/2025    10:09 AM   Additional Questions   Roomed by Gisell Clarke         4/9/2025   Forms   Any forms needing to be completed Yes     Video Start Time: 10:41 AM    History of Present Illness       Reason for visit:  Fibromyalgia and medication pre-travel follow-up    He eats 4 or more servings of  fruits and vegetables daily.He consumes 0 sweetened beverage(s) daily.He exercises with enough effort to increase his heart rate 10 to 19 minutes per day.  He exercises with enough effort to increase his heart rate 3 or less days per week.   He is taking medications regularly.        Depression, Fibromyalgia and CFS Follow-Up   Margaret's mother became ill in December and passed away in January in [place].  He returned to [place] last month but is leaving to go back to [place] for another 2.5 months.  He needs additional Lyrica to cover until he gets back in July.  He had a flare of diffuse pain last month.  Doesn't need disability forms done this year; his employer states he has aged-out.    Social History     Tobacco Use    Smoking status: Never    Smokeless tobacco: Never   Vaping Use    Vaping status: Never Used   Substance Use Topics    Alcohol use: Yes     Comment: Very occasional use (once every few months)    Drug use: No         6/24/2024    11:42 AM 10/10/2024    11:35 AM 4/8/2025     1:45 PM   PHQ   PHQ-9 Total Score 3 2 3    Q9: Thoughts of better off dead/self-harm past 2 weeks Not at all Not at all Not at all       Patient-reported         2/24/2021    11:11 AM 7/27/2021     9:49 AM 8/15/2022    10:59 AM   GUERRERO-7 SCORE   Total Score 1 0 0       Elevated BP - it was high at [facility] and at urgent care visits.        Objective           Vitals:  No vitals were obtained today due to virtual visit.    Physical Exam   GENERAL: alert and no distress  EYES: Eyes grossly normal to inspection.  No discharge or erythema, or obvious scleral/conjunctival abnormalities.  RESP: No audible wheeze, cough, or visible cyanosis.    SKIN: Visible skin clear. No significant rash, abnormal pigmentation or lesions.  NEURO: Cranial nerves grossly intact.  Mentation and speech appropriate for age.  PSYCH: Appropriate affect, tone, and pace of words          Video-Visit Details    Type of service:  Video Visit   Video End Time:11:01  AM  Originating Location (pt. Location): Home  Distant Location (provider location):  Off-site  Platform used for Video Visit: Koki  Signed Electronically by: Danielle Orlando MD

## 2025-05-12 ENCOUNTER — MYC MEDICAL ADVICE (OUTPATIENT)
Dept: FAMILY MEDICINE | Facility: CLINIC | Age: 60
End: 2025-05-12
Payer: COMMERCIAL

## 2025-05-19 NOTE — TELEPHONE ENCOUNTER
Forms have been completed and are in the Care Team 1 Completed basket.    I made a copy for abstraction.  Danielle Orlando MD

## 2025-06-25 DIAGNOSIS — F33.42 RECURRENT MAJOR DEPRESSIVE DISORDER, IN FULL REMISSION: ICD-10-CM

## 2025-06-25 DIAGNOSIS — G93.32 CHRONIC FATIGUE SYNDROME: ICD-10-CM

## 2025-06-25 DIAGNOSIS — M79.7 FIBROMYALGIA: ICD-10-CM

## 2025-06-25 DIAGNOSIS — G89.4 CHRONIC PAIN SYNDROME: ICD-10-CM

## 2025-06-26 ENCOUNTER — MYC REFILL (OUTPATIENT)
Dept: FAMILY MEDICINE | Facility: CLINIC | Age: 60
End: 2025-06-26
Payer: COMMERCIAL

## 2025-06-26 DIAGNOSIS — G89.4 CHRONIC PAIN SYNDROME: ICD-10-CM

## 2025-06-26 DIAGNOSIS — G93.32 CHRONIC FATIGUE SYNDROME: ICD-10-CM

## 2025-06-26 DIAGNOSIS — F33.42 RECURRENT MAJOR DEPRESSIVE DISORDER, IN FULL REMISSION: ICD-10-CM

## 2025-06-26 DIAGNOSIS — M79.7 FIBROMYALGIA: ICD-10-CM

## 2025-06-30 RX ORDER — PREGABALIN 150 MG/1
150 CAPSULE ORAL 2 TIMES DAILY
Qty: 180 CAPSULE | Refills: 1 | Status: SHIPPED | OUTPATIENT
Start: 2025-06-30

## 2025-06-30 RX ORDER — PREGABALIN 150 MG/1
CAPSULE ORAL
Qty: 180 CAPSULE | Refills: 1 | OUTPATIENT
Start: 2025-06-30

## 2025-06-30 RX ORDER — MILNACIPRAN HYDROCHLORIDE 100 MG/1
100 TABLET, FILM COATED ORAL
Qty: 180 TABLET | Refills: 1 | Status: SHIPPED | OUTPATIENT
Start: 2025-06-30

## 2025-08-28 ENCOUNTER — OFFICE VISIT (OUTPATIENT)
Dept: FAMILY MEDICINE | Facility: CLINIC | Age: 60
End: 2025-08-28
Payer: COMMERCIAL

## 2025-08-28 VITALS
DIASTOLIC BLOOD PRESSURE: 89 MMHG | HEART RATE: 96 BPM | BODY MASS INDEX: 27.62 KG/M2 | WEIGHT: 197.3 LBS | SYSTOLIC BLOOD PRESSURE: 135 MMHG | TEMPERATURE: 97.3 F | RESPIRATION RATE: 16 BRPM | OXYGEN SATURATION: 98 % | HEIGHT: 71 IN

## 2025-08-28 DIAGNOSIS — Z23 NEED FOR VACCINATION: ICD-10-CM

## 2025-08-28 DIAGNOSIS — H91.91 HEARING LOSS OF RIGHT EAR, UNSPECIFIED HEARING LOSS TYPE: ICD-10-CM

## 2025-08-28 DIAGNOSIS — G93.32 CHRONIC FATIGUE SYNDROME: Primary | ICD-10-CM

## 2025-08-28 DIAGNOSIS — M79.7 FIBROMYALGIA: ICD-10-CM

## 2025-08-28 ASSESSMENT — PAIN SCALES - GENERAL: PAINLEVEL_OUTOF10: MILD PAIN (3)
